# Patient Record
Sex: FEMALE | Race: BLACK OR AFRICAN AMERICAN | NOT HISPANIC OR LATINO | Employment: OTHER | URBAN - METROPOLITAN AREA
[De-identification: names, ages, dates, MRNs, and addresses within clinical notes are randomized per-mention and may not be internally consistent; named-entity substitution may affect disease eponyms.]

---

## 2017-01-03 ENCOUNTER — HISTORICAL (OUTPATIENT)
Dept: ADMINISTRATIVE | Facility: HOSPITAL | Age: 77
End: 2017-01-03

## 2017-01-09 ENCOUNTER — HISTORICAL (OUTPATIENT)
Dept: ADMINISTRATIVE | Facility: HOSPITAL | Age: 77
End: 2017-01-09

## 2017-01-16 ENCOUNTER — HISTORICAL (OUTPATIENT)
Dept: ADMINISTRATIVE | Facility: HOSPITAL | Age: 77
End: 2017-01-16

## 2017-01-23 ENCOUNTER — HISTORICAL (OUTPATIENT)
Dept: ADMINISTRATIVE | Facility: HOSPITAL | Age: 77
End: 2017-01-23

## 2017-01-30 ENCOUNTER — HISTORICAL (OUTPATIENT)
Dept: ADMINISTRATIVE | Facility: HOSPITAL | Age: 77
End: 2017-01-30

## 2017-02-06 ENCOUNTER — HISTORICAL (OUTPATIENT)
Dept: ADMINISTRATIVE | Facility: HOSPITAL | Age: 77
End: 2017-02-06

## 2017-02-09 ENCOUNTER — HISTORICAL (OUTPATIENT)
Dept: RADIOLOGY | Facility: HOSPITAL | Age: 77
End: 2017-02-09

## 2017-02-13 ENCOUNTER — HISTORICAL (OUTPATIENT)
Dept: ADMINISTRATIVE | Facility: HOSPITAL | Age: 77
End: 2017-02-13

## 2017-02-20 ENCOUNTER — HISTORICAL (OUTPATIENT)
Dept: ADMINISTRATIVE | Facility: HOSPITAL | Age: 77
End: 2017-02-20

## 2017-03-02 ENCOUNTER — HISTORICAL (OUTPATIENT)
Dept: ADMINISTRATIVE | Facility: HOSPITAL | Age: 77
End: 2017-03-02

## 2017-03-09 ENCOUNTER — HISTORICAL (OUTPATIENT)
Dept: ADMINISTRATIVE | Facility: HOSPITAL | Age: 77
End: 2017-03-09

## 2017-03-16 ENCOUNTER — HISTORICAL (OUTPATIENT)
Dept: ADMINISTRATIVE | Facility: HOSPITAL | Age: 77
End: 2017-03-16

## 2017-03-23 ENCOUNTER — HISTORICAL (OUTPATIENT)
Dept: ADMINISTRATIVE | Facility: HOSPITAL | Age: 77
End: 2017-03-23

## 2017-03-30 ENCOUNTER — HISTORICAL (OUTPATIENT)
Dept: ADMINISTRATIVE | Facility: HOSPITAL | Age: 77
End: 2017-03-30

## 2017-04-06 ENCOUNTER — HISTORICAL (OUTPATIENT)
Dept: ADMINISTRATIVE | Facility: HOSPITAL | Age: 77
End: 2017-04-06

## 2017-04-20 ENCOUNTER — HISTORICAL (OUTPATIENT)
Dept: ADMINISTRATIVE | Facility: HOSPITAL | Age: 77
End: 2017-04-20

## 2017-05-11 ENCOUNTER — HISTORICAL (OUTPATIENT)
Dept: ADMINISTRATIVE | Facility: HOSPITAL | Age: 77
End: 2017-05-11

## 2017-06-08 ENCOUNTER — HISTORICAL (OUTPATIENT)
Dept: ADMINISTRATIVE | Facility: HOSPITAL | Age: 77
End: 2017-06-08

## 2017-06-20 ENCOUNTER — HISTORICAL (OUTPATIENT)
Dept: RADIOLOGY | Facility: HOSPITAL | Age: 77
End: 2017-06-20

## 2017-07-20 ENCOUNTER — HISTORICAL (OUTPATIENT)
Dept: ADMINISTRATIVE | Facility: HOSPITAL | Age: 77
End: 2017-07-20

## 2017-09-12 ENCOUNTER — HISTORICAL (OUTPATIENT)
Dept: ADMINISTRATIVE | Facility: HOSPITAL | Age: 77
End: 2017-09-12

## 2018-02-12 ENCOUNTER — HISTORICAL (OUTPATIENT)
Dept: RADIOLOGY | Facility: HOSPITAL | Age: 78
End: 2018-02-12

## 2018-08-20 ENCOUNTER — HISTORICAL (OUTPATIENT)
Dept: WOUND CARE | Facility: HOSPITAL | Age: 78
End: 2018-08-20

## 2018-08-22 LAB — FINAL CULTURE: NORMAL

## 2018-08-28 ENCOUNTER — HISTORICAL (OUTPATIENT)
Dept: WOUND CARE | Facility: HOSPITAL | Age: 78
End: 2018-08-28

## 2018-09-04 ENCOUNTER — HISTORICAL (OUTPATIENT)
Dept: WOUND CARE | Facility: HOSPITAL | Age: 78
End: 2018-09-04

## 2018-09-10 ENCOUNTER — HISTORICAL (OUTPATIENT)
Dept: WOUND CARE | Facility: HOSPITAL | Age: 78
End: 2018-09-10

## 2018-09-17 ENCOUNTER — HISTORICAL (OUTPATIENT)
Dept: WOUND CARE | Facility: HOSPITAL | Age: 78
End: 2018-09-17

## 2018-09-25 ENCOUNTER — HISTORICAL (OUTPATIENT)
Dept: WOUND CARE | Facility: HOSPITAL | Age: 78
End: 2018-09-25

## 2018-10-02 ENCOUNTER — HISTORICAL (OUTPATIENT)
Dept: WOUND CARE | Facility: HOSPITAL | Age: 78
End: 2018-10-02

## 2018-10-09 ENCOUNTER — HISTORICAL (OUTPATIENT)
Dept: WOUND CARE | Facility: HOSPITAL | Age: 78
End: 2018-10-09

## 2018-10-16 ENCOUNTER — HISTORICAL (OUTPATIENT)
Dept: WOUND CARE | Facility: HOSPITAL | Age: 78
End: 2018-10-16

## 2018-10-23 ENCOUNTER — HISTORICAL (OUTPATIENT)
Dept: WOUND CARE | Facility: HOSPITAL | Age: 78
End: 2018-10-23

## 2018-10-30 ENCOUNTER — HISTORICAL (OUTPATIENT)
Dept: WOUND CARE | Facility: HOSPITAL | Age: 78
End: 2018-10-30

## 2018-11-06 ENCOUNTER — HISTORICAL (OUTPATIENT)
Dept: WOUND CARE | Facility: HOSPITAL | Age: 78
End: 2018-11-06

## 2018-11-13 ENCOUNTER — HISTORICAL (OUTPATIENT)
Dept: WOUND CARE | Facility: HOSPITAL | Age: 78
End: 2018-11-13

## 2018-11-16 LAB
FINAL CULTURE: NORMAL
FINAL CULTURE: NORMAL

## 2018-11-20 ENCOUNTER — HISTORICAL (OUTPATIENT)
Dept: WOUND CARE | Facility: HOSPITAL | Age: 78
End: 2018-11-20

## 2018-11-27 ENCOUNTER — HISTORICAL (OUTPATIENT)
Dept: WOUND CARE | Facility: HOSPITAL | Age: 78
End: 2018-11-27

## 2018-12-04 ENCOUNTER — HISTORICAL (OUTPATIENT)
Dept: WOUND CARE | Facility: HOSPITAL | Age: 78
End: 2018-12-04

## 2018-12-10 ENCOUNTER — HISTORICAL (OUTPATIENT)
Dept: WOUND CARE | Facility: HOSPITAL | Age: 78
End: 2018-12-10

## 2018-12-17 ENCOUNTER — HISTORICAL (OUTPATIENT)
Dept: WOUND CARE | Facility: HOSPITAL | Age: 78
End: 2018-12-17

## 2018-12-24 ENCOUNTER — HISTORICAL (OUTPATIENT)
Dept: WOUND CARE | Facility: HOSPITAL | Age: 78
End: 2018-12-24

## 2018-12-31 ENCOUNTER — HISTORICAL (OUTPATIENT)
Dept: WOUND CARE | Facility: HOSPITAL | Age: 78
End: 2018-12-31

## 2019-01-07 ENCOUNTER — HISTORICAL (OUTPATIENT)
Dept: WOUND CARE | Facility: HOSPITAL | Age: 79
End: 2019-01-07

## 2019-01-14 ENCOUNTER — HISTORICAL (OUTPATIENT)
Dept: WOUND CARE | Facility: HOSPITAL | Age: 79
End: 2019-01-14

## 2019-01-21 ENCOUNTER — HISTORICAL (OUTPATIENT)
Dept: WOUND CARE | Facility: HOSPITAL | Age: 79
End: 2019-01-21

## 2019-01-28 ENCOUNTER — HISTORICAL (OUTPATIENT)
Dept: WOUND CARE | Facility: HOSPITAL | Age: 79
End: 2019-01-28

## 2019-02-04 ENCOUNTER — HISTORICAL (OUTPATIENT)
Dept: WOUND CARE | Facility: HOSPITAL | Age: 79
End: 2019-02-04

## 2019-02-11 ENCOUNTER — HISTORICAL (OUTPATIENT)
Dept: WOUND CARE | Facility: HOSPITAL | Age: 79
End: 2019-02-11

## 2019-02-15 ENCOUNTER — HISTORICAL (OUTPATIENT)
Dept: RADIOLOGY | Facility: HOSPITAL | Age: 79
End: 2019-02-15

## 2019-02-18 ENCOUNTER — HISTORICAL (OUTPATIENT)
Dept: WOUND CARE | Facility: HOSPITAL | Age: 79
End: 2019-02-18

## 2019-02-25 ENCOUNTER — HISTORICAL (OUTPATIENT)
Dept: WOUND CARE | Facility: HOSPITAL | Age: 79
End: 2019-02-25

## 2019-03-04 ENCOUNTER — HISTORICAL (OUTPATIENT)
Dept: WOUND CARE | Facility: HOSPITAL | Age: 79
End: 2019-03-04

## 2019-03-11 ENCOUNTER — HISTORICAL (OUTPATIENT)
Dept: WOUND CARE | Facility: HOSPITAL | Age: 79
End: 2019-03-11

## 2019-03-18 ENCOUNTER — HISTORICAL (OUTPATIENT)
Dept: WOUND CARE | Facility: HOSPITAL | Age: 79
End: 2019-03-18

## 2019-03-25 ENCOUNTER — HISTORICAL (OUTPATIENT)
Dept: WOUND CARE | Facility: HOSPITAL | Age: 79
End: 2019-03-25

## 2019-04-01 ENCOUNTER — HISTORICAL (OUTPATIENT)
Dept: WOUND CARE | Facility: HOSPITAL | Age: 79
End: 2019-04-01

## 2019-04-08 ENCOUNTER — HISTORICAL (OUTPATIENT)
Dept: WOUND CARE | Facility: HOSPITAL | Age: 79
End: 2019-04-08

## 2019-04-15 ENCOUNTER — HISTORICAL (OUTPATIENT)
Dept: WOUND CARE | Facility: HOSPITAL | Age: 79
End: 2019-04-15

## 2019-04-22 ENCOUNTER — HISTORICAL (OUTPATIENT)
Dept: WOUND CARE | Facility: HOSPITAL | Age: 79
End: 2019-04-22

## 2019-04-29 ENCOUNTER — HISTORICAL (OUTPATIENT)
Dept: WOUND CARE | Facility: HOSPITAL | Age: 79
End: 2019-04-29

## 2019-05-06 ENCOUNTER — HISTORICAL (OUTPATIENT)
Dept: WOUND CARE | Facility: HOSPITAL | Age: 79
End: 2019-05-06

## 2019-05-13 ENCOUNTER — HISTORICAL (OUTPATIENT)
Dept: WOUND CARE | Facility: HOSPITAL | Age: 79
End: 2019-05-13

## 2019-05-20 ENCOUNTER — HISTORICAL (OUTPATIENT)
Dept: WOUND CARE | Facility: HOSPITAL | Age: 79
End: 2019-05-20

## 2019-05-28 ENCOUNTER — HISTORICAL (OUTPATIENT)
Dept: WOUND CARE | Facility: HOSPITAL | Age: 79
End: 2019-05-28

## 2019-06-03 ENCOUNTER — HISTORICAL (OUTPATIENT)
Dept: WOUND CARE | Facility: HOSPITAL | Age: 79
End: 2019-06-03

## 2019-06-10 ENCOUNTER — HISTORICAL (OUTPATIENT)
Dept: WOUND CARE | Facility: HOSPITAL | Age: 79
End: 2019-06-10

## 2019-06-17 ENCOUNTER — HISTORICAL (OUTPATIENT)
Dept: WOUND CARE | Facility: HOSPITAL | Age: 79
End: 2019-06-17

## 2019-06-26 ENCOUNTER — HISTORICAL (OUTPATIENT)
Dept: WOUND CARE | Facility: HOSPITAL | Age: 79
End: 2019-06-26

## 2019-06-27 LAB — GRAM STN SPEC: NORMAL

## 2019-06-29 LAB — FINAL CULTURE: NORMAL

## 2019-07-01 ENCOUNTER — HISTORICAL (OUTPATIENT)
Dept: WOUND CARE | Facility: HOSPITAL | Age: 79
End: 2019-07-01

## 2019-07-08 ENCOUNTER — HISTORICAL (OUTPATIENT)
Dept: WOUND CARE | Facility: HOSPITAL | Age: 79
End: 2019-07-08

## 2019-07-15 ENCOUNTER — HISTORICAL (OUTPATIENT)
Dept: WOUND CARE | Facility: HOSPITAL | Age: 79
End: 2019-07-15

## 2019-07-22 ENCOUNTER — HISTORICAL (OUTPATIENT)
Dept: WOUND CARE | Facility: HOSPITAL | Age: 79
End: 2019-07-22

## 2019-07-31 ENCOUNTER — HISTORICAL (OUTPATIENT)
Dept: WOUND CARE | Facility: HOSPITAL | Age: 79
End: 2019-07-31

## 2019-12-19 LAB — GRAM STN SPEC: NORMAL

## 2019-12-22 LAB — FINAL CULTURE: NORMAL

## 2020-07-01 LAB — GRAM STN SPEC: NORMAL

## 2020-07-16 LAB — GRAM STN SPEC: NORMAL

## 2021-04-08 LAB — GRAM STN SPEC: NORMAL

## 2021-04-10 LAB — FINAL CULTURE: NORMAL

## 2021-07-16 LAB — GRAM STN SPEC: NORMAL

## 2021-07-18 LAB — FINAL CULTURE: NORMAL

## 2021-09-22 ENCOUNTER — HISTORICAL (OUTPATIENT)
Dept: WOUND CARE | Facility: HOSPITAL | Age: 81
End: 2021-09-22

## 2022-04-11 ENCOUNTER — HISTORICAL (OUTPATIENT)
Dept: ADMINISTRATIVE | Facility: HOSPITAL | Age: 82
End: 2022-04-11

## 2022-04-25 VITALS
OXYGEN SATURATION: 97 % | BODY MASS INDEX: 38.49 KG/M2 | WEIGHT: 274.94 LBS | SYSTOLIC BLOOD PRESSURE: 229 MMHG | DIASTOLIC BLOOD PRESSURE: 80 MMHG | HEIGHT: 71 IN

## 2022-05-11 ENCOUNTER — OFFICE VISIT (OUTPATIENT)
Dept: WOUND CARE | Facility: HOSPITAL | Age: 82
End: 2022-05-11
Attending: NURSE PRACTITIONER
Payer: MEDICARE

## 2022-05-11 VITALS
RESPIRATION RATE: 24 BRPM | BODY MASS INDEX: 39.68 KG/M2 | HEART RATE: 63 BPM | SYSTOLIC BLOOD PRESSURE: 129 MMHG | TEMPERATURE: 98 F | DIASTOLIC BLOOD PRESSURE: 60 MMHG | WEIGHT: 293 LBS | HEIGHT: 72 IN

## 2022-05-11 DIAGNOSIS — I87.312 IDIOPATHIC CHRONIC VENOUS HYPERTENSION OF LEFT LOWER EXTREMITY WITH ULCER: ICD-10-CM

## 2022-05-11 DIAGNOSIS — L97.922 NON-PRESSURE CHRONIC ULCER OF LEFT LOWER LEG WITH FAT LAYER EXPOSED: Primary | ICD-10-CM

## 2022-05-11 DIAGNOSIS — L97.929 IDIOPATHIC CHRONIC VENOUS HYPERTENSION OF LEFT LOWER EXTREMITY WITH ULCER: ICD-10-CM

## 2022-05-11 DIAGNOSIS — L97.912 NON-PRESSURE CHRONIC ULCER OF RIGHT LOWER LEG WITH FAT LAYER EXPOSED: ICD-10-CM

## 2022-05-11 DIAGNOSIS — L97.919 IDIOPATHIC CHRONIC VENOUS HYPERTENSION OF RIGHT LOWER EXTREMITY WITH ULCER: ICD-10-CM

## 2022-05-11 DIAGNOSIS — I87.311 IDIOPATHIC CHRONIC VENOUS HYPERTENSION OF RIGHT LOWER EXTREMITY WITH ULCER: ICD-10-CM

## 2022-05-11 PROBLEM — H26.9 CATARACT OF BOTH EYES: Status: ACTIVE | Noted: 2022-05-11

## 2022-05-11 PROBLEM — F32.A ACUTE DEPRESSION: Status: ACTIVE | Noted: 2022-05-11

## 2022-05-11 PROBLEM — J18.9 PNEUMONIA: Status: ACTIVE | Noted: 2022-05-11

## 2022-05-11 PROBLEM — M19.90 OSTEOARTHRITIS: Status: ACTIVE | Noted: 2022-05-11

## 2022-05-11 PROBLEM — L98.499 SKIN ULCER: Status: ACTIVE | Noted: 2022-05-11

## 2022-05-11 PROBLEM — R06.09 DYSPNEA ON EXERTION: Status: ACTIVE | Noted: 2022-05-11

## 2022-05-11 PROBLEM — L97.909 ULCER OF LOWER EXTREMITY: Status: ACTIVE | Noted: 2022-05-11

## 2022-05-11 PROBLEM — E66.9 OBESITY: Status: ACTIVE | Noted: 2022-05-11

## 2022-05-11 PROBLEM — K29.60 BILE-INDUCED GASTRITIS: Status: ACTIVE | Noted: 2022-05-11

## 2022-05-11 PROBLEM — I27.0: Status: ACTIVE | Noted: 2022-05-11

## 2022-05-11 PROBLEM — I87.309 CHRONIC PERIPHERAL VENOUS HYPERTENSION: Status: ACTIVE | Noted: 2022-05-11

## 2022-05-11 PROBLEM — R60.9 PITTING EDEMA: Status: ACTIVE | Noted: 2022-05-11

## 2022-05-11 PROBLEM — R60.0 EDEMA OF LOWER EXTREMITY: Status: ACTIVE | Noted: 2022-05-11

## 2022-05-11 PROBLEM — I83.009 VENOUS STASIS ULCER OF LOWER EXTREMITY: Status: ACTIVE | Noted: 2022-05-11

## 2022-05-11 PROBLEM — I10 HYPERTENSION: Status: ACTIVE | Noted: 2022-05-11

## 2022-05-11 PROBLEM — B95.8 STAPHYLOCOCCUS INFECTION: Status: ACTIVE | Noted: 2022-05-11

## 2022-05-11 PROBLEM — I87.9 DISORDER OF VEIN: Status: ACTIVE | Noted: 2022-05-11

## 2022-05-11 PROBLEM — Z97.3 WEARS EYEGLASSES: Status: ACTIVE | Noted: 2022-05-11

## 2022-05-11 PROBLEM — K44.9 HIATAL HERNIA: Status: ACTIVE | Noted: 2022-05-11

## 2022-05-11 PROBLEM — K21.9 GASTROESOPHAGEAL REFLUX DISEASE: Status: ACTIVE | Noted: 2022-05-11

## 2022-05-11 PROBLEM — L97.909 VENOUS STASIS ULCER OF LOWER EXTREMITY: Status: ACTIVE | Noted: 2022-05-11

## 2022-05-11 PROCEDURE — 97597 DBRDMT OPN WND 1ST 20 CM/<: CPT

## 2022-05-11 PROCEDURE — 29580 STRAPPING UNNA BOOT: CPT

## 2022-05-11 RX ORDER — CARVEDILOL 12.5 MG/1
12.5 TABLET ORAL 2 TIMES DAILY
COMMUNITY
Start: 2022-04-03

## 2022-05-11 RX ORDER — SPIRONOLACTONE AND HYDROCHLOROTHIAZIDE 25; 25 MG/1; MG/1
TABLET ORAL
COMMUNITY
Start: 2022-05-10 | End: 2022-09-14 | Stop reason: ALTCHOICE

## 2022-05-11 RX ORDER — FLUOXETINE HYDROCHLORIDE 20 MG/1
20 CAPSULE ORAL DAILY
COMMUNITY
Start: 2022-04-02

## 2022-05-11 RX ORDER — AMLODIPINE BESYLATE 2.5 MG/1
2.5 TABLET ORAL DAILY
COMMUNITY
Start: 2022-01-31 | End: 2022-09-14 | Stop reason: ALTCHOICE

## 2022-05-11 RX ORDER — FUROSEMIDE 40 MG/1
TABLET ORAL
COMMUNITY
Start: 2022-04-23 | End: 2022-09-14 | Stop reason: ALTCHOICE

## 2022-05-11 RX ORDER — CLOPIDOGREL BISULFATE 75 MG/1
75 TABLET ORAL DAILY
COMMUNITY
Start: 2022-04-26

## 2022-05-11 RX ORDER — VALSARTAN 160 MG/1
160 TABLET ORAL 2 TIMES DAILY
COMMUNITY
Start: 2022-04-23

## 2022-05-11 RX ORDER — PANTOPRAZOLE SODIUM 40 MG/1
40 TABLET, DELAYED RELEASE ORAL DAILY
COMMUNITY
Start: 2022-04-26

## 2022-05-11 RX ORDER — COLLAGENASE SANTYL 250 [ARB'U]/G
1 OINTMENT TOPICAL DAILY
COMMUNITY
Start: 2021-12-10

## 2022-05-11 NOTE — PROGRESS NOTES
Had xray of knees.  Dr. Sanders planning on doing injections for pain relief on 5/24/22    Left lateral leg-non excisional  [place]  lidocaine 5%        Home health to change on  Friday, Monday, Wednesday, Friday, and Monday  Left & right leg wounds-Cleanse with wound cleanser, apply Zinc paste periwound, apply calcium alginate to wound bed, apply Zinc unna boot, wrap with kerlix and coban.     Return to clinic in 2 weeks.   Spine appears normal, range of motion is not limited, no muscle or joint tenderness + LE swelling R>L

## 2022-05-11 NOTE — PROGRESS NOTES
[facility] WOUND CARE  WOUND CARE PROGRESS NOTE      Patient name: Jil Graves MRN: [id number]   : 1940 PCP: Antwon Melendez MD     SUBJECTIVE    Chief Complaint:   Chief Complaint   Patient presents with    Venous Ulcer     Bilateral venous stasis wounds       HPI:  Ms. Graves is being seen for bilateral lower extremity venous stasis ulcers.    She reports that she has seen her orthopedic surgeon who did x-rays of her bilateral knees.  Doctor Tommy will be doing injections for pain relief on May 24, 2022. He is also considering braces for her knees.    Today her wounds are stable.  The right lower leg wound is measuring slightly smaller.  The left lower leg wound has an increase in granular tissue as well as an increase in blood perfusion.      Review of Systems:  Constitutional: Negative.    Cardiovascular: Negative.    Skin:        As HPI.   All other systems reviewed and are negative.        Physical Exam  Vitals and nursing note reviewed.   Constitutional:       Appearance: morbidly obese  Cardiovascular:      2+ edema to the lower extremities  Pulmonary:      SOB with exertion   Skin:     General: Skin is warm and dry.      Comments: venous ulcer to the left and right lower extremities    Neurological:      Mental Status: female is alert.       OBJECTIVE    Medical History:  Patient Active Problem List   Diagnosis    Acute depression    Bile-induced gastritis    Cataract of both eyes    Idiopathic chronic venous hypertension of left lower extremity with ulcer    Disorder of vein    Dyspnea on exertion    Edema of lower extremity    Gastroesophageal reflux disease    Hiatal hernia    Hypertension    Obesity    Osteoarthritis    Pitting edema    Pneumonia    Skin ulcer    Staphylococcus infection    Non-pressure chronic ulcer of right lower leg with fat layer exposed    Venous stasis ulcer of lower extremity    Wears eyeglasses    Pulmonary veno-occlusive disease        Wound  05/11/22 1157 Venous Ulcer lower;lateral Leg #1 (Active)   Wound Length (cm) 8.2 cm 05/11/22 1156   Wound Width (cm) 7.5 cm 05/11/22 1156   Wound Depth (cm) 0.6 cm 05/11/22 1156   Wound Volume (cm^3) 36.9 cm^3 05/11/22 1156            Wound 05/11/22 1157 Venous Ulcer Right lower;medial #2 (Active)   Wound Length (cm) 1.4 cm 05/11/22 1156   Wound Width (cm) 1.6 cm 05/11/22 1156   Wound Depth (cm) 0.2 cm 05/11/22 1156   Wound Volume (cm^3) 0.448 cm^3 05/11/22 1156         PROCEDURES    5/11/2022  1:57 PM    Indication for procedure: Removal of specific targeted areas of devitalized or necrotic tissue or debris from wound bed.  Wound location:   #1 - left lateral lower leg  #2 - right medial lower leg  Equipment utilized: [place]  Wound Condition:   #1 - improved  #2 - improved  Procedure: Selective debridement, both wounds  Anesthesia: 5% topical lidocaine  Estimated blood loss: N/A  Complications: none  Patient tolerated procedure: well  Wound dressing applied:   Bilateral lower extremity wounds:  Zinc unna boots, kerlix, coban  Description of the procedure:  Informed consent was obtained pre-procedurally, with the risks, benefits and alteratives to the planned procedure explained at length to the patient, who expressed understanding and wished to proceed. A time out was observed and on the day of procedure the patient was correctly identified.    The wound was prepared initially by irrigating with wound cleanser.     A selective debridement of wound #1 was then carried out removing the following tissue types:  Slough, biofilm, exudate   A selective debridement of wound # was then carried out removing the following tissue types: biofilm, exudate      Total surface area debrided was <20 cm^2, both wounds    Percent debrided 100 %.       IMPRESSION     1. 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 Idiopathic chronic venous hypertension of right lower extremity with ulcer    3. Non-pressure  chronic ulcer of right lower leg with fat layer exposed    4. Idiopathic chronic venous hypertension of left lower extremity with ulcer           PLAN  Follow up in about 2 weeks (around 5/25/2022).     #2 - Wound dressing:  Left & right leg wounds-Cleanse with wound cleanser, apply Zinc paste periwound, apply calcium alginate to wound bed, apply Zinc unna boot, wrap with kerlix and coban.  Home health to change on  Friday, Monday, Wednesday, Friday, and Monday  [facility] Fax [phone]    MEDICATIONS    Current Outpatient Medications:     amLODIPine (NORVASC) 2.5 MG tablet, Take 2.5 mg by mouth once daily., Disp: , Rfl:     carvediloL (COREG) 12.5 MG tablet, Take 12.5 mg by mouth 2 (two) times daily., Disp: , Rfl:     clopidogreL (PLAVIX) 75 mg tablet, Take 75 mg by mouth once daily., Disp: , Rfl:     FLUoxetine 20 MG capsule, Take 20 mg by mouth once daily., Disp: , Rfl:     furosemide (LASIX) 40 MG tablet, TAKE 1 TABLET BY MOUTH TWICE DAILY IN THE MORNING AND AT LUNCH, Disp: , Rfl:     pantoprazole (PROTONIX) 40 MG tablet, Take 40 mg by mouth once daily at 6am., Disp: , Rfl:     SANTYL ointment, Apply 1 application topically once daily at 6am., Disp: , Rfl:     spironolactone-hydrochlorothiazide 25-25mg (ALDACTAZIDE) 25-25 mg Tab, , Disp: , Rfl:     valsartan (DIOVAN) 160 MG tablet, Take 160 mg by mouth 2 (two) times daily., Disp: , Rfl:  Review of patient's allergies indicates:  No Known Allergies    VITAL SIGNS  Vitals:    05/11/22 1126   BP: 129/60   Pulse: 63   Resp: (!) 24   Temp: 98.1 °F (36.7 °C)         Electronically signed:  Bonnie Portillo NP, MSN

## 2022-05-25 ENCOUNTER — HOSPITAL ENCOUNTER (OUTPATIENT)
Dept: WOUND CARE | Facility: HOSPITAL | Age: 82
Discharge: HOME | End: 2022-05-25
Attending: NURSE PRACTITIONER
Payer: MEDICARE

## 2022-05-25 VITALS
SYSTOLIC BLOOD PRESSURE: 122 MMHG | HEIGHT: 72 IN | HEART RATE: 60 BPM | WEIGHT: 293 LBS | RESPIRATION RATE: 20 BRPM | DIASTOLIC BLOOD PRESSURE: 58 MMHG | BODY MASS INDEX: 39.68 KG/M2

## 2022-05-25 DIAGNOSIS — L97.912 NON-PRESSURE CHRONIC ULCER OF RIGHT LOWER LEG WITH FAT LAYER EXPOSED: ICD-10-CM

## 2022-05-25 DIAGNOSIS — L97.922 NON-PRESSURE CHRONIC ULCER OF LEFT LOWER LEG WITH FAT LAYER EXPOSED: Primary | ICD-10-CM

## 2022-05-25 PROCEDURE — 99214 OFFICE O/P EST MOD 30 MIN: CPT | Mod: 25

## 2022-05-25 PROCEDURE — 29580 STRAPPING UNNA BOOT: CPT

## 2022-05-25 PROCEDURE — 97598 DBRDMT OPN WND ADDL 20CM/<: CPT

## 2022-05-25 PROCEDURE — 97597 DBRDMT OPN WND 1ST 20 CM/<: CPT

## 2022-05-25 NOTE — PROGRESS NOTES
Subjective:       Patient ID: Jil Graves is a 82 y.o. female.    Chief Complaint: Bilateral venous ulcers    HPI     Star is being seen for bilateral lower extremity venous ulcers.  She reports that she saw her orthopedic surgeon recently and had bilateral knee injections with removal of fluid.  She reports that she is much more comfortable although she does still have edema in both lower extremities.  The venous ulcer on the right lower extremity looks particularly well and has continued to decrease in size.  The venous ulcer on the left lower extremity is doing very well with the wound bed that is quickly developing granulation tissue.    Both wound beds are clean with decreasing drainage.  Ms. Graves is out of lidocaine.  We will see if she is able to tolerate debridement without that product going forward.     Review of Systems   Constitutional: Negative.    HENT: Negative.    Respiratory: Negative.    Musculoskeletal: Positive for gait problem.   Skin: Positive for wound.   Psychiatric/Behavioral: Negative.          Objective:        Physical Exam  Vitals reviewed.   Constitutional:       Appearance: She is obese.   Cardiovascular:      Pulses: Normal pulses.   Pulmonary:      Effort: Pulmonary effort is normal.      Breath sounds: Normal breath sounds.   Musculoskeletal:      Right lower leg: Edema present.      Left lower leg: Edema present.   Skin:     General: Skin is warm and dry.   Neurological:      General: No focal deficit present.      Mental Status: She is alert and oriented to person, place, and time.   Psychiatric:         Mood and Affect: Mood normal.         Behavior: Behavior normal.         Thought Content: Thought content normal.         Judgment: Judgment normal.            Wound 05/11/22 1157 Venous Ulcer Left lower;lateral Leg #1 (Active)   05/11/22 1157    Pre-existing:    Primary Wound Type: Venous ulcer   Side: Left   Orientation: lower;lateral   Location: Leg   Wound Number: #1    Ankle-Brachial Index:    Pulses:    Removal Indication and Assessment:    Wound Outcome:    (Retired) Wound Type:    (Retired) Wound Length (cm):    (Retired) Wound Width (cm):    (Retired) Depth (cm):    Wound Description (Comments):    Removal Indications:    Dressing Appearance Moist drainage 05/25/22 1102   Drainage Amount Large 05/25/22 1102   Drainage Characteristics/Odor Serosanguineous;Green 05/25/22 1102   Appearance Pink;Slough;Moist 05/25/22 1102   Tissue loss description Full thickness 05/25/22 1102   Periwound Area Intact;Moist 05/25/22 1102   Wound Edges Open 05/25/22 1102   Wound Length (cm) 7.6 cm 05/25/22 1102   Wound Width (cm) 7.7 cm 05/25/22 1102   Wound Depth (cm) 0.7 cm 05/25/22 1102   Wound Volume (cm^3) 40.964 cm^3 05/25/22 1102   Wound Surface Area (cm^2) 58.52 cm^2 05/25/22 1102   Care Cleansed with:;Wound cleanser 05/25/22 1102   Dressing Applied 05/25/22 1102   Compression Unna's Boot;Two layer compression 05/25/22 1102   Dressing Change Due 05/27/22 05/25/22 1102            Wound 05/11/22 1157 Venous Ulcer Right lower;medial #2 (Active)   05/11/22 1157    Pre-existing:    Primary Wound Type: Venous ulcer   Side: Right   Orientation: lower;medial   Location:    Wound Number: #2   Ankle-Brachial Index:    Pulses:    Removal Indication and Assessment:    Wound Outcome:    (Retired) Wound Type:    (Retired) Wound Length (cm):    (Retired) Wound Width (cm):    (Retired) Depth (cm):    Wound Description (Comments):    Removal Indications:    Dressing Appearance Moist drainage 05/25/22 1102   Drainage Amount Moderate 05/25/22 1102   Drainage Characteristics/Odor Serosanguineous 05/25/22 1102   Appearance Pink;Moist 05/25/22 1102   Tissue loss description Full thickness 05/25/22 1102   Periwound Area Moist;Intact 05/25/22 1102   Wound Edges Open 05/25/22 1102   Wound Length (cm) 1 cm 05/25/22 1102   Wound Width (cm) 1.1 cm 05/25/22 1102   Wound Depth (cm) 0.2 cm 05/25/22 1102   Wound Volume  "(cm^3) 0.22 cm^3 05/25/22 1102   Wound Surface Area (cm^2) 1.1 cm^2 05/25/22 1102   Care Cleansed with:;Wound cleanser 05/25/22 1102   Dressing Applied 05/25/22 1102   Compression Unna's Boot;Two layer compression 05/25/22 1102   Dressing Change Due 05/27/22 05/25/22 1102         Assessment:         ICD-10-CM ICD-9-CM   1. Non-pressure chronic ulcer of left lower leg with fat layer exposed  L97.922 707.10   2. Non-pressure chronic ulcer of right lower leg with fat layer exposed  L97.912 707.10         Procedures:   Excisional debridement performed:  [] Yes [x] No   Selective debridement performed:  [x] Yes [] No   Mechanical debridement performed:  [] Yes [x] No   Silver nitrate applied:    [] Yes [x] No   Labs ordered this visit:   [] Yes [x] No   Imaging ordered this visit:   [] Yes [x] No   Tissue pathology and/or culture taken:  [] Yes [x] No     Procedures:    Debridement     Date/Time: 5/25/2022 10:00 AM  Performed by: Bonnie Portillo NP  Authorized by: Bonnie Portillo NP     Time out: Immediately prior to procedure a "time out" was called to verify the correct patient, procedure, equipment, support staff and site/side marked as required.   Consent Done?:  Yes (Verbal)  Local anesthesia used?: Yes    Local anesthetic:  Lidocaine 2% without epinephrine     Wound Details:    Location:  Left leg    Type of Debridement:  Non-excisional       Length (cm):  7.6       Area (sq cm):  58.52       Width (cm):  7.7       Percent Debrided (%):  80       Depth (cm):  0.7       Total Area Debrided (sq cm):  46.82    Depth of debridement:  Epidermis/Dermis    Devitalized tissue debrided:  Biofilm, Exudate, Fibrin and Slough    Instruments:  Other ([place])     2nd Wound Details:     Location:  Right leg    Type of Debridement:  Non-excisional       Length (cm):  1       Area (sq cm):  1.1       Width (cm):  1.1       Percent Debrided (%):  100       Depth (cm):  0.2       Total Area Debrided (sq cm):  1.1    Depth of " debridement:  Epidermis/Dermis    Devitalized tissue debrided:  Biofilm, Exudate and Fibrin    Instruments:  Other ([place])      ML     [facility]: [facility]  TIMES PER WEEK/DAYS:  2 x week;  Monday, Friday  ORDERS:  Left & right leg wounds-Cleanse with wound cleanser, apply Zinc paste periwound, apply calcium alginate to wound bed, apply Zinc unna boot, wrap with kerlix and coban.  Home health to change on Monday, Wednesday and Friday until patient returns to wound care in two weeks.        Follow up in about 2 weeks (around 6/8/2022) for Bilateral lower extremity wounds - venous ulcer.

## 2022-06-08 ENCOUNTER — HOSPITAL ENCOUNTER (OUTPATIENT)
Dept: WOUND CARE | Facility: HOSPITAL | Age: 82
Discharge: HOME | End: 2022-06-08
Attending: NURSE PRACTITIONER
Payer: MEDICARE

## 2022-06-08 VITALS
RESPIRATION RATE: 20 BRPM | HEART RATE: 65 BPM | HEIGHT: 72 IN | SYSTOLIC BLOOD PRESSURE: 126 MMHG | BODY MASS INDEX: 39.68 KG/M2 | WEIGHT: 293 LBS | TEMPERATURE: 98 F | DIASTOLIC BLOOD PRESSURE: 58 MMHG

## 2022-06-08 DIAGNOSIS — L97.922 NON-PRESSURE CHRONIC ULCER OF LEFT LOWER LEG WITH FAT LAYER EXPOSED: Primary | ICD-10-CM

## 2022-06-08 DIAGNOSIS — L60.2 ONYCHAUXIS: ICD-10-CM

## 2022-06-08 DIAGNOSIS — I87.311 IDIOPATHIC CHRONIC VENOUS HYPERTENSION OF RIGHT LOWER EXTREMITY WITH ULCER: ICD-10-CM

## 2022-06-08 DIAGNOSIS — I87.312 IDIOPATHIC CHRONIC VENOUS HYPERTENSION OF LEFT LOWER EXTREMITY WITH ULCER: ICD-10-CM

## 2022-06-08 DIAGNOSIS — L97.919 IDIOPATHIC CHRONIC VENOUS HYPERTENSION OF RIGHT LOWER EXTREMITY WITH ULCER: ICD-10-CM

## 2022-06-08 DIAGNOSIS — L97.912 NON-PRESSURE CHRONIC ULCER OF RIGHT LOWER LEG WITH FAT LAYER EXPOSED: ICD-10-CM

## 2022-06-08 DIAGNOSIS — L97.929 IDIOPATHIC CHRONIC VENOUS HYPERTENSION OF LEFT LOWER EXTREMITY WITH ULCER: ICD-10-CM

## 2022-06-08 PROCEDURE — 97597 DBRDMT OPN WND 1ST 20 CM/<: CPT

## 2022-06-08 PROCEDURE — 99213 OFFICE O/P EST LOW 20 MIN: CPT

## 2022-06-08 PROCEDURE — 29580 STRAPPING UNNA BOOT: CPT

## 2022-06-08 PROCEDURE — 11055 PARING/CUTG B9 HYPRKER LES 1: CPT

## 2022-06-08 PROCEDURE — 11721 DEBRIDE NAIL 6 OR MORE: CPT

## 2022-06-08 PROCEDURE — 97598 DBRDMT OPN WND ADDL 20CM/<: CPT

## 2022-06-08 NOTE — PROGRESS NOTES
Subjective:       Patient ID: Jil Graves is a 82 y.o. female.    Chief Complaint: Venous Stasis (Bilateral lower legs)    HPI    Ms. Graves is returning for venous ulcers to her bilateral lower extremities.  These wounds have continued to make excellent progress over the last few weeks since we have been able to apply unna boots.  Although she could benefit from the use of Santyl, the progress with unna boots is, at this point, more important.  Both wounds have continued to decrease in size, and the wound on the right lower extremity is nearly closed.  Selective debridements were performed on both.    Of note, there was an area of concern on the distal end of the left 2nd toe.  A callus pairing was performed using a dermal curette, exposing epithelial tissue and a 0.1 x 0.1 x 0.1 cm wound.  We will not consider this area to be an open wound at this time.  The patient and I discussed the need to offload pressure from the toe to allow the area an opportunity to heal.      Because the patient is diabetic, her toe nails were trimmed x 10    Review of Systems   Musculoskeletal: Positive for gait problem.   Skin: Positive for wound.   All other systems reviewed and are negative.        Objective:      Vitals:    06/08/22 1125   BP: (!) 126/58   Pulse: 65   Resp: 20   Temp: 98.1 °F (36.7 °C)       Physical Exam  Vitals reviewed. Exam conducted with a chaperone present (Daughter).   Constitutional:       Appearance: She is obese.   HENT:      Head: Normocephalic.   Cardiovascular:      Rate and Rhythm: Normal rate.      Pulses: Normal pulses.   Pulmonary:      Effort: Pulmonary effort is normal.      Comments: SOB with exertion  Musculoskeletal:      Right lower leg: Edema present.      Left lower leg: Edema present.      Comments: Edema is improving.  Ambulation difficult due to knee pain.    Skin:     General: Skin is warm and dry.      Findings: Wound present.      Comments: Bilateral lower extremity  wounds.  Callus to left 2nd toe.   Neurological:      General: No focal deficit present.      Mental Status: She is alert and oriented to person, place, and time.   Psychiatric:         Mood and Affect: Mood normal.            Wound 05/11/22 1157 Venous Ulcer Left lower;lateral Leg #1 (Active)   05/11/22 1157    Pre-existing:    Primary Wound Type: Venous ulcer   Side: Left   Orientation: lower;lateral   Location: Leg   Wound Number: #1   Ankle-Brachial Index:    Pulses:    Removal Indication and Assessment:    Wound Outcome:    (Retired) Wound Type:    (Retired) Wound Length (cm):    (Retired) Wound Width (cm):    (Retired) Depth (cm):    Wound Description (Comments):    Removal Indications:    Dressing Appearance Intact 06/08/22 1128   Drainage Amount Moderate 06/08/22 1128   Drainage Characteristics/Odor Serous;[place] 06/08/22 1128   Appearance Slough;Granulating 06/08/22 1128   Tissue loss description Full thickness 06/08/22 1128   Periwound Area Edematous;Hemosiderin Staining 06/08/22 1128   Wound Edges Irregular 06/08/22 1128   Wound Length (cm) 8 cm 06/08/22 1128   Wound Width (cm) 7.5 cm 06/08/22 1128   Wound Depth (cm) 0.4 cm 06/08/22 1128   Wound Volume (cm^3) 24 cm^3 06/08/22 1128   Wound Surface Area (cm^2) 60 cm^2 06/08/22 1128   Care Wound cleanser 06/08/22 1128   Dressing Applied 06/08/22 1128   Compression Unna's Boot 06/08/22 1128            Wound 05/11/22 1157 Venous Ulcer Right lower;medial #2 (Active)   05/11/22 1157    Pre-existing:    Primary Wound Type: Venous ulcer   Side: Right   Orientation: lower;medial   Location:    Wound Number: #2   Ankle-Brachial Index:    Pulses:    Removal Indication and Assessment:    Wound Outcome:    (Retired) Wound Type:    (Retired) Wound Length (cm):    (Retired) Wound Width (cm):    (Retired) Depth (cm):    Wound Description (Comments):    Removal Indications:    Dressing Appearance Intact 06/08/22 1128   Drainage Amount Moderate 06/08/22 1128   Drainage  "Characteristics/Odor Clear;Serous 06/08/22 1128   Appearance Granulating;Slough 06/08/22 1128   Tissue loss description Full thickness 06/08/22 1128   Periwound Area Hemosiderin Staining;Edematous 06/08/22 1128   Wound Edges Irregular 06/08/22 1128   Wound Length (cm) 0.7 cm 06/08/22 1128   Wound Width (cm) 0.8 cm 06/08/22 1128   Wound Depth (cm) 0.1 cm 06/08/22 1128   Wound Volume (cm^3) 0.056 cm^3 06/08/22 1128   Wound Surface Area (cm^2) 0.56 cm^2 06/08/22 1128   Care Wound cleanser 06/08/22 1128   Dressing Applied 06/08/22 1128   Compression Unna's Boot 06/08/22 1128       Left lateral leg       Right medial leg       Left 2nd toe          Assessment:         ICD-10-CM ICD-9-CM   1. Non-pressure chronic ulcer of left lower leg with fat layer exposed  L97.922 707.10   2. Non-pressure chronic ulcer of right lower leg with fat layer exposed  L97.912 707.10   3. Idiopathic chronic venous hypertension of right lower extremity with ulcer  I87.311 459.31    L97.919    4. Idiopathic chronic venous hypertension of left lower extremity with ulcer  I87.312 459.31    L97.929          Procedures:   Excisional debridement performed:  [] Yes [x] No   Selective debridement performed:  [x] Yes [] No   Mechanical debridement performed:  [] Yes [x] No   Silver nitrate applied:    [] Yes [x] No   Labs ordered this visit:   [] Yes [x] No   Imaging ordered this visit:   [] Yes [x] No   Tissue pathology and/or culture taken:  [] Yes [x] No   Nail debridement x 10     Procedures:  Debridement     Date/Time: 6/8/2022 10:00 AM  Performed by: Bonnie Portillo NP  Authorized by: Bonnie Portillo NP     Time out: Immediately prior to procedure a "time out" was called to verify the correct patient, procedure, equipment, support staff and site/side marked as required.   Consent Done?:  Yes (Verbal)  Local anesthesia used?: Yes    Local anesthetic:  Lidocaine 2% topical gel     Wound Details:    Location:  Left leg    Type of Debridement:  " Non-excisional       Length (cm):  8       Area (sq cm):  60       Width (cm):  7.5       Percent Debrided (%):  80       Depth (cm):  0.4       Total Area Debrided (sq cm):  48    Depth of debridement:  Epidermis/Dermis    Devitalized tissue debrided:  Biofilm, Clots, Exudate, Fibrin, Other and Slough    Instruments:  Forceps and Other ([place])     2nd Wound Details:     Location:  Right leg    Type of Debridement:  Non-excisional       Length (cm):  0.7       Area (sq cm):  0.56       Width (cm):  0.8       Percent Debrided (%):  100       Depth (cm):  0.1       Total Area Debrided (sq cm):  0.56    Depth of debridement:  Epidermis/Dermis    Devitalized tissue debrided:  Biofilm, Exudate and Fibrin    Instruments:  Other ([place])      LR    [facility]  [facility]  TIMES PER WEEK/DAYS:  2 x week, Saturday, Wednesday, Saturday  ORDERS:    Left & right leg wounds-Cleanse with wound cleanser, apply Zinc paste periwound, cover with calcium alginate to wound bed, apply Zinc unna boot, wrap with kerlix and coban.   Home health to change on Saturday, Wednesday, Saturday until patient returns to wound care in two weeks.  Return to clinic in 2 weeks    Patient Orders:  Follow up in about 2 weeks (around 6/22/2022).

## 2022-06-22 ENCOUNTER — HOSPITAL ENCOUNTER (OUTPATIENT)
Dept: WOUND CARE | Facility: HOSPITAL | Age: 82
Discharge: HOME | End: 2022-06-22
Attending: NURSE PRACTITIONER
Payer: MEDICARE

## 2022-06-22 VITALS
SYSTOLIC BLOOD PRESSURE: 125 MMHG | WEIGHT: 293 LBS | RESPIRATION RATE: 19 BRPM | HEART RATE: 68 BPM | HEIGHT: 72 IN | BODY MASS INDEX: 39.68 KG/M2 | DIASTOLIC BLOOD PRESSURE: 59 MMHG

## 2022-06-22 DIAGNOSIS — L97.912 NON-PRESSURE CHRONIC ULCER OF RIGHT LOWER LEG WITH FAT LAYER EXPOSED: ICD-10-CM

## 2022-06-22 DIAGNOSIS — L97.919 IDIOPATHIC CHRONIC VENOUS HYPERTENSION OF RIGHT LOWER EXTREMITY WITH ULCER: ICD-10-CM

## 2022-06-22 DIAGNOSIS — I87.311 IDIOPATHIC CHRONIC VENOUS HYPERTENSION OF RIGHT LOWER EXTREMITY WITH ULCER: ICD-10-CM

## 2022-06-22 DIAGNOSIS — L97.922 NON-PRESSURE CHRONIC ULCER OF LEFT LOWER LEG WITH FAT LAYER EXPOSED: Primary | ICD-10-CM

## 2022-06-22 DIAGNOSIS — L97.929 IDIOPATHIC CHRONIC VENOUS HYPERTENSION OF LEFT LOWER EXTREMITY WITH ULCER: ICD-10-CM

## 2022-06-22 DIAGNOSIS — I87.312 IDIOPATHIC CHRONIC VENOUS HYPERTENSION OF LEFT LOWER EXTREMITY WITH ULCER: ICD-10-CM

## 2022-06-22 PROCEDURE — 97598 DBRDMT OPN WND ADDL 20CM/<: CPT

## 2022-06-22 PROCEDURE — 99214 OFFICE O/P EST MOD 30 MIN: CPT | Mod: 25

## 2022-06-22 PROCEDURE — 97597 DBRDMT OPN WND 1ST 20 CM/<: CPT

## 2022-06-22 NOTE — PROGRESS NOTES
Subjective:       Patient ID: Jil Graves is a 82 y.o. female.    Chief Complaint: Venous Stasis (Bilateral lower legs )    HPI  Ms. Graves returns today for follow up for bilateral lower extremity venous ulcers.    Today the wound on the left lower extremity has a thickened layer of Santyl that has developed since her last visit.  We used the ultrasonic debrider with the hidalgo tip on this wound with successful removal of approximately 40% of the slough.  We will begin using Santyl on this wound, as well as increasing home health visits weekly until the wound improves.     The right lower extremity continues to progress well.  The hypergranulated tissue was chemically cauterized with silver nitrate.    We were using Unna boots previously.  We will hold the Unna boots x 1 week to allow for daily application of Santyl until the wound improves.    Review of Systems   Musculoskeletal: Positive for gait problem.   Skin: Positive for wound.   All other systems reviewed and are negative.        Objective:      Vitals:    06/22/22 1010   BP: (!) 125/59   Pulse: 68   Resp: 19       Physical Exam  Vitals reviewed. Exam conducted with a chaperone present (Daughter).   Constitutional:       Appearance: She is obese.   HENT:      Head: Normocephalic.   Cardiovascular:      Rate and Rhythm: Normal rate.      Pulses: Normal pulses.   Pulmonary:      Effort: Pulmonary effort is normal.      Comments: SOB with exertion  Musculoskeletal:      Right lower leg: Edema present.      Left lower leg: Edema present.      Comments: Edema is improving.  Ambulation difficult due to knee pain.    Skin:     General: Skin is warm and dry.      Findings: Wound present.      Comments: Bilateral lower extremity wounds.  Callus to left 2nd toe.   Neurological:      General: No focal deficit present.      Mental Status: She is alert and oriented to person, place, and time.   Psychiatric:         Mood and Affect: Mood normal.            Wound  05/11/22 1157 Venous Ulcer Left lower;lateral Leg #1 (Active)   05/11/22 1157    Pre-existing:    Primary Wound Type: Venous ulcer   Side: Left   Orientation: lower;lateral   Location: Leg   Wound Number: #1   Ankle-Brachial Index:    Pulses:    Removal Indication and Assessment:    Wound Outcome:    (Retired) Wound Type:    (Retired) Wound Length (cm):    (Retired) Wound Width (cm):    (Retired) Depth (cm):    Wound Description (Comments):    Removal Indications:    Dressing Appearance Moist drainage 06/22/22 1012   Drainage Amount Moderate 06/22/22 1012   Drainage Characteristics/Odor Serosanguineous 06/22/22 1012   Appearance Slough;Fibrin;Epithelialization;Moist 06/22/22 1012   Tissue loss description Full thickness 06/22/22 1012   Periwound Area Intact;Moist 06/22/22 1012   Wound Edges Defined;Jagged 06/22/22 1012   Wound Length (cm) 8.1 cm 06/22/22 1012   Wound Width (cm) 8 cm 06/22/22 1012   Wound Depth (cm) 0.4 cm 06/22/22 1012   Wound Volume (cm^3) 25.92 cm^3 06/22/22 1012   Wound Surface Area (cm^2) 64.8 cm^2 06/22/22 1012   Care Cleansed with:;Wound cleanser 06/22/22 1012   Dressing Applied;Other (comment) 06/22/22 1012   Dressing Change Due 06/24/22 06/22/22 1012            Wound 05/11/22 1157 Venous Ulcer Right lower;medial #2 (Active)   05/11/22 1157    Pre-existing:    Primary Wound Type: Venous ulcer   Side: Right   Orientation: lower;medial   Location:    Wound Number: #2   Ankle-Brachial Index:    Pulses:    Removal Indication and Assessment:    Wound Outcome:    (Retired) Wound Type:    (Retired) Wound Length (cm):    (Retired) Wound Width (cm):    (Retired) Depth (cm):    Wound Description (Comments):    Removal Indications:    Dressing Appearance Moist drainage 06/22/22 1012   Drainage Amount Moderate 06/22/22 1012   Drainage Characteristics/Odor Serosanguineous 06/22/22 1012   Appearance Slough;Epithelialization 06/22/22 1012   Tissue loss description Full thickness 06/22/22 1012   Periwound  "Area Intact 06/22/22 1012   Wound Edges Defined 06/22/22 1012   Wound Length (cm) 0.3 cm 06/22/22 1012   Wound Width (cm) 0.5 cm 06/22/22 1012   Wound Depth (cm) 0.2 cm 06/22/22 1012   Wound Volume (cm^3) 0.03 cm^3 06/22/22 1012   Wound Surface Area (cm^2) 0.15 cm^2 06/22/22 1012   Care Cleansed with:;Wound cleanser 06/22/22 1012   Dressing Applied;Other (comment) 06/22/22 1012   Dressing Change Due 06/24/22 06/22/22 1012            Right lower leg, pre  post   powdered collagen, 4x4 gauze, kerlix, coban           Left lateral leg, pre  pos   santyl, adaptic touch, 4x4 gauze, kerlix, coban  CT      Assessment:         ICD-10-CM ICD-9-CM   1. Non-pressure chronic ulcer of left lower leg with fat layer exposed  L97.922 707.10   2. Non-pressure chronic ulcer of right lower leg with fat layer exposed  L97.912 707.10   3. Idiopathic chronic venous hypertension of right lower extremity with ulcer  I87.311 459.31    L97.919    4. Idiopathic chronic venous hypertension of left lower extremity with ulcer  I87.312 459.31    L97.929          Procedures:   Excisional debridement performed:  [] Yes [x] No   Selective debridement performed:  [x] Yes [] No   Mechanical debridement performed:  [] Yes [x] No   Silver nitrate applied:    [x] Yes [] No   Labs ordered this visit:   [] Yes [x] No   Imaging ordered this visit:   [] Yes [x] No   Tissue pathology and/or culture taken:  [] Yes [x] No   Ultrasonic debridement:    [x] Yes [] No     Procedures:  Debridement     Date/Time: 6/22/2022 9:20 AM  Performed by: Bonnie Portillo NP  Authorized by: Bonnie Portillo NP     Time out: Immediately prior to procedure a "time out" was called to verify the correct patient, procedure, equipment, support staff and site/side marked as required.   Consent Done?:  Yes (Verbal)  Local anesthetic:  Lidocaine 2% topical gel     Wound Details:    Location:  Left leg    Type of Debridement:  Non-excisional       Length (cm):  8.1       Area (sq cm):  " 64.8       Width (cm):  8       Percent Debrided (%):  80       Depth (cm):  0.4       Total Area Debrided (sq cm):  51.84    Depth of debridement:  Epidermis/Dermis    Instruments:  Ultrasound Probe, Forceps and Scissors      #1, ultrasonic debridement  #2, Silver nitrate applied in treatment of hypergranulated tissue  CT  MEDICATIONS    Current Outpatient Medications:     amLODIPine (NORVASC) 2.5 MG tablet, Take 2.5 mg by mouth once daily., Disp: , Rfl:     carvediloL (COREG) 12.5 MG tablet, Take 12.5 mg by mouth 2 (two) times daily., Disp: , Rfl:     clopidogreL (PLAVIX) 75 mg tablet, Take 75 mg by mouth once daily., Disp: , Rfl:     FLUoxetine 20 MG capsule, Take 20 mg by mouth once daily., Disp: , Rfl:     furosemide (LASIX) 40 MG tablet, TAKE 1 TABLET BY MOUTH TWICE DAILY IN THE MORNING AND AT LUNCH, Disp: , Rfl:     pantoprazole (PROTONIX) 40 MG tablet, Take 40 mg by mouth once daily at 6am., Disp: , Rfl:     SANTYL ointment, Apply 1 application topically once daily at 6am., Disp: , Rfl:     spironolactone-hydrochlorothiazide 25-25mg (ALDACTAZIDE) 25-25 mg Tab, , Disp: , Rfl:     valsartan (DIOVAN) 160 MG tablet, Take 160 mg by mouth 2 (two) times daily., Disp: , Rfl:  Review of patient's allergies indicates:  No Known Allergies      [facility]  TIMES PER WEEK/DAYS:  3 x weekly, Friday/Sunday/Tuesday  ORDERS:  Left leg wound: Cleanse with wound cleanser, apply santyl, adaptic touch, 4x4 gauze, kerlix and coban to be changed on Friday, Sunday, and Tuesday by home health   Right leg wound: Cleanse with wound cleanser, apply collagen powder, kerlix and coban to be changed on Friday, Sunday, and Tuesday by home health    Patient Orders:  Follow up in 1 week (on 6/29/2022) for bilateral legs .      Electronically signed:  Bonnie Portillo NP    This note was created using [employer] voice recognition software that occasionally misinterprets phrases or words.

## 2022-06-29 ENCOUNTER — HOSPITAL ENCOUNTER (OUTPATIENT)
Dept: WOUND CARE | Facility: HOSPITAL | Age: 82
Discharge: HOME | End: 2022-06-29
Attending: NURSE PRACTITIONER
Payer: MEDICARE

## 2022-06-29 VITALS
SYSTOLIC BLOOD PRESSURE: 127 MMHG | HEART RATE: 57 BPM | HEIGHT: 72 IN | WEIGHT: 293 LBS | DIASTOLIC BLOOD PRESSURE: 58 MMHG | BODY MASS INDEX: 39.68 KG/M2 | RESPIRATION RATE: 20 BRPM

## 2022-06-29 DIAGNOSIS — L97.919 IDIOPATHIC CHRONIC VENOUS HYPERTENSION OF RIGHT LOWER EXTREMITY WITH ULCER: ICD-10-CM

## 2022-06-29 DIAGNOSIS — L97.922 NON-PRESSURE CHRONIC ULCER OF LEFT LOWER LEG WITH FAT LAYER EXPOSED: Primary | ICD-10-CM

## 2022-06-29 DIAGNOSIS — I87.311 IDIOPATHIC CHRONIC VENOUS HYPERTENSION OF RIGHT LOWER EXTREMITY WITH ULCER: ICD-10-CM

## 2022-06-29 DIAGNOSIS — L97.912 NON-PRESSURE CHRONIC ULCER OF RIGHT LOWER LEG WITH FAT LAYER EXPOSED: ICD-10-CM

## 2022-06-29 DIAGNOSIS — L97.929 IDIOPATHIC CHRONIC VENOUS HYPERTENSION OF LEFT LOWER EXTREMITY WITH ULCER: ICD-10-CM

## 2022-06-29 DIAGNOSIS — I87.312 IDIOPATHIC CHRONIC VENOUS HYPERTENSION OF LEFT LOWER EXTREMITY WITH ULCER: ICD-10-CM

## 2022-06-29 PROCEDURE — 29580 STRAPPING UNNA BOOT: CPT

## 2022-06-29 PROCEDURE — 97598 DBRDMT OPN WND ADDL 20CM/<: CPT

## 2022-06-29 PROCEDURE — 99214 OFFICE O/P EST MOD 30 MIN: CPT

## 2022-06-29 PROCEDURE — 27000999 HC MEDICAL RECORD PHOTO DOCUMENTATION

## 2022-06-29 PROCEDURE — 97597 DBRDMT OPN WND 1ST 20 CM/<: CPT

## 2022-06-29 NOTE — PROGRESS NOTES
Subjective:       Patient ID: Jil Graves is a 82 y.o. female.    Chief Complaint: Venous Stasis (Bilateral lower leg wounds)    HPI    Ms. Graves returns today for follow up for bilateral lower extremity venous ulcers.    The wound on the left lower extremity has a thickened layer of slough.  She does continue to use Santyl on this wound, but only twice weekly due to the Unna boot that she wears.  We will consider the ultrasonic debrider at the next visit if necessary.    The right lower extremity continues to progress well.      We will continue applying Unna boots bilaterally.      Review of Systems   Musculoskeletal: Positive for gait problem.   Skin: Positive for wound.   All other systems reviewed and are negative.        Objective:      Vitals:    06/29/22 1025   BP: (!) 127/58   Pulse: (!) 57   Resp: 20       Physical Exam  Vitals reviewed. Exam conducted with a chaperone present (Daughter).   Constitutional:       Appearance: She is obese.   HENT:      Head: Normocephalic.   Cardiovascular:      Rate and Rhythm: Normal rate.      Pulses: Normal pulses.   Pulmonary:      Effort: Pulmonary effort is normal.      Comments: SOB with exertion  Musculoskeletal:      Right lower leg: Edema present.      Left lower leg: Edema present.      Comments: Edema is improving.  Ambulation difficult due to knee pain.    Skin:     General: Skin is warm and dry.      Findings: Wound present.      Comments: Bilateral lower extremity wounds.  Callus to left 2nd toe.   Neurological:      General: No focal deficit present.      Mental Status: She is alert and oriented to person, place, and time.   Psychiatric:         Mood and Affect: Mood normal.            Wound 05/11/22 1157 Venous Ulcer Left lower;lateral Leg #1 (Active)   05/11/22 1157    Pre-existing:    Primary Wound Type: Venous ulcer   Side: Left   Orientation: lower;lateral   Location: Leg   Wound Number: #1   Ankle-Brachial Index:    Pulses:    Removal Indication  and Assessment:    Wound Outcome:    (Retired) Wound Type:    (Retired) Wound Length (cm):    (Retired) Wound Width (cm):    (Retired) Depth (cm):    Wound Description (Comments):    Removal Indications:    Dressing Appearance Moist drainage 06/29/22 1026   Drainage Amount Moderate 06/29/22 1026   Drainage Characteristics/Odor Serosanguineous 06/29/22 1026   Appearance Red;Slough;Moist 06/29/22 1026   Tissue loss description Full thickness 06/29/22 1026   Periwound Area Intact 06/29/22 1026   Wound Edges Open 06/29/22 1026   Wound Length (cm) 8 cm 06/29/22 1026   Wound Width (cm) 7.5 cm 06/29/22 1026   Wound Depth (cm) 0.4 cm 06/29/22 1026   Wound Volume (cm^3) 24 cm^3 06/29/22 1026   Wound Surface Area (cm^2) 60 cm^2 06/29/22 1026   Care Cleansed with:;Wound cleanser 06/29/22 1026   Dressing Applied 06/29/22 1026   Compression Unna's Boot;Two layer compression 06/29/22 1026   Dressing Change Due 07/03/22 06/29/22 1026            Wound 05/11/22 1157 Venous Ulcer Right lower;medial #2 (Active)   05/11/22 1157    Pre-existing:    Primary Wound Type: Venous ulcer   Side: Right   Orientation: lower;medial   Location:    Wound Number: #2   Ankle-Brachial Index:    Pulses:    Removal Indication and Assessment:    Wound Outcome:    (Retired) Wound Type:    (Retired) Wound Length (cm):    (Retired) Wound Width (cm):    (Retired) Depth (cm):    Wound Description (Comments):    Removal Indications:    Dressing Appearance Moist drainage 06/29/22 1026   Drainage Amount Moderate 06/29/22 1026   Drainage Characteristics/Odor Serosanguineous 06/29/22 1026   Appearance Red;Moist 06/29/22 1026   Tissue loss description Full thickness 06/29/22 1026   Periwound Area Intact 06/29/22 1026   Wound Edges Open 06/29/22 1026   Wound Length (cm) 0.8 cm 06/29/22 1026   Wound Width (cm) 0.8 cm 06/29/22 1026   Wound Depth (cm) 0.2 cm 06/29/22 1026   Wound Volume (cm^3) 0.128 cm^3 06/29/22 1026   Wound Surface Area (cm^2) 0.64 cm^2 06/29/22 1026  "  Care Cleansed with:;Wound cleanser 06/29/22 1026   Dressing Applied 06/29/22 1026   Compression Unna's Boot;Two layer compression 06/29/22 1026   Dressing Change Due 07/03/22 06/29/22 1026          Right lower leg    Left lower leg    Right lower leg:  endoform, calcium alginate, zinc unna boot, kerlix, coban  Left lower leg:  santyl, mesalt, calcium alginate, zinc unna boot, kerlix, coban  ML      Assessment:         ICD-10-CM ICD-9-CM   1. Non-pressure chronic ulcer of left lower leg with fat layer exposed  L97.922 707.10   2. Non-pressure chronic ulcer of right lower leg with fat layer exposed  L97.912 707.10   3. Idiopathic chronic venous hypertension of right lower extremity with ulcer  I87.311 459.31    L97.919    4. Idiopathic chronic venous hypertension of left lower extremity with ulcer  I87.312 459.31    L97.929          Procedures:   Excisional debridement performed:  [] Yes [x] No   Selective debridement performed:  [x] Yes [] No  Bilateral lower extremities  Mechanical debridement performed:  [] Yes [x] No   Silver nitrate applied:    [] Yes [x] No   Labs ordered this visit:   [] Yes [x] No   Imaging ordered this visit:   [] Yes [x] No   Tissue pathology and/or culture taken:  [] Yes [x] No     Procedures:  Debridement     Date/Time: 6/29/2022 9:20 AM  Performed by: Bonnie Portillo NP  Authorized by: Bonnie Portillo NP     Time out: Immediately prior to procedure a "time out" was called to verify the correct patient, procedure, equipment, support staff and site/side marked as required.   Consent Done?:  Yes (Verbal)  Local anesthesia used?: No       Wound Details:    Location:  Left leg    Type of Debridement:  Non-excisional       Length (cm):  8       Area (sq cm):  60       Width (cm):  7.5       Percent Debrided (%):  60       Depth (cm):  0.4       Total Area Debrided (sq cm):  36    Depth of debridement:  Epidermis/Dermis    Devitalized tissue debrided:  Biofilm, Exudate, Fibrin and Slough   "  Instruments:  Forceps and Other ([place])     2nd Wound Details:     Location:  Right leg    Type of Debridement:  Non-excisional       Length (cm):  0.8       Area (sq cm):  0.64       Width (cm):  0.8       Percent Debrided (%):  100       Depth (cm):  0.2       Total Area Debrided (sq cm):  0.64    Depth of debridement:  Epidermis/Dermis    Devitalized tissue debrided:  Biofilm, Exudate, Fibrin and Slough    Instruments:  Forceps and Other ([place])     Bleeding:  None  Patient tolerance:  Patient tolerated the procedure well with no immediate complications      ML    MEDICATIONS    Current Outpatient Medications:     amLODIPine (NORVASC) 2.5 MG tablet, Take 2.5 mg by mouth once daily., Disp: , Rfl:     carvediloL (COREG) 12.5 MG tablet, Take 12.5 mg by mouth 2 (two) times daily., Disp: , Rfl:     clopidogreL (PLAVIX) 75 mg tablet, Take 75 mg by mouth once daily., Disp: , Rfl:     FLUoxetine 20 MG capsule, Take 20 mg by mouth once daily., Disp: , Rfl:     furosemide (LASIX) 40 MG tablet, TAKE 1 TABLET BY MOUTH TWICE DAILY IN THE MORNING AND AT LUNCH, Disp: , Rfl:     pantoprazole (PROTONIX) 40 MG tablet, Take 40 mg by mouth once daily at 6am., Disp: , Rfl:     SANTYL ointment, Apply 1 application topically once daily at 6am., Disp: , Rfl:     spironolactone-hydrochlorothiazide 25-25mg (ALDACTAZIDE) 25-25 mg Tab, , Disp: , Rfl:     valsartan (DIOVAN) 160 MG tablet, Take 160 mg by mouth 2 (two) times daily., Disp: , Rfl:  Review of patient's allergies indicates:  No Known Allergies    [facility]  TIMES PER WEEK/DAYS:  1 x weekly, Sunday  ORDERS:    Right medial lower leg wound: **Do NOT remove dressing until Sunday** On Sunday Cleanse with saline and apply endoform and calcium alginate to the wound bed; reapply zinc unna boot, wrapping leg in kerlix and coban. (HH to change on Sunday and WC to change on Wednesday)   Left lateral lower leg wound: **Do NOT remove dressing until Sunday** On  Sunday Cleanse with saline and apply santyl (nickel thick), mesalt and calcium alginate to the wound bed; reapply zinc unna boot, wrapping leg in kerlix and coban. (HH to change on Sunday and WC to change  on Wednesday)    Patient Orders:  Follow up in about 1 week (around 7/6/2022) for Bilateral lower leg wounds - venous ucler.      Electronically signed:  Bonnie Portillo NP    This note was created using [employer] voice recognition software that occasionally misinterprets phrases or words.

## 2022-07-06 ENCOUNTER — HOSPITAL ENCOUNTER (OUTPATIENT)
Dept: WOUND CARE | Facility: HOSPITAL | Age: 82
Discharge: HOME | End: 2022-07-06
Attending: NURSE PRACTITIONER
Payer: MEDICARE

## 2022-07-06 VITALS
BODY MASS INDEX: 39.68 KG/M2 | DIASTOLIC BLOOD PRESSURE: 63 MMHG | SYSTOLIC BLOOD PRESSURE: 138 MMHG | RESPIRATION RATE: 20 BRPM | HEIGHT: 72 IN | HEART RATE: 63 BPM | WEIGHT: 293 LBS

## 2022-07-06 DIAGNOSIS — I87.311 IDIOPATHIC CHRONIC VENOUS HYPERTENSION OF RIGHT LOWER EXTREMITY WITH ULCER: ICD-10-CM

## 2022-07-06 DIAGNOSIS — L97.929 IDIOPATHIC CHRONIC VENOUS HYPERTENSION OF LEFT LOWER EXTREMITY WITH ULCER: ICD-10-CM

## 2022-07-06 DIAGNOSIS — L97.912 NON-PRESSURE CHRONIC ULCER OF RIGHT LOWER LEG WITH FAT LAYER EXPOSED: ICD-10-CM

## 2022-07-06 DIAGNOSIS — L97.919 IDIOPATHIC CHRONIC VENOUS HYPERTENSION OF RIGHT LOWER EXTREMITY WITH ULCER: ICD-10-CM

## 2022-07-06 DIAGNOSIS — I87.312 IDIOPATHIC CHRONIC VENOUS HYPERTENSION OF LEFT LOWER EXTREMITY WITH ULCER: ICD-10-CM

## 2022-07-06 DIAGNOSIS — L97.922 NON-PRESSURE CHRONIC ULCER OF LEFT LOWER LEG WITH FAT LAYER EXPOSED: Primary | ICD-10-CM

## 2022-07-06 PROCEDURE — 99213 OFFICE O/P EST LOW 20 MIN: CPT

## 2022-07-06 PROCEDURE — 27000999 HC MEDICAL RECORD PHOTO DOCUMENTATION

## 2022-07-06 RX ORDER — AMLODIPINE BESYLATE 10 MG/1
TABLET ORAL
COMMUNITY
Start: 2022-07-01 | End: 2022-09-14 | Stop reason: ALTCHOICE

## 2022-07-06 RX ORDER — SPIRONOLACTONE 25 MG/1
12.5 TABLET ORAL DAILY
COMMUNITY
Start: 2022-07-01 | End: 2022-09-14 | Stop reason: ALTCHOICE

## 2022-07-07 NOTE — PROGRESS NOTES
Subjective:       Patient ID: Jil Graves is a 82 y.o. female.    Chief Complaint: Venous Ulcer (Right medial lower leg /Left lateral lower leg )    HPI  Ms. Graves returns today for follow up for bilateral lower extremity venous ulcers.     The wound on the left lower extremity has a thickened layer of slough.  She does continue to use Santyl on this wound, but only twice weekly due to the Unna boot that she wears.  Today we will hold the Unna boots to allow the Santyl the opportunity to clean the wound bed.  HH will visit twice weekly for the time being using kerlix and coban rather than unna boots.  We will monitor for deterioration with this change.     The right lower extremity continues to progress well.  We will continue to apply endoform to this wound bed.     She saw her PCP, Dr. Melendez, last Wednesday, no medication changes.      Review of Systems   Musculoskeletal: Positive for gait problem.   Skin: Positive for wound.   All other systems reviewed and are negative.        Objective:      Vitals:    07/06/22 1211   BP: 138/63   Pulse: 63   Resp: 20       Physical Exam  Vitals reviewed. Exam conducted with a chaperone present (Daughter).   Constitutional:       Appearance: She is obese.   HENT:      Head: Normocephalic.   Cardiovascular:      Rate and Rhythm: Normal rate.      Pulses: Normal pulses.   Pulmonary:      Effort: Pulmonary effort is normal.      Comments: SOB with exertion  Musculoskeletal:      Right lower leg: Edema present.      Left lower leg: Edema present.      Comments: Edema is improving.  Ambulation difficult due to knee pain.    Skin:     General: Skin is warm and dry.      Findings: Wound present.      Comments: Bilateral lower extremity wounds.  Callus to left 2nd toe.   Neurological:      General: No focal deficit present.      Mental Status: She is alert and oriented to person, place, and time.   Psychiatric:         Mood and Affect: Mood normal.            Wound 05/11/22  1157 Venous Ulcer Left lower;lateral Leg #1 (Active)   05/11/22 1157    Pre-existing:    Primary Wound Type: Venous ulcer   Side: Left   Orientation: lower;lateral   Location: Leg   Wound Number: #1   Ankle-Brachial Index:    Pulses:    Removal Indication and Assessment:    Wound Outcome:    (Retired) Wound Type:    (Retired) Wound Length (cm):    (Retired) Wound Width (cm):    (Retired) Depth (cm):    Wound Description (Comments):    Removal Indications:    Dressing Appearance Intact 07/06/22 1221   Drainage Amount Moderate 07/06/22 1221   Drainage Characteristics/Odor Clear;Serous 07/06/22 1221   Appearance Granulating;Slough 07/06/22 1221   Tissue loss description Full thickness 07/06/22 1221   Red (%), Wound Tissue Color 25 % 07/06/22 1221   Yellow (%), Wound Tissue Color 75 % 07/06/22 1221   Periwound Area Intact 07/06/22 1221   Wound Edges Irregular 07/06/22 1221   Wound Length (cm) 7.8 cm 07/06/22 1221   Wound Width (cm) 7 cm 07/06/22 1221   Wound Depth (cm) 0.4 cm 07/06/22 1221   Wound Volume (cm^3) 21.84 cm^3 07/06/22 1221   Wound Surface Area (cm^2) 54.6 cm^2 07/06/22 1221   Care Wound cleanser 07/06/22 1221   Dressing Applied 07/06/22 1221            Wound 05/11/22 1157 Venous Ulcer Right lower;medial #2 (Active)   05/11/22 1157    Pre-existing:    Primary Wound Type: Venous ulcer   Side: Right   Orientation: lower;medial   Location:    Wound Number: #2   Ankle-Brachial Index:    Pulses:    Removal Indication and Assessment:    Wound Outcome:    (Retired) Wound Type:    (Retired) Wound Length (cm):    (Retired) Wound Width (cm):    (Retired) Depth (cm):    Wound Description (Comments):    Removal Indications:    Dressing Appearance Intact 07/06/22 1221   Drainage Amount Moderate 07/06/22 1221   Drainage Characteristics/Odor Clear;Serous 07/06/22 1221   Appearance Granulating;Slough 07/06/22 1221   Tissue loss description Full thickness 07/06/22 1221   Periwound Area Intact;Edematous 07/06/22 1221   Wound  "Edges Irregular 07/06/22 1221   Wound Length (cm) 0.4 cm 07/06/22 1221   Wound Width (cm) 0.6 cm 07/06/22 1221   Wound Depth (cm) 0.2 cm 07/06/22 1221   Wound Volume (cm^3) 0.048 cm^3 07/06/22 1221   Wound Surface Area (cm^2) 0.24 cm^2 07/06/22 1221   Dressing Applied 07/06/22 1221         Left lower leg, pre    post   Santyl, mesalt, gauze, abd, kerlix, coban      Rt lower leg   endoform, island dressing  LR        Assessment:         ICD-10-CM ICD-9-CM   1. Non-pressure chronic ulcer of left lower leg with fat layer exposed  L97.922 707.10   2. Non-pressure chronic ulcer of right lower leg with fat layer exposed  L97.912 707.10   3. Idiopathic chronic venous hypertension of right lower extremity with ulcer  I87.311 459.31    L97.919    4. Idiopathic chronic venous hypertension of left lower extremity with ulcer  I87.312 459.31    L97.929            Procedures:   Debridement     Date/Time: 7/6/2022 11:40 AM  Performed by: Bonnie Portillo NP  Authorized by: Bonnie Portillo NP     Time out: Immediately prior to procedure a "time out" was called to verify the correct patient, procedure, equipment, support staff and site/side marked as required.   Consent Done?:  Yes (Verbal)  Local anesthesia used?: No       Wound Details:    Location:  Left leg    Type of Debridement:  Non-excisional       Length (cm):  7.8       Area (sq cm):  54.6       Width (cm):  7       Percent Debrided (%):  100       Depth (cm):  0.4       Total Area Debrided (sq cm):  54.6    Depth of debridement:  Epidermis/Dermis    Devitalized tissue debrided:  Biofilm, Exudate, Fibrin and Slough    Instruments:  Forceps, Scissors and Other ([place])      LR    Excisional debridement performed:          [] Yes [x] No   Selective debridement performed:           [x] Yes [] No   Mechanical debridement performed:        [] Yes [x] No   Silver nitrate applied:                                  [] Yes [x] No   Labs ordered this visit:                          "       [] Yes [x] No   Imaging ordered this visit:                          [] Yes [x] No   Tissue pathology and/or culture taken:    [] Yes [x] No        MEDICATIONS    Current Outpatient Medications:     amLODIPine (NORVASC) 10 MG tablet, , Disp: , Rfl:     carvediloL (COREG) 12.5 MG tablet, Take 12.5 mg by mouth 2 (two) times daily., Disp: , Rfl:     clopidogreL (PLAVIX) 75 mg tablet, Take 75 mg by mouth once daily., Disp: , Rfl:     FLUoxetine 20 MG capsule, Take 20 mg by mouth once daily., Disp: , Rfl:     furosemide (LASIX) 40 MG tablet, TAKE 1 TABLET BY MOUTH TWICE DAILY IN THE MORNING AND AT LUNCH, Disp: , Rfl:     pantoprazole (PROTONIX) 40 MG tablet, Take 40 mg by mouth once daily at 6am., Disp: , Rfl:     SANTYL ointment, Apply 1 application topically once daily at 6am., Disp: , Rfl:     spironolactone-hydrochlorothiazide 25-25mg (ALDACTAZIDE) 25-25 mg Tab, , Disp: , Rfl:     valsartan (DIOVAN) 160 MG tablet, Take 160 mg by mouth 2 (two) times daily., Disp: , Rfl:     amLODIPine (NORVASC) 2.5 MG tablet, Take 2.5 mg by mouth once daily., Disp: , Rfl:     spironolactone (ALDACTONE) 25 MG tablet, Take 12.5 mg by mouth once daily., Disp: , Rfl:  Review of patient's allergies indicates:  No Known Allergies      HOME HEALTH [facility]ENCY:  [facility]  TIMES PER WEEK/DAYS:  2 x weekly, Monday/Friday  Wound Care Orders:  Left lower leg-Cleanse with wound cleanser. Apply Santyl nickel deep to wound bed, cover with mesalt, gauze, wrap with kerlix and coban. Home health to change on Friday and Monday.  Right leg-Leave current dressing of Endoform in place until Monday. Monday remove dressing, cleanse wound bed with wound cleanser cover with island dressing. Change as needed.  Return to clinic in one week  Follow up in about 1 week (around 7/13/2022).        Electronically signed:  Bonnie Portillo NP

## 2022-07-20 ENCOUNTER — HOSPITAL ENCOUNTER (OUTPATIENT)
Dept: WOUND CARE | Facility: HOSPITAL | Age: 82
Discharge: HOME | End: 2022-07-20
Attending: NURSE PRACTITIONER
Payer: MEDICARE

## 2022-07-20 VITALS
SYSTOLIC BLOOD PRESSURE: 140 MMHG | HEART RATE: 70 BPM | HEIGHT: 72 IN | RESPIRATION RATE: 18 BRPM | BODY MASS INDEX: 39.68 KG/M2 | DIASTOLIC BLOOD PRESSURE: 74 MMHG | WEIGHT: 293 LBS

## 2022-07-20 DIAGNOSIS — L97.919 IDIOPATHIC CHRONIC VENOUS HYPERTENSION OF RIGHT LOWER EXTREMITY WITH ULCER: ICD-10-CM

## 2022-07-20 DIAGNOSIS — L97.929 IDIOPATHIC CHRONIC VENOUS HYPERTENSION OF LEFT LOWER EXTREMITY WITH ULCER: ICD-10-CM

## 2022-07-20 DIAGNOSIS — L97.912 NON-PRESSURE CHRONIC ULCER OF RIGHT LOWER LEG WITH FAT LAYER EXPOSED: ICD-10-CM

## 2022-07-20 DIAGNOSIS — L97.922 NON-PRESSURE CHRONIC ULCER OF LEFT LOWER LEG WITH FAT LAYER EXPOSED: Primary | ICD-10-CM

## 2022-07-20 DIAGNOSIS — I87.311 IDIOPATHIC CHRONIC VENOUS HYPERTENSION OF RIGHT LOWER EXTREMITY WITH ULCER: ICD-10-CM

## 2022-07-20 DIAGNOSIS — I87.312 IDIOPATHIC CHRONIC VENOUS HYPERTENSION OF LEFT LOWER EXTREMITY WITH ULCER: ICD-10-CM

## 2022-07-20 PROCEDURE — 29580 STRAPPING UNNA BOOT: CPT

## 2022-07-20 PROCEDURE — 27000999 HC MEDICAL RECORD PHOTO DOCUMENTATION

## 2022-07-20 PROCEDURE — 97597 DBRDMT OPN WND 1ST 20 CM/<: CPT

## 2022-07-20 PROCEDURE — 99212 OFFICE O/P EST SF 10 MIN: CPT | Mod: 25

## 2022-07-20 NOTE — PROGRESS NOTES
Subjective:       Patient ID: Jil Graves is a 82 y.o. female.    Chief Complaint: Venous Stasis (Right medial lower leg/Left lateral lower leg )    HPI    Ms. Graves returns today for follow up for bilateral lower extremity venous ulcers.     The wound on the left lower extremity has a thickened layer of slough.  She does continue to use Santyl on this wound, but only twice weekly due to the Unna boot that she wears.  At her last visit we decided to hold the Unna boots to allow the Santyl the opportunity to clean the wound bed.  With this change, however, the patient's legs have quickly developed 3+ edema again.  Therefore we will return again to use of Unna boots bilaterally.    HH will visit once weekly for Unna boot changes.    The right lower extremity continues to progress well but is also quite edematous.  We will begin applying Santyl and Mesalt again on this wound, under the Unna boot.     Review of Systems      Objective:      Vitals:    07/20/22 1012   BP: (!) 140/74   Pulse: 70   Resp: 18       Physical Exam       Wound 05/11/22 1157 Venous Ulcer Left lower;lateral Leg #1 (Active)   05/11/22 1157    Pre-existing:    Primary Wound Type: Venous ulcer   Side: Left   Orientation: lower;lateral   Location: Leg   Wound Number: #1   Ankle-Brachial Index:    Pulses:    Removal Indication and Assessment:    Wound Outcome:    (Retired) Wound Type:    (Retired) Wound Length (cm):    (Retired) Wound Width (cm):    (Retired) Depth (cm):    Wound Description (Comments):    Removal Indications:    Dressing Appearance Moist drainage 07/20/22 0944   Drainage Amount Moderate 07/20/22 0944   Drainage Characteristics/Odor Serosanguineous 07/20/22 0944   Appearance Slough;Green;Moist 07/20/22 0944   Tissue loss description Full thickness 07/20/22 0944   Periwound Area Intact 07/20/22 0944   Wound Edges Open 07/20/22 0944   Wound Length (cm) 8 cm 07/20/22 0944   Wound Width (cm) 8.4 cm 07/20/22 0944   Wound Depth (cm)  0.4 cm 07/20/22 0944   Wound Volume (cm^3) 26.88 cm^3 07/20/22 0944   Wound Surface Area (cm^2) 67.2 cm^2 07/20/22 0944   Care Cleansed with:;Wound cleanser 07/20/22 0944   Dressing Applied 07/20/22 0944   Compression Unna's Boot;Two layer compression 07/20/22 0944   Dressing Change Due 07/22/22 07/20/22 0944            Wound 05/11/22 1157 Venous Ulcer Right lower;medial #2 (Active)   05/11/22 1157    Pre-existing:    Primary Wound Type: Venous ulcer   Side: Right   Orientation: lower;medial   Location:    Wound Number: #2   Ankle-Brachial Index:    Pulses:    Removal Indication and Assessment:    Wound Outcome:    (Retired) Wound Type:    (Retired) Wound Length (cm):    (Retired) Wound Width (cm):    (Retired) Depth (cm):    Wound Description (Comments):    Removal Indications:    Dressing Appearance Dry 07/20/22 0944   Drainage Amount None 07/20/22 0944   Appearance Dry 07/20/22 0944   Periwound Area Intact 07/20/22 0944   Wound Length (cm) 0.1 cm 07/20/22 0944   Wound Width (cm) 0.1 cm 07/20/22 0944   Wound Depth (cm) 0.1 cm 07/20/22 0944   Wound Volume (cm^3) 0.001 cm^3 07/20/22 0944   Wound Surface Area (cm^2) 0.01 cm^2 07/20/22 0944   Care Cleansed with:;Wound cleanser 07/20/22 0944   Dressing Applied 07/20/22 0944   Compression Unna's Boot;Two layer compression 07/20/22 0944   Dressing Change Due 07/22/22 07/20/22 0944         Left lower leg, pre      post      Right lower leg   ML - Bilateral legs:  santyl, calcaium alginte, zinc unna boot, kerlix, coban      Assessment:         ICD-10-CM ICD-9-CM   1. Non-pressure chronic ulcer of left lower leg with fat layer exposed  L97.922 707.10   2. Non-pressure chronic ulcer of right lower leg with fat layer exposed  L97.912 707.10   3. Idiopathic chronic venous hypertension of right lower extremity with ulcer  I87.311 459.31    L97.919    4. Idiopathic chronic venous hypertension of left lower extremity with ulcer  I87.312 459.31    L97.929            Procedures:  "    Debridement     Date/Time: 7/20/2022 9:20 AM  Performed by: Bonnie Portillo NP  Authorized by: Bonnie Portillo NP     Time out: Immediately prior to procedure a "time out" was called to verify the correct patient, procedure, equipment, support staff and site/side marked as required.   Consent Done?:  Yes (Verbal)  Local anesthesia used?: No       Wound Details:    Location:  Left leg    Type of Debridement:  Non-excisional       Length (cm):  8       Area (sq cm):  67.2       Width (cm):  8.4       Percent Debrided (%):  80       Depth (cm):  0.4       Total Area Debrided (sq cm):  53.76    Depth of debridement:  Epidermis/Dermis    Devitalized tissue debrided:  Biofilm, Exudate, Fibrin and Slough    Instruments:  Forceps and Other ([place])     2nd Wound Details:     Location:  Right leg    Type of Debridement:  Non-excisional       Length (cm):  0.1       Area (sq cm):  0.01       Width (cm):  0.1       Percent Debrided (%):  100       Depth (cm):  0.1       Total Area Debrided (sq cm):  0.01    Depth of debridement:  Epidermis/Dermis    Devitalized tissue debrided:  Biofilm, Exudate, Fibrin and Slough    Instruments:  Other ([place])      ML     Excisional debridement performed:          [] Yes [x] No   Selective debridement performed:           [x] Yes [] No   Mechanical debridement performed:        [] Yes [x] No   Silver nitrate applied:                                  [] Yes [x] No   Labs ordered this visit:                                [] Yes [x] No   Imaging ordered this visit:                          [] Yes [x] No   Tissue pathology and/or culture taken:    [] Yes [x] No      MEDICATIONS    Current Outpatient Medications:     amLODIPine (NORVASC) 10 MG tablet, , Disp: , Rfl:     amLODIPine (NORVASC) 2.5 MG tablet, Take 2.5 mg by mouth once daily., Disp: , Rfl:     carvediloL (COREG) 12.5 MG tablet, Take 12.5 mg by mouth 2 (two) times daily., Disp: , Rfl:     clopidogreL (PLAVIX) 75 mg tablet, " Take 75 mg by mouth once daily., Disp: , Rfl:     FLUoxetine 20 MG capsule, Take 20 mg by mouth once daily., Disp: , Rfl:     furosemide (LASIX) 40 MG tablet, TAKE 1 TABLET BY MOUTH TWICE DAILY IN THE MORNING AND AT LUNCH, Disp: , Rfl:     pantoprazole (PROTONIX) 40 MG tablet, Take 40 mg by mouth once daily at 6am., Disp: , Rfl:     SANTYL ointment, Apply 1 application topically once daily at 6am., Disp: , Rfl:     spironolactone (ALDACTONE) 25 MG tablet, Take 12.5 mg by mouth once daily., Disp: , Rfl:     spironolactone-hydrochlorothiazide 25-25mg (ALDACTAZIDE) 25-25 mg Tab, , Disp: , Rfl:     valsartan (DIOVAN) 160 MG tablet, Take 160 mg by mouth 2 (two) times daily., Disp: , Rfl:  Review of patient's allergies indicates:  No Known Allergies      [facility]ALTH [facility]ENCY:  [facility]  TIMES PER WEEK/DAYS:  1 x weekly, Friday  WOUND CARE ORDERS:  Left lateral lower leg: Cleanse with saline and apply santyl (nickel thick) to the wound bed and cover with calcium alginate. Reapply zinc unna boot, kerlix and coban. - to be changed by HH on Friday and patient to return to  on Tuesday.   Right medial lower leg: Cleanse with saline and apply santyl (nickel thick) to the wound bed and cover with calcium alginate. Reapply zinc unna boot, kerlix and coban - to be changed by HH on Friday and patient to return to  on Tuesday.  **HH to order farrow wrap for right leg**    Follow up in about 6 days (around 7/26/2022) for venous stasis - right medial lower leg/left lateral lower leg.        Electronically signed:  Bonnie Portillo NP

## 2022-07-26 ENCOUNTER — HOSPITAL ENCOUNTER (OUTPATIENT)
Dept: WOUND CARE | Facility: HOSPITAL | Age: 82
Discharge: HOME | End: 2022-07-26
Attending: NURSE PRACTITIONER
Payer: MEDICARE

## 2022-07-26 VITALS
HEIGHT: 72 IN | DIASTOLIC BLOOD PRESSURE: 63 MMHG | SYSTOLIC BLOOD PRESSURE: 133 MMHG | WEIGHT: 293 LBS | BODY MASS INDEX: 39.68 KG/M2 | RESPIRATION RATE: 18 BRPM | HEART RATE: 56 BPM

## 2022-07-26 DIAGNOSIS — L60.2 ONYCHAUXIS: ICD-10-CM

## 2022-07-26 DIAGNOSIS — I87.311 IDIOPATHIC CHRONIC VENOUS HYPERTENSION OF RIGHT LOWER EXTREMITY WITH ULCER: ICD-10-CM

## 2022-07-26 DIAGNOSIS — L97.919 IDIOPATHIC CHRONIC VENOUS HYPERTENSION OF RIGHT LOWER EXTREMITY WITH ULCER: ICD-10-CM

## 2022-07-26 DIAGNOSIS — I87.312 IDIOPATHIC CHRONIC VENOUS HYPERTENSION OF LEFT LOWER EXTREMITY WITH ULCER: ICD-10-CM

## 2022-07-26 DIAGNOSIS — L97.922 NON-PRESSURE CHRONIC ULCER OF LEFT LOWER LEG WITH FAT LAYER EXPOSED: Primary | ICD-10-CM

## 2022-07-26 DIAGNOSIS — L97.912 NON-PRESSURE CHRONIC ULCER OF RIGHT LOWER LEG WITH FAT LAYER EXPOSED: ICD-10-CM

## 2022-07-26 DIAGNOSIS — L97.929 IDIOPATHIC CHRONIC VENOUS HYPERTENSION OF LEFT LOWER EXTREMITY WITH ULCER: ICD-10-CM

## 2022-07-26 PROCEDURE — 11719 TRIM NAIL(S) ANY NUMBER: CPT

## 2022-07-26 PROCEDURE — 97597 DBRDMT OPN WND 1ST 20 CM/<: CPT

## 2022-07-26 PROCEDURE — 27000999 HC MEDICAL RECORD PHOTO DOCUMENTATION

## 2022-07-26 PROCEDURE — 99212 OFFICE O/P EST SF 10 MIN: CPT | Mod: 25

## 2022-07-26 NOTE — PROGRESS NOTES
Subjective:       Patient ID: Jil Graves is a 82 y.o. female.    Chief Complaint: Venous Stasis (Right medial lower leg/Left lateral lower leg )    HPI     Ms. Graves returns today for follow up for bilateral lower extremity venous ulcers.     The wound on the left lower extremity has a thickened layer of slough.  She does continue to use Santyl on this wound, but only twice weekly due to the Unna boot that she wears.  At her last visit we decided to hold the Unna boots to allow the Santyl the opportunity to clean the wound bed.  With this change, however, the patient's legs have quickly developed 3+ edema again.  Therefore we will return again to use of Unna boots bilaterally.    HH will visit once weekly for Unna boot changes.    The right lower extremity continues to progress well but is also quite edematous.  We will begin applying Santyl and Mesalt again on this wound, under the Unna boot.     [facility] E[facility]ada, confirmed with [facility] that they have measured the right lower extremity for [place] Wrap and it is expected in approximately 6-8 weeks.      Review of Systems   Musculoskeletal: Positive for gait problem.   Skin: Positive for wound.   All other systems reviewed and are negative.        Objective:      Vitals:    07/26/22 1203   BP: 133/63   Pulse: (!) 56   Resp: 18       Physical Exam  Vitals reviewed. Exam conducted with a chaperone present (Daughter).   Constitutional:       Appearance: She is obese.   HENT:      Head: Normocephalic.   Eyes:      Extraocular Movements: Extraocular movements intact.      Pupils: Pupils are equal, round, and reactive to light.   Cardiovascular:      Rate and Rhythm: Normal rate.      Pulses: Normal pulses.   Pulmonary:      Effort: Pulmonary effort is normal.      Comments: SOB with exertion  Musculoskeletal:      Right lower leg: Edema present.      Left lower leg: Edema present.      Comments: Edema is improving.  Ambulation difficult due to knee pain.    Skin:      General: Skin is warm and dry.      Findings: Wound present.      Comments: Bilateral lower extremity wounds.  Callus to left 2nd toe.   Neurological:      General: No focal deficit present.      Mental Status: She is alert and oriented to person, place, and time.   Psychiatric:         Mood and Affect: Mood normal.            Wound 05/11/22 1157 Venous Ulcer Left lower;lateral Leg #1 (Active)   05/11/22 1157    Pre-existing:    Primary Wound Type: Venous ulcer   Side: Left   Orientation: lower;lateral   Location: Leg   Wound Number: #1   Ankle-Brachial Index:    Pulses:    Removal Indication and Assessment:    Wound Outcome:    (Retired) Wound Type:    (Retired) Wound Length (cm):    (Retired) Wound Width (cm):    (Retired) Depth (cm):    Wound Description (Comments):    Removal Indications:    Dressing Appearance Moist drainage 07/26/22 1200   Drainage Amount Moderate 07/26/22 1200   Drainage Characteristics/Odor Serosanguineous 07/26/22 1200   Appearance Moist;Slough;Red 07/26/22 1200   Tissue loss description Full thickness 07/26/22 1200   Periwound Area Intact 07/26/22 1200   Wound Edges Open 07/26/22 1200   Wound Length (cm) 7.5 cm 07/26/22 1200   Wound Width (cm) 8.2 cm 07/26/22 1200   Wound Depth (cm) 0.3 cm 07/26/22 1200   Wound Volume (cm^3) 18.45 cm^3 07/26/22 1200   Wound Surface Area (cm^2) 61.5 cm^2 07/26/22 1200   Care Cleansed with:;Wound cleanser 07/26/22 1200   Dressing Applied 07/26/22 1200   Compression Unna's Boot;Two layer compression 07/26/22 1200   Dressing Change Due 07/29/22 07/26/22 1200            Wound 05/11/22 1157 Venous Ulcer Right lower;medial #2 (Active)   05/11/22 1157    Pre-existing:    Primary Wound Type: Venous ulcer   Side: Right   Orientation: lower;medial   Location:    Wound Number: #2   Ankle-Brachial Index:    Pulses:    Removal Indication and Assessment:    Wound Outcome:    (Retired) Wound Type:    (Retired) Wound Length (cm):    (Retired) Wound Width (cm):    (Retired)  "Depth (cm):    Wound Description (Comments):    Removal Indications:    Dressing Appearance Moist drainage 07/26/22 1200   Drainage Amount Moderate 07/26/22 1200   Drainage Characteristics/Odor Serosanguineous 07/26/22 1200   Appearance Pink;Moist 07/26/22 1200   Tissue loss description Full thickness 07/26/22 1200   Periwound Area Intact 07/26/22 1200   Wound Edges Open 07/26/22 1200   Wound Length (cm) 1.3 cm 07/26/22 1200   Wound Width (cm) 2 cm 07/26/22 1200   Wound Depth (cm) 0.2 cm 07/26/22 1200   Wound Volume (cm^3) 0.52 cm^3 07/26/22 1200   Wound Surface Area (cm^2) 2.6 cm^2 07/26/22 1200   Care Cleansed with:;Wound cleanser 07/26/22 1200   Dressing Applied 07/26/22 1200   Dressing Change Due 07/29/22 07/26/22 1200               Right lower leg       Left lower leg  Bilateral legs - santyl, silver alginate, ABD pad, zinc unna boot, kerlix, coban  ML  No post debridement photos        Assessment:         ICD-10-CM ICD-9-CM   1. Non-pressure chronic ulcer of left lower leg with fat layer exposed  L97.922 707.10   2. Non-pressure chronic ulcer of right lower leg with fat layer exposed  L97.912 707.10   3. Idiopathic chronic venous hypertension of right lower extremity with ulcer  I87.311 459.31    L97.919    4. 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   Consent Done?:  Yes (Verbal)  Local anesthesia used?: Yes    Local anesthetic:  Lidocaine 2% topical gel     Wound Details:    Location:  Left leg    Type of Debridement:  Non-excisional       Length (cm):  7.5       Area (sq cm):  61.5       Width (cm):  8.2       " Percent Debrided (%):  75       Depth (cm):  0.3       Total Area Debrided (sq cm):  46.13    Depth of debridement:  Epidermis/Dermis    Devitalized tissue debrided:  Biofilm, Exudate, Fibrin and Slough    Instruments:  Other ([place])     2nd Wound Details:     Location:  Right leg    Type of Debridement:  Non-excisional       Length (cm):  1.3       Area (sq cm):  2.6       Width (cm):  2       Percent Debrided (%):  100       Depth (cm):  0.2       Total Area Debrided (sq cm):  2.6    Depth of debridement:  Epidermis/Dermis    Devitalized tissue debrided:  Biofilm, Exudate, Fibrin and Slough    Instruments:  Other ([place])      ML       Excisional debridement performed:          [] Yes [x] No   Selective debridement performed:           [x] Yes [] No   Mechanical debridement performed:        [] Yes [x] No   Silver nitrate applied:                                  [] Yes [x] No   Labs ordered this visit:                                [] Yes [x] No   Imaging ordered this visit:                          [] Yes [x] No   Tissue pathology and/or culture taken:    [] Yes [x] No      MEDICATIONS    Current Outpatient Medications:     amLODIPine (NORVASC) 10 MG tablet, , Disp: , Rfl:     amLODIPine (NORVASC) 2.5 MG tablet, Take 2.5 mg by mouth once daily., Disp: , Rfl:     carvediloL (COREG) 12.5 MG tablet, Take 12.5 mg by mouth 2 (two) times daily., Disp: , Rfl:     clopidogreL (PLAVIX) 75 mg tablet, Take 75 mg by mouth once daily., Disp: , Rfl:     FLUoxetine 20 MG capsule, Take 20 mg by mouth once daily., Disp: , Rfl:     furosemide (LASIX) 40 MG tablet, TAKE 1 TABLET BY MOUTH TWICE DAILY IN THE MORNING AND AT LUNCH, Disp: , Rfl:     pantoprazole (PROTONIX) 40 MG tablet, Take 40 mg by mouth once daily at 6am., Disp: , Rfl:     SANTYL ointment, Apply 1 application topically once daily at 6am., Disp: , Rfl:     spironolactone (ALDACTONE) 25 MG tablet, Take 12.5 mg by mouth once daily., Disp: , Rfl:      spironolactone-hydrochlorothiazide 25-25mg (ALDACTAZIDE) 25-25 mg Tab, , Disp: , Rfl:     valsartan (DIOVAN) 160 MG tablet, Take 160 mg by mouth 2 (two) times daily., Disp: , Rfl:  Review of patient's allergies indicates:  No Known Allergies      [facility]ALTH AGENCY:  [facility]  TIMES PER WEEK/DAYS:  1 x weekly, Friday  WOUND CARE ORDERS:  Left lateral lower leg: Cleanse with saline and apply santyl (nickel thick) to the wound bed and cover with calcium alginate. Reapply zinc unna boot, kerlix and coban. - to be changed by HH on Friday and patient to return to  on Tuesday.   Right medial lower leg: Cleanse with saline and apply santyl (nickel thick) to the wound bed and cover with calcium alginate. Reapply zinc unna boot, kerlix and coban - to be changed by HH on Friday and patient to return to  on Tuesday.    Follow up in about 1 week (around 8/2/2022) for Bilateral leg wounds - Venous Stasis .        Electronically signed:  Bonnie oPrtillo NP

## 2022-08-02 ENCOUNTER — HOSPITAL ENCOUNTER (OUTPATIENT)
Dept: WOUND CARE | Facility: HOSPITAL | Age: 82
Discharge: HOME | End: 2022-08-02
Attending: NURSE PRACTITIONER
Payer: MEDICARE

## 2022-08-02 VITALS
SYSTOLIC BLOOD PRESSURE: 133 MMHG | WEIGHT: 293 LBS | HEART RATE: 59 BPM | BODY MASS INDEX: 39.68 KG/M2 | RESPIRATION RATE: 20 BRPM | DIASTOLIC BLOOD PRESSURE: 63 MMHG | HEIGHT: 72 IN

## 2022-08-02 DIAGNOSIS — L97.912 NON-PRESSURE CHRONIC ULCER OF RIGHT LOWER LEG WITH FAT LAYER EXPOSED: ICD-10-CM

## 2022-08-02 DIAGNOSIS — I87.312 IDIOPATHIC CHRONIC VENOUS HYPERTENSION OF LEFT LOWER EXTREMITY WITH ULCER: ICD-10-CM

## 2022-08-02 DIAGNOSIS — L97.922 NON-PRESSURE CHRONIC ULCER OF LEFT LOWER LEG WITH FAT LAYER EXPOSED: Primary | ICD-10-CM

## 2022-08-02 DIAGNOSIS — I87.311 IDIOPATHIC CHRONIC VENOUS HYPERTENSION OF RIGHT LOWER EXTREMITY WITH ULCER: ICD-10-CM

## 2022-08-02 DIAGNOSIS — L97.919 IDIOPATHIC CHRONIC VENOUS HYPERTENSION OF RIGHT LOWER EXTREMITY WITH ULCER: ICD-10-CM

## 2022-08-02 DIAGNOSIS — L97.929 IDIOPATHIC CHRONIC VENOUS HYPERTENSION OF LEFT LOWER EXTREMITY WITH ULCER: ICD-10-CM

## 2022-08-02 PROCEDURE — 97598 DBRDMT OPN WND ADDL 20CM/<: CPT

## 2022-08-02 PROCEDURE — 97597 DBRDMT OPN WND 1ST 20 CM/<: CPT

## 2022-08-02 PROCEDURE — 27000999 HC MEDICAL RECORD PHOTO DOCUMENTATION

## 2022-08-02 PROCEDURE — 99212 OFFICE O/P EST SF 10 MIN: CPT

## 2022-08-02 NOTE — PROGRESS NOTES
Subjective:       Patient ID: Jil Graves is a 82 y.o. female.    Chief Complaint: Venous Stasis (Right medial lower leg /Left lateral lower leg )    HPI     Ms. Graves returns today for follow up for bilateral lower extremity venous ulcers.     The wound on the left lower extremity has a thickened layer of slough.  She does continue to use Santyl on this wound, but only twice weekly due to the Unna boot that she wears.  At her last visit we decided to hold the Unna boots to allow the Santyl the opportunity to clean the wound bed.  With this change, however, the patient's legs have quickly developed 3+ edema again.  Therefore we returned again to use of Unna boots bilaterally.  Today the left extremity wound exudate does have a greenish tint.  A DNA culture was obtained from the left leg wound.  There is a very thick layer of Santyl with the development of robust granular and hypergranular tissue.  The wound was selectively debrided and then the hypergranulated tissue was chemically cauterized with silver nitrate.     HH will visit once weekly for Unna boot changes.    The right lower extremity continues to progress well but is still quite edematous.  We will continue applying Santyl and Mesalt again on this wound, under the Unna boot.     BEBE Estrada, has confirmed with [facility] that they have measured the right lower extremity for Farrow Wrap and it is expected in approximately 6-8 weeks.      Review of Systems   Musculoskeletal: Positive for gait problem.   Skin: Positive for wound.   All other systems reviewed and are negative.        Objective:      Vitals:    08/02/22 1200   BP: 133/63   Pulse: (!) 59   Resp: 20       Physical Exam  Vitals reviewed. Exam conducted with a chaperone present (Daughter).   Constitutional:       Appearance: She is obese.   HENT:      Head: Normocephalic.   Eyes:      Extraocular Movements: Extraocular movements intact.      Pupils: Pupils are equal, round, and reactive to  light.   Cardiovascular:      Rate and Rhythm: Normal rate.      Pulses: Normal pulses.   Pulmonary:      Effort: Pulmonary effort is normal.      Comments: SOB with exertion  Musculoskeletal:      Right lower leg: Edema present.      Left lower leg: Edema present.      Comments: Edema is improving.  Ambulation difficult due to knee pain.    Skin:     General: Skin is warm and dry.      Findings: Wound present.      Comments: Bilateral lower extremity wounds.  Callus to left 2nd toe.   Neurological:      General: No focal deficit present.      Mental Status: She is alert and oriented to person, place, and time.   Psychiatric:         Mood and Affect: Mood normal.            Wound 05/11/22 1157 Venous Ulcer Left lower;lateral Leg #1 (Active)   05/11/22 1157    Pre-existing:    Primary Wound Type: Venous ulcer   Side: Left   Orientation: lower;lateral   Location: Leg   Wound Number: #1   Ankle-Brachial Index:    Pulses:    Removal Indication and Assessment:    Wound Outcome:    (Retired) Wound Type:    (Retired) Wound Length (cm):    (Retired) Wound Width (cm):    (Retired) Depth (cm):    Wound Description (Comments):    Removal Indications:    Dressing Appearance Moist drainage 08/02/22 1153   Drainage Amount Moderate 08/02/22 1153   Drainage Characteristics/Odor Serosanguineous 08/02/22 1153   Appearance Slough;Moist 08/02/22 1153   Tissue loss description Full thickness 08/02/22 1153   Periwound Area Intact 08/02/22 1153   Wound Edges Open 08/02/22 1153   Wound Length (cm) 7.5 cm 08/02/22 1153   Wound Width (cm) 8 cm 08/02/22 1153   Wound Depth (cm) 0.3 cm 08/02/22 1153   Wound Volume (cm^3) 18 cm^3 08/02/22 1153   Wound Surface Area (cm^2) 60 cm^2 08/02/22 1153   Care Cleansed with:;Wound cleanser 08/02/22 1153   Dressing Applied 08/02/22 1153   Compression Unna's Boot;Two layer compression 08/02/22 1153   Dressing Change Due 08/05/22 08/02/22 1153            Wound 05/11/22 1157 Venous Ulcer Right lower;medial #2  "(Active)   05/11/22 1157    Pre-existing:    Primary Wound Type: Venous ulcer   Side: Right   Orientation: lower;medial   Location:    Wound Number: #2   Ankle-Brachial Index:    Pulses:    Removal Indication and Assessment:    Wound Outcome:    (Retired) Wound Type:    (Retired) Wound Length (cm):    (Retired) Wound Width (cm):    (Retired) Depth (cm):    Wound Description (Comments):    Removal Indications:    Dressing Appearance Moist drainage 08/02/22 1153   Drainage Amount Moderate 08/02/22 1153   Drainage Characteristics/Odor Serosanguineous 08/02/22 1153   Appearance Pink;Moist 08/02/22 1153   Tissue loss description Full thickness 08/02/22 1153   Periwound Area Intact;Dry 08/02/22 1153   Wound Edges Open 08/02/22 1153   Wound Length (cm) 0.8 cm 08/02/22 1153   Wound Width (cm) 2.2 cm 08/02/22 1153   Wound Depth (cm) 0.2 cm 08/02/22 1153   Wound Volume (cm^3) 0.352 cm^3 08/02/22 1153   Wound Surface Area (cm^2) 1.76 cm^2 08/02/22 1153   Care Cleansed with:;Wound cleanser 08/02/22 1153   Dressing Applied 08/02/22 1153   Compression Unna's Boot;Two layer compression 08/02/22 1153   Dressing Change Due 08/05/22 08/02/22 1153           Right lower leg        Left lower leg, pre     post    ML - Bilateral legs - santyl, silver alginate, ABD pad, zinc unna boot, kerlix, coban      Assessment:         ICD-10-CM ICD-9-CM   1. Non-pressure chronic ulcer of left lower leg with fat layer exposed  L97.922 707.10   2. Non-pressure chronic ulcer of right lower leg with fat layer exposed  L97.912 707.10   3. Idiopathic chronic venous hypertension of right lower extremity with ulcer  I87.311 459.31    L97.919    4. Idiopathic chronic venous hypertension of left lower extremity with ulcer  I87.312 459.31    L97.929            Procedures:     Debridement     Date/Time: 8/2/2022 11:40 AM  Performed by: Bonnie Portillo NP  Authorized by: Bonnie Portillo NP     Time out: Immediately prior to procedure a "time out" was called " to verify the correct patient, procedure, equipment, support staff and site/side marked as required.   Consent Done?:  Yes (Verbal)  Local anesthetic:  Lidocaine 2% without epinephrine     Wound Details:    Location:  Left leg    Type of Debridement:  Non-excisional       Length (cm):  7.5       Area (sq cm):  60       Width (cm):  8       Percent Debrided (%):  60       Depth (cm):  0.3       Total Area Debrided (sq cm):  36    Depth of debridement:  Epidermis/Dermis    Devitalized tissue debrided:  Biofilm, Exudate, Fibrin and Slough    Instruments:  Other ([facility])      Silver nitrate applied in treatment of hypergranulated tissue.  ML     Excisional debridement performed:          [] Yes [x] No   Selective debridement performed:           [x] Yes [] No   Mechanical debridement performed:        [] Yes [x] No   Silver nitrate applied:                                  [x] Yes [] No   Labs ordered this visit:                                [] Yes [x] No   Imaging ordered this visit:                          [] Yes [x] No   Tissue pathology and/or culture taken:    [x] Yes [] No  DNA Cx obtained   MEDICATIONS    Current Outpatient Medications:     amLODIPine (NORVASC) 10 MG tablet, , Disp: , Rfl:     amLODIPine (NORVASC) 2.5 MG tablet, Take 2.5 mg by mouth once daily., Disp: , Rfl:     carvediloL (COREG) 12.5 MG tablet, Take 12.5 mg by mouth 2 (two) times daily., Disp: , Rfl:     clopidogreL (PLAVIX) 75 mg tablet, Take 75 mg by mouth once daily., Disp: , Rfl:     FLUoxetine 20 MG capsule, Take 20 mg by mouth once daily., Disp: , Rfl:     furosemide (LASIX) 40 MG tablet, TAKE 1 TABLET BY MOUTH TWICE DAILY IN THE MORNING AND AT LUNCH, Disp: , Rfl:     pantoprazole (PROTONIX) 40 MG tablet, Take 40 mg by mouth once daily at 6am., Disp: , Rfl:     SANTYL ointment, Apply 1 application topically once daily at 6am., Disp: , Rfl:     spironolactone (ALDACTONE) 25 MG tablet, Take 12.5 mg by mouth once daily., Disp: ,  Rfl:     spironolactone-hydrochlorothiazide 25-25mg (ALDACTAZIDE) 25-25 mg Tab, , Disp: , Rfl:     valsartan (DIOVAN) 160 MG tablet, Take 160 mg by mouth 2 (two) times daily., Disp: , Rfl:  Review of patient's allergies indicates:  No Known Allergies      HOME HEALTH AGENCY:  [facility]  TIMES PER WEEK/DAYS:  1 x weekly, Friday  WOUND CARE ORDERS:  Left lateral lower leg: Cleanse with saline and apply santyl (nickel thick) to the wound bed and cover with calcium alginate. Reapply zinc unna boot, kerlix and coban. - to be changed by HH on Friday and patient to return to  on Tuesday.   Right medial lower leg: Cleanse with saline and apply santyl (nickel thick) to the wound bed and cover with calcium alginate. Reapply zinc unna boot, kerlix and coban - to be changed by HH on Friday and patient to return to  on Tuesday.    Follow up in about 1 week (around 8/9/2022) for Venous stasis - bilateral lower legs.        Electronically signed:  Bonnie Portillo NP

## 2022-08-23 ENCOUNTER — HOSPITAL ENCOUNTER (OUTPATIENT)
Dept: WOUND CARE | Facility: HOSPITAL | Age: 82
Discharge: HOME | End: 2022-08-23
Attending: NURSE PRACTITIONER
Payer: MEDICARE

## 2022-08-23 VITALS
HEART RATE: 58 BPM | SYSTOLIC BLOOD PRESSURE: 142 MMHG | HEIGHT: 72 IN | WEIGHT: 260 LBS | BODY MASS INDEX: 35.21 KG/M2 | RESPIRATION RATE: 18 BRPM | DIASTOLIC BLOOD PRESSURE: 65 MMHG | TEMPERATURE: 98 F

## 2022-08-23 DIAGNOSIS — L97.912 NON-PRESSURE CHRONIC ULCER OF RIGHT LOWER LEG WITH FAT LAYER EXPOSED: ICD-10-CM

## 2022-08-23 DIAGNOSIS — I87.312 IDIOPATHIC CHRONIC VENOUS HYPERTENSION OF LEFT LOWER EXTREMITY WITH ULCER: ICD-10-CM

## 2022-08-23 DIAGNOSIS — L97.919 IDIOPATHIC CHRONIC VENOUS HYPERTENSION OF RIGHT LOWER EXTREMITY WITH ULCER: ICD-10-CM

## 2022-08-23 DIAGNOSIS — L97.922 NON-PRESSURE CHRONIC ULCER OF LEFT LOWER LEG WITH FAT LAYER EXPOSED: Primary | ICD-10-CM

## 2022-08-23 DIAGNOSIS — L97.929 IDIOPATHIC CHRONIC VENOUS HYPERTENSION OF LEFT LOWER EXTREMITY WITH ULCER: ICD-10-CM

## 2022-08-23 DIAGNOSIS — I87.311 IDIOPATHIC CHRONIC VENOUS HYPERTENSION OF RIGHT LOWER EXTREMITY WITH ULCER: ICD-10-CM

## 2022-08-23 PROCEDURE — 99213 OFFICE O/P EST LOW 20 MIN: CPT | Mod: 25

## 2022-08-23 PROCEDURE — 97597 DBRDMT OPN WND 1ST 20 CM/<: CPT

## 2022-08-23 PROCEDURE — 27000999 HC MEDICAL RECORD PHOTO DOCUMENTATION

## 2022-08-23 RX ORDER — AMOXICILLIN 500 MG/1
500 CAPSULE ORAL 3 TIMES DAILY
COMMUNITY
End: 2022-09-14 | Stop reason: ALTCHOICE

## 2022-08-23 NOTE — PROGRESS NOTES
Subjective:       Patient ID: Jil Graves is a 82 y.o. female.    Chief Complaint: Venous Stasis (Venous Stasis (Right medial lower leg /Left lateral lower leg ))    HPI     Ms. Graves returns today for follow up for bilateral lower extremity venous ulcers.    She was admitted to  [facility] last Friday (8/19/22) and remains inpatient.  Orders were sent for daily dressing changes.  However, she reports that they have changed the dressing twice since Friday.  Orders will be resent to ensure compliance with her abx regimen.  She also reports that she is currently taking Amoxicillin for a bladder infection.    Of note, there are now several small scattered wounds on the left lower leg which appear to be skin tears or tears from bandages.  She was noted to have a dressing in place including kerlix and coban to the mid calf only, rather than to below the knee as required.  This order will be resent as well stipulating dressing requirements     The wound on the left lower extremity is filling in.  There was a layer of abx residue which was removed.  She does continue to use Santyl on this wound and daily dressing changes would be beneficial to the wound while she is in rehab.    The right lower extremity continues to progress well but is still quite edematous.  We will continue applying Santyl and Adaptic on this wound, under the wraps.    According to BEBE Estrada,  they have measured the right lower extremity for Farrow Wrap and it should be arriving soon.      Review of Systems   Musculoskeletal: Positive for gait problem.   Skin: Positive for wound.   All other systems reviewed and are negative.        Objective:      Vitals:    08/23/22 1205   BP: (!) 142/65   Pulse: (!) 58   Resp: 18   Temp: 98.4 °F (36.9 °C)       Physical Exam  Vitals reviewed. Exam conducted with a chaperone present (Daughter).   Constitutional:       Appearance: She is obese.   HENT:      Head: Normocephalic.   Eyes:       Extraocular Movements: Extraocular movements intact.      Pupils: Pupils are equal, round, and reactive to light.   Cardiovascular:      Rate and Rhythm: Normal rate.      Pulses: Normal pulses.   Pulmonary:      Effort: Pulmonary effort is normal.      Comments: SOB with exertion  Musculoskeletal:      Right lower leg: Edema present.      Left lower leg: Edema present.      Comments: Edema is improving.  Ambulation difficult due to knee pain.    Skin:     General: Skin is warm and dry.      Findings: Wound present.      Comments: Bilateral lower extremity wounds.  Callus to left 2nd toe.   Neurological:      General: No focal deficit present.      Mental Status: She is alert and oriented to person, place, and time.   Psychiatric:         Mood and Affect: Mood normal.            Wound 05/11/22 1157 Venous Ulcer Left lower;lateral Leg #1 (Active)   05/11/22 1157    Pre-existing:    Primary Wound Type: Venous ulcer   Side: Left   Orientation: lower;lateral   Location: Leg   Wound Number: #1   Ankle-Brachial Index:    Pulses:    Removal Indication and Assessment:    Wound Outcome:    (Retired) Wound Type:    (Retired) Wound Length (cm):    (Retired) Wound Width (cm):    (Retired) Depth (cm):    Wound Description (Comments):    Removal Indications:    Dressing Appearance Intact 08/23/22 1227   Drainage Amount Moderate 08/23/22 1227   Drainage Characteristics/Odor Clear;Serous 08/23/22 1227   Appearance Hypergranulation;Slough 08/23/22 1227   Tissue loss description Full thickness 08/23/22 1227   Periwound Area Intact;Edematous 08/23/22 1227   Wound Edges Defined 08/23/22 1227   Wound Length (cm) 7.6 cm 08/23/22 1227   Wound Width (cm) 6.5 cm 08/23/22 1227   Wound Depth (cm) 0.5 cm 08/23/22 1227   Wound Volume (cm^3) 24.7 cm^3 08/23/22 1227   Wound Surface Area (cm^2) 49.4 cm^2 08/23/22 1227            Wound 05/11/22 1157 Venous Ulcer Right lower;medial #2 (Active)   05/11/22 1157    Pre-existing:    Primary Wound Type:  Venous ulcer   Side: Right   Orientation: lower;medial   Location:    Wound Number: #2   Ankle-Brachial Index:    Pulses:    Removal Indication and Assessment:    Wound Outcome:    (Retired) Wound Type:    (Retired) Wound Length (cm):    (Retired) Wound Width (cm):    (Retired) Depth (cm):    Wound Description (Comments):    Removal Indications:    Dressing Appearance Intact 08/23/22 1227   Drainage Amount Moderate 08/23/22 1227   Drainage Characteristics/Odor Clear;Serous 08/23/22 1227   Appearance Granulating 08/23/22 1227   Periwound Area Intact 08/23/22 1227   Wound Edges Defined 08/23/22 1227   Wound Length (cm) 1.4 cm 08/23/22 1227   Wound Width (cm) 1.2 cm 08/23/22 1227   Wound Depth (cm) 0.2 cm 08/23/22 1227   Wound Volume (cm^3) 0.336 cm^3 08/23/22 1227   Wound Surface Area (cm^2) 1.68 cm^2 08/23/22 1227            Wound 08/23/22 1229 Venous Ulcer lower;medial #3 (Active)   08/23/22 1229    Pre-existing: Yes   Primary Wound Type: Venous ulcer   Side:    Orientation: lower;medial   Location:    Wound Number: #3   Ankle-Brachial Index:    Pulses:    Removal Indication and Assessment:    Wound Outcome:    (Retired) Wound Type:    (Retired) Wound Length (cm):    (Retired) Wound Width (cm):    (Retired) Depth (cm):    Wound Description (Comments):    Removal Indications:    Dressing Appearance Intact 08/23/22 1227   Drainage Amount Moderate 08/23/22 1227   Drainage Characteristics/Odor Clear;Serous 08/23/22 1227   Appearance Granulating;Slough 08/23/22 1227   Periwound Area Edematous 08/23/22 1227   Wound Edges Irregular 08/23/22 1227   Wound Length (cm) 6 cm 08/23/22 1227   Wound Width (cm) 1.5 cm 08/23/22 1227   Wound Depth (cm) 0.2 cm 08/23/22 1227   Wound Volume (cm^3) 1.8 cm^3 08/23/22 1227   Wound Surface Area (cm^2) 9 cm^2 08/23/22 1227            Wound 08/23/22 1231 Venous Ulcer posterior Calf #4 (Active)   08/23/22 1231    Pre-existing: Yes   Primary Wound Type: Venous ulcer   Side:    Orientation:  "posterior   Location: Calf   Wound Number: #4   Ankle-Brachial Index:    Pulses:    Removal Indication and Assessment:    Wound Outcome:    (Retired) Wound Type:    (Retired) Wound Length (cm):    (Retired) Wound Width (cm):    (Retired) Depth (cm):    Wound Description (Comments):    Removal Indications:    Dressing Appearance Intact 08/23/22 1227   Drainage Amount Small 08/23/22 1227   Drainage Characteristics/Odor Clear;Serous 08/23/22 1227   Appearance Slough;Fibrin 08/23/22 1227   Periwound Area Edematous 08/23/22 1227   Wound Length (cm) 0.6 cm 08/23/22 1227   Wound Width (cm) 3 cm 08/23/22 1227   Wound Depth (cm) 0.2 cm 08/23/22 1227   Wound Volume (cm^3) 0.36 cm^3 08/23/22 1227   Wound Surface Area (cm^2) 1.8 cm^2 08/23/22 1227           Right lower leg   Santyl, adaptic, gazue, kerlix, coban         Left lower leg, pre      Post  Topical abx, santyl, adaptic, gauze, kerlix, coban           Left medial leg (new)      Left posterior leg (new)  Adaptic, gauze,kerlix, coban     Adaptic, gauze,kerlix, coban    LR         Assessment:         ICD-10-CM ICD-9-CM   1. Non-pressure chronic ulcer of left lower leg with fat layer exposed  L97.922 707.10   2. Non-pressure chronic ulcer of right lower leg with fat layer exposed  L97.912 707.10   3. Idiopathic chronic venous hypertension of right lower extremity with ulcer  I87.311 459.31    L97.919    4. Idiopathic chronic venous hypertension of left lower extremity with ulcer  I87.312 459.31    L97.929            Procedures:     Debridement     Date/Time: 8/23/2022 11:40 AM  Performed by: Bonnie Portillo NP  Authorized by: Bonnie Portillo NP     Time out: Immediately prior to procedure a "time out" was called to verify the correct patient, procedure, equipment, support staff and site/side marked as required.   Consent Done?:  Yes (Verbal)  Local anesthesia used?: No       Wound Details:    Location:  Left leg (Lateral)    Type of Debridement:  Non-excisional       " Length (cm):  7.6       Area (sq cm):  49.4       Width (cm):  6.5       Percent Debrided (%):  40       Depth (cm):  0.5       Total Area Debrided (sq cm):  19.76    Depth of debridement:  Epidermis/Dermis    Devitalized tissue debrided:  Biofilm, Exudate, Fibrin and Slough (Antibiotic residue)    Instruments:  Other ([place])      LR     Excisional debridement performed:          [] Yes [x] No   Selective debridement performed:           [x] Yes [] No   Mechanical debridement performed:        [] Yes [x] No   Silver nitrate applied:                                  [] Yes [x] No   Labs ordered this visit:                                [] Yes [x] No   Imaging ordered this visit:                          [] Yes [x] No   Tissue pathology and/or culture taken:    [] Yes [x] No  Topical abx being used    MEDICATIONS    Current Outpatient Medications:     amLODIPine (NORVASC) 10 MG tablet, , Disp: , Rfl:     amLODIPine (NORVASC) 2.5 MG tablet, Take 2.5 mg by mouth once daily., Disp: , Rfl:     amoxicillin (AMOXIL) 500 MG capsule, Take 500 mg by mouth 3 (three) times daily., Disp: , Rfl:     carvediloL (COREG) 12.5 MG tablet, Take 12.5 mg by mouth 2 (two) times daily., Disp: , Rfl:     clopidogreL (PLAVIX) 75 mg tablet, Take 75 mg by mouth once daily., Disp: , Rfl:     FLUoxetine 20 MG capsule, Take 20 mg by mouth once daily., Disp: , Rfl:     furosemide (LASIX) 40 MG tablet, TAKE 1 TABLET BY MOUTH TWICE DAILY IN THE MORNING AND AT LUNCH, Disp: , Rfl:     pantoprazole (PROTONIX) 40 MG tablet, Take 40 mg by mouth once daily at 6am., Disp: , Rfl:     SANTYL ointment, Apply 1 application topically once daily at 6am., Disp: , Rfl:     spironolactone (ALDACTONE) 25 MG tablet, Take 12.5 mg by mouth once daily., Disp: , Rfl:     spironolactone-hydrochlorothiazide 25-25mg (ALDACTAZIDE) 25-25 mg Tab, , Disp: , Rfl:     TOPICAL C[employer]ILDER, OUTPATIENT,, APPLY 2 GRAMS TO INFECTION SITE. PERFORM 1-2 TIMES  DAILY, Disp: , Rfl:     valsartan (DIOVAN) 160 MG tablet, Take 160 mg by mouth 2 (two) times daily., Disp: , Rfl:  Review of patient's allergies indicates:  No Known Allergies      HOME HEALTH AGENCY:  [facility]  TIMES PER WEEK/DAYS:  1 x weekly, Friday  WOUND CARE ORDERS:  Left lateral lower leg-Cleanse wound bed with saline. Apply topical antibiotics & Santyl nickel deep to wound bed cover with Adaptic, gauze. Wrap with Kerlix & coban, starting at base of toes up to 2 inch below knee, Change daily  Left posterior & Left medial lower leg-Cleanse wound bed with saline. Apply adaptic to wound bed, cover with gauze, Wrap with Kerlix & Coban as instructed above.Change daily.  Right medial lower leg-Cleanse with wound cleanser. Apply Santyl nickel deep to wound bed, cover with adaptic, apply gauze, wrap with Kerlix & Coban, starting at base of toes up to 2 inch below knee. Change daily.      Follow up in about 2 weeks (around 9/6/2022).        Electronically signed:  Bonnie Portillo NP

## 2022-09-14 ENCOUNTER — HOSPITAL ENCOUNTER (OUTPATIENT)
Dept: WOUND CARE | Facility: HOSPITAL | Age: 82
Discharge: HOME | End: 2022-09-14
Attending: NURSE PRACTITIONER
Payer: MEDICARE

## 2022-09-14 VITALS
SYSTOLIC BLOOD PRESSURE: 111 MMHG | RESPIRATION RATE: 18 BRPM | HEIGHT: 72 IN | WEIGHT: 260 LBS | BODY MASS INDEX: 35.21 KG/M2 | DIASTOLIC BLOOD PRESSURE: 52 MMHG | HEART RATE: 55 BPM

## 2022-09-14 DIAGNOSIS — L97.121 NON-PRESSURE CHRONIC ULCER OF LEFT THIGH LIMITED TO BREAKDOWN OF SKIN: ICD-10-CM

## 2022-09-14 DIAGNOSIS — I87.311 IDIOPATHIC CHRONIC VENOUS HYPERTENSION OF RIGHT LOWER EXTREMITY WITH ULCER: ICD-10-CM

## 2022-09-14 DIAGNOSIS — L97.912 NON-PRESSURE CHRONIC ULCER OF RIGHT LOWER LEG WITH FAT LAYER EXPOSED: ICD-10-CM

## 2022-09-14 DIAGNOSIS — L97.922 NON-PRESSURE CHRONIC ULCER OF LEFT LOWER LEG WITH FAT LAYER EXPOSED: Primary | ICD-10-CM

## 2022-09-14 DIAGNOSIS — L97.919 IDIOPATHIC CHRONIC VENOUS HYPERTENSION OF RIGHT LOWER EXTREMITY WITH ULCER: ICD-10-CM

## 2022-09-14 DIAGNOSIS — I87.312 IDIOPATHIC CHRONIC VENOUS HYPERTENSION OF LEFT LOWER EXTREMITY WITH ULCER: ICD-10-CM

## 2022-09-14 DIAGNOSIS — L97.929 IDIOPATHIC CHRONIC VENOUS HYPERTENSION OF LEFT LOWER EXTREMITY WITH ULCER: ICD-10-CM

## 2022-09-14 PROCEDURE — 99213 OFFICE O/P EST LOW 20 MIN: CPT | Mod: 25

## 2022-09-14 PROCEDURE — 27000999 HC MEDICAL RECORD PHOTO DOCUMENTATION

## 2022-09-14 PROCEDURE — 11042 DBRDMT SUBQ TIS 1ST 20SQCM/<: CPT

## 2022-09-14 RX ORDER — CALCITRIOL 0.5 UG/1
1 CAPSULE ORAL 2 TIMES DAILY
COMMUNITY
Start: 2022-09-08

## 2022-09-14 RX ORDER — ONDANSETRON 4 MG/1
4 TABLET, FILM COATED ORAL EVERY 8 HOURS PRN
COMMUNITY

## 2022-09-14 RX ORDER — SODIUM BICARBONATE 650 MG/1
650 TABLET ORAL 2 TIMES DAILY
COMMUNITY
Start: 2022-09-07

## 2022-09-14 RX ORDER — FUROSEMIDE 20 MG/1
60 TABLET ORAL DAILY
COMMUNITY
Start: 2022-09-08

## 2022-09-14 RX ORDER — PATIROMER 8.4 G/1
1 POWDER, FOR SUSPENSION ORAL
COMMUNITY
Start: 2022-09-08

## 2022-09-14 RX ORDER — MIDODRINE HYDROCHLORIDE 5 MG/1
5 TABLET ORAL
COMMUNITY

## 2022-09-14 RX ORDER — TRAMADOL HYDROCHLORIDE 50 MG/1
50 TABLET ORAL EVERY 6 HOURS PRN
COMMUNITY
Start: 2022-09-07

## 2022-09-14 NOTE — PROGRESS NOTES
Subjective:       Patient ID: Jil Graves is a 82 y.o. female.    Chief Complaint: Venous Ulcer (Right medial lower leg/Left lateral lower leg/Left lower medial leg/Left posterior calf) and Wound Care (Left posterior thigh)    HPI     DEBRIDEMENT:  Left lower lateral leg- freer (slough      NOTES:  D/C topicals  Left 2nd toe- Debrided  with dermal    Ms. Graves returns today for follow up for bilateral lower extremity venous ulcers.    She was admitted to  [facility] on 8/19/22 and was discharged to home yesterday.    There were several small scattered wounds on the left lower leg at her last visit which appeared to be skin tears or tears from bandages.  The majority of those wounds are closed now with the exception of a new area of excoriation at the left mid thigh.       The wound on the left lower extremity is filling in.  There was a layer of abx residue again this week which was removed.  She does continue to use Santyl on this wound and daily dressing changes were beneficial to the wound while she was in rehab.  Now that she is home and no longer receiving daily wound care we will attempt authorization for another round of theraskin grafts.  Her last grafts were in mid October, 2021.    The right lower extremity is nearly healed at this point.    Of note, home health had called to relay their concern regarding the left second toe that they reported to be black.  This toe was assessed and devitalized tissue removed with a dermal curette.  It appears as though a blood blister had developed at some point.  This blister was dry, with no open wound.  The dried blister was removed, along with callus.  The patient will wear a bandaid over the area to protect.  No further treatment is necessary at this time.     Review of Systems   Musculoskeletal:  Positive for gait problem.   Skin:  Positive for wound.   All other systems reviewed and are negative.      Objective:      Vitals:    09/14/22 1155    BP: (!) 111/52   Pulse: (!) 55   Resp: 18       Physical Exam  Vitals reviewed. Exam conducted with a chaperone present (Daughter).   Constitutional:       Appearance: She is obese.   HENT:      Head: Normocephalic.   Eyes:      Extraocular Movements: Extraocular movements intact.      Pupils: Pupils are equal, round, and reactive to light.   Cardiovascular:      Rate and Rhythm: Normal rate.      Pulses: Normal pulses.   Pulmonary:      Effort: Pulmonary effort is normal.      Comments: SOB with exertion  Musculoskeletal:      Right lower leg: Edema present.      Left lower leg: Edema present.      Comments: Edema is improving.  Ambulation difficult due to knee pain.    Skin:     General: Skin is warm and dry.      Findings: Wound present.      Comments: Bilateral lower extremity wounds.  Callus to left 2nd toe.   Neurological:      General: No focal deficit present.      Mental Status: She is alert and oriented to person, place, and time.   Psychiatric:         Mood and Affect: Mood normal.          Altered Skin Integrity 09/14/22 1205 Left posterior Thigh #5 Skin Tear Partial thickness tissue loss. Shallow open ulcer with a red or pink wound bed, without slough. Intact or Open/Ruptured Serum-filled blister. (Active)   09/14/22 1205   Altered Skin Integrity Present on Admission: yes   Side: Left   Orientation: posterior   Location: Thigh   Wound Number: #5   Is this injury device related?: Yes   Primary Wound Type: Skin tear   Description of Altered Skin Integrity: Partial thickness tissue loss. Shallow open ulcer with a red or pink wound bed, without slough. Intact or Open/Ruptured Serum-filled blister.   Ankle-Brachial Index:    Pulses:    Removal Indication and Assessment:    Wound Outcome:    (Retired) Wound Length (cm):    (Retired) Wound Width (cm):    (Retired) Depth (cm):    Wound Description (Comments):    Removal Indications:    Description of Altered Skin Integrity Partial thickness tissue loss.  Shallow open ulcer with a red or pink wound bed, without slough. Intact or Open/Ruptured Serum-filled blister. 09/14/22 1157   Drainage Amount Moderate 09/14/22 1157   Tissue loss description Partial thickness 09/14/22 1157   Wound Edges Defined 09/14/22 1157   Wound Length (cm) 6 cm 09/14/22 1157   Wound Width (cm) 2.4 cm 09/14/22 1157   Wound Depth (cm) 0.2 cm 09/14/22 1157   Wound Volume (cm^3) 2.88 cm^3 09/14/22 1157   Wound Surface Area (cm^2) 14.4 cm^2 09/14/22 1157   Dressing Change Due 09/16/22 09/14/22 1157            Wound 05/11/22 1157 Venous Ulcer Left lower;lateral Leg #1 (Active)   05/11/22 1157    Pre-existing:    Primary Wound Type: Venous ulcer   Side: Left   Orientation: lower;lateral   Location: Leg   Wound Number: #1   Ankle-Brachial Index:    Pulses:    Removal Indication and Assessment:    Wound Outcome:    (Retired) Wound Type:    (Retired) Wound Length (cm):    (Retired) Wound Width (cm):    (Retired) Depth (cm):    Wound Description (Comments):    Removal Indications:    Drainage Amount Moderate 09/14/22 1207   Drainage Characteristics/Odor Bleeding controlled;Serosanguineous 09/14/22 1207   Appearance Intact;Red;Slough;Moist;Bleeding;Hypergranulation 09/14/22 1207   Tissue loss description Full thickness 09/14/22 1207   Periwound Area Intact 09/14/22 1207   Wound Edges Defined;Open 09/14/22 1207   Wound Length (cm) 7.5 cm 09/14/22 1207   Wound Width (cm) 6.5 cm 09/14/22 1207   Wound Depth (cm) 0.4 cm 09/14/22 1207   Wound Volume (cm^3) 19.5 cm^3 09/14/22 1207   Wound Surface Area (cm^2) 48.75 cm^2 09/14/22 1207   Care Cleansed with:;Other (see comments) 09/14/22 1207   Dressing Applied 09/14/22 1207   Dressing Change Due 09/16/22 09/14/22 1207            (Retired) Wound Venous Ulcer Right lower;medial Leg #2 (Active)        Pre-existing: Yes   Primary Wound Type: Venous ulcer   Side: Right   Orientation: lower;medial   Location: Leg   Wound Number: #2   Ankle-Brachial Index:    Pulses:   "  Removal Indication and Assessment:    Wound Outcome:    (Retired) Wound Type:    (Retired) Wound Length (cm):    (Retired) Wound Width (cm):    (Retired) Depth (cm):    Wound Description (Comments):    Removal Indications:    Dressing Appearance Intact;Moist drainage 09/14/22 1207   Drainage Amount Moderate 09/14/22 1207   Drainage Characteristics/Odor Serosanguineous 09/14/22 1207   Appearance Intact;Pink 09/14/22 1207   Tissue loss description Full thickness 09/14/22 1207   Wound Edges Defined 09/14/22 1207   Wound Length (cm) 1 cm 09/14/22 1207   Wound Width (cm) 1.3 cm 09/14/22 1207   Wound Depth (cm) 0.2 cm 09/14/22 1207   Wound Volume (cm^3) 0.26 cm^3 09/14/22 1207   Wound Surface Area (cm^2) 1.3 cm^2 09/14/22 1207   Dressing Applied 09/14/22 1207   Dressing Change Due 09/16/22 09/14/22 1207           Right lower leg   silver alginate, gentle border, kerlix, coban           Left lower leg, pre      No Post  photo  Santyl, mesalt, calcium alginate, ABD pad, kerlix, coban          Left medial leg       Left posterior leg   Healed        Healed      Left posterior thigh - excoriation  mesalt, gentle border        Left 2nd toe, pre     Post  (no wound - bandaid only_  LR         Assessment:         ICD-10-CM ICD-9-CM   1. Non-pressure chronic ulcer of left lower leg with fat layer exposed  L97.922 707.10   2. Non-pressure chronic ulcer of right lower leg with fat layer exposed  L97.912 707.10   3. 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 Idiopathic chronic venous hypertension of right lower extremity with ulcer  I87.311 459.31    L97.919    5. Idiopathic chronic venous hypertension of left lower extremity with ulcer  I87.312 459.31    L97.929            Procedures:     Debridement     Date/Time: 9/14/2022 10:40 AM  Performed by: Bonnie Portillo NP  Authorized by: Bonnie Portillo NP      Time out: Immediately prior to procedure a "time out" was called to " verify the correct patient, procedure, equipment, support staff and site/side marked as required.     Consent Done?:  Yes (Verbal)  Local anesthesia used?: Yes    Local anesthetic:  Lidocaine 2% topical gel     Wound Details:    Location:  Left leg    Type of Debridement:  Non-excisional       Length (cm):  7.5       Area (sq cm):  48.8       Width (cm):  6.5       Percent Debrided (%):  100       Depth (cm):  0.4       Total Area Debrided (sq cm):  48.8    Depth of debridement:  Epidermis/Dermis    Devitalized tissue debrided:  Biofilm, Exudate, Fibrin and Slough    Instruments:  Other ([place])      TR     Excisional debridement performed:          [] Yes [x] No   Selective debridement performed:           [x] Yes [] No   Mechanical debridement performed:        [] Yes [x] No   Silver nitrate applied:                                  [] Yes [x] No   Labs ordered this visit:                                [] Yes [x] No   Imaging ordered this visit:                          [] Yes [x] No   Tissue pathology and/or culture taken:    [] Yes [x] No  Topical abx discontinued this visit.    MEDICATIONS    Current Outpatient Medications:     acetaminophen (TYLENOL) suppository, Place 325 mg rectally every 4 (four) hours as needed for Temperature greater than., Disp: , Rfl:     calcitRIOL (ROCALTROL) 0.5 MCG Cap, Take 1 capsule by mouth 2 (two) times a day., Disp: , Rfl:     carvediloL (COREG) 12.5 MG tablet, Take 12.5 mg by mouth 2 (two) times daily., Disp: , Rfl:     clopidogreL (PLAVIX) 75 mg tablet, Take 75 mg by mouth once daily., Disp: , Rfl:     FLUoxetine 20 MG capsule, Take 20 mg by mouth once daily., Disp: , Rfl:     furosemide (LASIX) 20 MG tablet, Take 60 mg by mouth once daily., Disp: , Rfl:     midodrine (PROAMATINE) 5 MG Tab, Take 5 mg by mouth. Give 2 tabs (10mg) po TID PRN for BP <110, Disp: , Rfl:     ondansetron (ZOFRAN) 4 MG tablet, Take 4 mg by mouth every 8 (eight) hours as needed for Nausea., Disp: ,  Rfl:     pantoprazole (PROTONIX) 40 MG tablet, Take 40 mg by mouth once daily at 6am., Disp: , Rfl:     SANTYL ointment, Apply 1 application topically once daily at 6am., Disp: , Rfl:     sodium bicarbonate 650 MG tablet, Take 650 mg by mouth 2 (two) times daily., Disp: , Rfl:     TOPICAL CUSTOM COMPOUND BUILDER, OUTPATIENT,, APPLY 2 GRAMS TO INFECTION SITE. PERFORM 1-2 TIMES DAILY, Disp: , Rfl:     traMADoL (ULTRAM) 50 mg tablet, Take 50 mg by mouth every 6 (six) hours as needed., Disp: , Rfl:     valsartan (DIOVAN) 160 MG tablet, Take 160 mg by mouth 2 (two) times daily., Disp: , Rfl:     VELTASSA 8.4 gram PwPk, Take 1 packet by mouth., Disp: , Rfl:  Review of patient's allergies indicates:  No Known Allergies      HOME HEALTH AGENCY:  [facility]  TIMES PER WEEK/DAYS:  1 x weekly, Friday  WOUND CARE ORDERS:    TOPICAL ABX DISCONTINUED  Left lateral lower leg-Cleanse wound bed with saline. Apply Santyl nickel thick to wound bed cover with mesalt and calcium alginate. Wrap with Kerlix & coban, starting at base of toes up to 2 inch below knee, to be changed on Monday, Wednesday and Friday.   Left posterior & Left medial lower leg- Healed  Right medial lower leg- Cleanse with wound cleanser. Apply silver alginate to wound and cover with gauze, wrap with Kerlix & Coban, starting at base of toes up to 2 inch below knee, to be changed on Monday, Wednesday and Friday.   Left posterior thigh- Mesalt and cover with a gentle border, to be changed on Monday, Wednesday and Friday.       Follow up in about 1 week (around 9/21/2022) for stretcher.        Electronically signed:  Bonnie Portillo NP

## 2022-09-21 ENCOUNTER — HOSPITAL ENCOUNTER (OUTPATIENT)
Dept: WOUND CARE | Facility: HOSPITAL | Age: 82
Discharge: HOME | End: 2022-09-21
Attending: NURSE PRACTITIONER
Payer: MEDICARE

## 2022-09-21 VITALS
WEIGHT: 260 LBS | HEART RATE: 56 BPM | HEIGHT: 72 IN | SYSTOLIC BLOOD PRESSURE: 116 MMHG | RESPIRATION RATE: 18 BRPM | BODY MASS INDEX: 35.21 KG/M2 | DIASTOLIC BLOOD PRESSURE: 52 MMHG

## 2022-09-21 DIAGNOSIS — I87.311 IDIOPATHIC CHRONIC VENOUS HYPERTENSION OF RIGHT LOWER EXTREMITY WITH ULCER: ICD-10-CM

## 2022-09-21 DIAGNOSIS — L97.929 IDIOPATHIC CHRONIC VENOUS HYPERTENSION OF LEFT LOWER EXTREMITY WITH ULCER: ICD-10-CM

## 2022-09-21 DIAGNOSIS — I87.312 IDIOPATHIC CHRONIC VENOUS HYPERTENSION OF LEFT LOWER EXTREMITY WITH ULCER: ICD-10-CM

## 2022-09-21 DIAGNOSIS — L97.121 NON-PRESSURE CHRONIC ULCER OF LEFT THIGH LIMITED TO BREAKDOWN OF SKIN: ICD-10-CM

## 2022-09-21 DIAGNOSIS — L97.919 IDIOPATHIC CHRONIC VENOUS HYPERTENSION OF RIGHT LOWER EXTREMITY WITH ULCER: ICD-10-CM

## 2022-09-21 DIAGNOSIS — L97.922 NON-PRESSURE CHRONIC ULCER OF LEFT LOWER LEG WITH FAT LAYER EXPOSED: Primary | ICD-10-CM

## 2022-09-21 DIAGNOSIS — L97.912 NON-PRESSURE CHRONIC ULCER OF RIGHT LOWER LEG WITH FAT LAYER EXPOSED: ICD-10-CM

## 2022-09-21 PROCEDURE — 99213 OFFICE O/P EST LOW 20 MIN: CPT | Mod: 25

## 2022-09-21 PROCEDURE — 97597 DBRDMT OPN WND 1ST 20 CM/<: CPT

## 2022-09-21 PROCEDURE — 27000999 HC MEDICAL RECORD PHOTO DOCUMENTATION

## 2022-09-22 NOTE — PROCEDURES
"Debridement    Date/Time: 9/21/2022 1:34 PM  Performed by: Bonnie Portillo NP  Authorized by: Bonnie Portillo NP     Time out: Immediately prior to procedure a "time out" was called to verify the correct patient, procedure, equipment, support staff and site/side marked as required.    Consent Done?:  Yes (Verbal)  Local anesthesia used?: No      Wound Details:    Location:  Left leg    Type of Debridement:  Non-excisional       Length (cm):  7.5       Area (sq cm):  56.25       Width (cm):  7.5       Percent Debrided (%):  100       Depth (cm):  0.4       Total Area Debrided (sq cm):  56.25    Depth of debridement:  Epidermis/Dermis    Devitalized tissue debrided:  Biofilm, Exudate, Fibrin and Slough    Instruments:  Ultrasound Probe ([facility])     ML  "

## 2022-09-22 NOTE — PROGRESS NOTES
Subjective:       Patient ID: Jil Graves is a 82 y.o. female.    Chief Complaint: Venous Ulcer (Left lateral lower leg/Right medial lower leg ) and Wound Care (Left posterior thigh )    HPI     Ms. Graves returns today for follow up for bilateral lower extremity venous ulcers.     The wound on the left lower extremity continues filling in.   She does continue to use Santyl on this wound and daily dressing changes were beneficial to the wound while she was in rehab.  Now that she is home and no longer receiving daily wound care we will attempt authorization for another round of theraskin grafts.  Her last grafts were in mid October, 2021.  We will plan to begin grafting again once approved.  Today this wound was ultrasonically debrided.  Lidocaine was not used and she was able to tolerate well.    The right lower extremity is nearly healed at this point.  We will continue to place alginate and a dressing.    She does have the wound on the back of the left thigh that we are continuing to monitor as it is pressure and being affected by her wheelchair and transfers.    Review of Systems   Musculoskeletal:  Positive for gait problem.   Skin:  Positive for wound.   All other systems reviewed and are negative.      Objective:      Vitals:    09/21/22 1412   BP: (!) 116/52   Pulse: (!) 56   Resp: 18       Physical Exam  Vitals reviewed. Exam conducted with a chaperone present (Daughter).   Constitutional:       Appearance: She is obese.   HENT:      Head: Normocephalic.   Eyes:      Extraocular Movements: Extraocular movements intact.      Pupils: Pupils are equal, round, and reactive to light.   Cardiovascular:      Rate and Rhythm: Normal rate.      Pulses: Normal pulses.   Pulmonary:      Effort: Pulmonary effort is normal.      Comments: SOB with exertion  Musculoskeletal:      Right lower leg: Edema present.      Left lower leg: Edema present.      Comments: Edema is improving.  Ambulation difficult due to knee  pain.    Skin:     General: Skin is warm and dry.      Findings: Wound present.      Comments: Bilateral lower extremity wounds.  Callus to left 2nd toe.   Neurological:      General: No focal deficit present.      Mental Status: She is alert and oriented to person, place, and time.   Psychiatric:         Mood and Affect: Mood normal.          Altered Skin Integrity 09/14/22 1205 Left posterior Thigh #5 Skin Tear Partial thickness tissue loss. Shallow open ulcer with a red or pink wound bed, without slough. Intact or Open/Ruptured Serum-filled blister. (Active)   09/14/22 1205   Altered Skin Integrity Present on Admission: yes   Side: Left   Orientation: posterior   Location: Thigh   Wound Number: #5   Is this injury device related?: Yes   Primary Wound Type: Skin tear   Description of Altered Skin Integrity: Partial thickness tissue loss. Shallow open ulcer with a red or pink wound bed, without slough. Intact or Open/Ruptured Serum-filled blister.   Ankle-Brachial Index:    Pulses:    Removal Indication and Assessment:    Wound Outcome:    (Retired) Wound Length (cm):    (Retired) Wound Width (cm):    (Retired) Depth (cm):    Wound Description (Comments):    Removal Indications:    Dressing Appearance Open to air 09/21/22 1408   Drainage Amount Small 09/21/22 1408   Drainage Characteristics/Odor Clear 09/21/22 1408   Appearance Pink 09/21/22 1408   Tissue loss description Full thickness 09/21/22 1408   Periwound Area Intact 09/21/22 1408   Wound Edges Open 09/21/22 1408   Wound Length (cm) 2 cm 09/21/22 1408   Wound Width (cm) 3.2 cm 09/21/22 1408   Wound Depth (cm) 0.2 cm 09/21/22 1408   Wound Volume (cm^3) 1.28 cm^3 09/21/22 1408   Wound Surface Area (cm^2) 6.4 cm^2 09/21/22 1408   Care Cleansed with:;Wound cleanser 09/21/22 1408   Dressing Applied 09/21/22 1408   Dressing Change Due 09/23/22 09/21/22 1408            Wound 05/11/22 1157 Venous Ulcer Left lower;lateral Leg #1 (Active)   05/11/22 1157     Pre-existing:    Primary Wound Type: Venous ulcer   Side: Left   Orientation: lower;lateral   Location: Leg   Wound Number: #1   Ankle-Brachial Index:    Pulses:    Removal Indication and Assessment:    Wound Outcome:    (Retired) Wound Type:    (Retired) Wound Length (cm):    (Retired) Wound Width (cm):    (Retired) Depth (cm):    Wound Description (Comments):    Removal Indications:    Dressing Appearance Moist drainage 09/21/22 1408   Drainage Amount Moderate 09/21/22 1408   Drainage Characteristics/Odor Serosanguineous 09/21/22 1408   Appearance Pink;Slough;Moist 09/21/22 1408   Tissue loss description Full thickness 09/21/22 1408   Periwound Area Intact;Dry 09/21/22 1408   Wound Edges Open 09/21/22 1408   Wound Length (cm) 7.5 cm 09/21/22 1408   Wound Width (cm) 7.5 cm 09/21/22 1408   Wound Depth (cm) 0.4 cm 09/21/22 1408   Wound Volume (cm^3) 22.5 cm^3 09/21/22 1408   Wound Surface Area (cm^2) 56.25 cm^2 09/21/22 1408   Care Cleansed with:;Wound cleanser 09/21/22 1408   Dressing Applied 09/21/22 1500   Compression Two layer compression 09/21/22 1500   Dressing Change Due 09/23/22 09/21/22 1500            (Retired) Wound Venous Ulcer Right lower;medial Leg #2 (Active)        Pre-existing: Yes   Primary Wound Type: Venous ulcer   Side: Right   Orientation: lower;medial   Location: Leg   Wound Number: #2   Ankle-Brachial Index:    Pulses:    Removal Indication and Assessment:    Wound Outcome:    (Retired) Wound Type:    (Retired) Wound Length (cm):    (Retired) Wound Width (cm):    (Retired) Depth (cm):    Wound Description (Comments):    Removal Indications:    Dressing Appearance Dried drainage 09/21/22 1408   Drainage Amount Moderate 09/21/22 1408   Drainage Characteristics/Odor Serosanguineous 09/21/22 1408   Appearance Pink 09/21/22 1408   Tissue loss description Full thickness 09/21/22 1408   Periwound Area Intact;Dry 09/21/22 1408   Wound Edges Open 09/21/22 1408   Wound Length (cm) 0.3 cm 09/21/22 1408  "  Wound Width (cm) 0.5 cm 09/21/22 1408   Wound Depth (cm) 0.2 cm 09/21/22 1408   Wound Volume (cm^3) 0.03 cm^3 09/21/22 1408   Wound Surface Area (cm^2) 0.15 cm^2 09/21/22 1408   Care Cleansed with:;Wound cleanser 09/21/22 1408   Dressing Applied 09/21/22 1500   Compression Two layer compression 09/21/22 1500   Dressing Change Due 09/23/22 09/21/22 1500             Right lower leg   calcium alginate, 4x4 gauze, kerlix, coban        Left lower leg, pre     Post  santyl, mesalt, calcium alginate, ABD pad, kerlix, coban          Left posterior thigh - excoriation  mesalt, mepilex foam, island dressing  ML      Assessment:         ICD-10-CM ICD-9-CM   1. Non-pressure chronic ulcer of left lower leg with fat layer exposed  L97.922 707.10   2. Non-pressure chronic ulcer of right lower leg with fat layer exposed  L97.912 707.10   3. 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 Idiopathic chronic venous hypertension of right lower extremity with ulcer  I87.311 459.31    L97.919    5. Idiopathic chronic venous hypertension of left lower extremity with ulcer  I87.312 459.31    L97.929            Procedures:     Debridement     Date/Time: 9/21/2022 1:34 PM  Performed by: Bonnie Portillo NP  Authorized by: Bonnie Portillo NP      Time out: Immediately prior to procedure a "time out" was called to verify the correct patient, procedure, equipment, support staff and site/side marked as required.     Consent Done?:  Yes (Verbal)  Local anesthesia used?: No       Wound Details:    Location:  Left leg    Type of Debridement:  Non-excisional       Length (cm):  7.5       Area (sq cm):  56.25       Width (cm):  7.5       Percent Debrided (%):  100       Depth (cm):  0.4       Total Area Debrided (sq cm):  56.25    Depth of debridement:  Epidermis/Dermis    Devitalized tissue debrided:  Biofilm, Exudate, Fibrin and Slough    Instruments:  Ultrasound Probe ([facility])      ML     Excisional " debridement performed:          [] Yes [x] No   Selective debridement performed:           [x] Yes [] No   Mechanical debridement performed:        [] Yes [x] No   Silver nitrate applied:                                  [] Yes [x] No   Labs ordered this visit:                                [] Yes [x] No   Imaging ordered this visit:                          [] Yes [x] No   Tissue pathology and/or culture taken:    [] Yes [x] No      MEDICATIONS    Current Outpatient Medications:     acetaminophen (TYLENOL) suppository, Place 325 mg rectally every 4 (four) hours as needed for Temperature greater than., Disp: , Rfl:     calcitRIOL (ROCALTROL) 0.5 MCG Cap, Take 1 capsule by mouth 2 (two) times a day., Disp: , Rfl:     carvediloL (COREG) 12.5 MG tablet, Take 12.5 mg by mouth 2 (two) times daily., Disp: , Rfl:     clopidogreL (PLAVIX) 75 mg tablet, Take 75 mg by mouth once daily., Disp: , Rfl:     FLUoxetine 20 MG capsule, Take 20 mg by mouth once daily., Disp: , Rfl:     furosemide (LASIX) 20 MG tablet, Take 60 mg by mouth once daily., Disp: , Rfl:     midodrine (PROAMATINE) 5 MG Tab, Take 5 mg by mouth. Give 2 tabs (10mg) po TID PRN for BP <110, Disp: , Rfl:     ondansetron (ZOFRAN) 4 MG tablet, Take 4 mg by mouth every 8 (eight) hours as needed for Nausea., Disp: , Rfl:     pantoprazole (PROTONIX) 40 MG tablet, Take 40 mg by mouth once daily at 6am., Disp: , Rfl:     SANTYL ointment, Apply 1 application topically once daily at 6am., Disp: , Rfl:     sodium bicarbonate 650 MG tablet, Take 650 mg by mouth 2 (two) times daily., Disp: , Rfl:     TOPICAL CUSTOM COMPOUND BUILDER, OUTPATIENT,, APPLY 2 GRAMS TO INFECTION SITE. PERFORM 1-2 TIMES DAILY, Disp: , Rfl:     traMADoL (ULTRAM) 50 mg tablet, Take 50 mg by mouth every 6 (six) hours as needed., Disp: , Rfl:     valsartan (DIOVAN) 160 MG tablet, Take 160 mg by mouth 2 (two) times daily., Disp: , Rfl:     VELTASSA 8.4 gram PwPk, Take 1 packet by mouth., Disp: , Rfl:   Review of patient's allergies indicates:  No Known Allergies      HOME HEALTH AGENCY:  [facility]  TIMES PER WEEK/DAYS:  1 x weekly, Friday  WOUND CARE ORDERS:    TOPICAL ABX DISCONTINUED  ORDERS:  Left lateral lower leg-Cleanse wound bed with saline. Apply Santyl nickel thick to wound bed cover with mesalt and calcium alginate. Wrap with Kerlix & coban, starting at base of toes up to 2 inch below knee, to be changed on Monday, Wednesday and Friday.   Right medial lower leg- Cleanse with wound cleanser. Apply silver alginate to wound and cover with gauze, wrap with Kerlix & Coban, starting at base of toes up to 2 inch below knee, to be changed on Monday, Wednesday and Friday.   Left posterior thigh- Cleanse and apply mesalt to the wound bed, cover with mepilex foam and an island dressing, to be changed on Monday, Wednesday and Friday.      Follow up in about 1 week (around 9/28/2022) for Venous ulcers, skin tear .        Electronically signed:  Bonnie Portillo NP

## 2022-10-05 ENCOUNTER — HOSPITAL ENCOUNTER (OUTPATIENT)
Dept: WOUND CARE | Facility: HOSPITAL | Age: 82
Discharge: HOME | End: 2022-10-05
Attending: NURSE PRACTITIONER
Payer: MEDICARE

## 2022-10-05 VITALS
HEIGHT: 72 IN | BODY MASS INDEX: 35.21 KG/M2 | SYSTOLIC BLOOD PRESSURE: 143 MMHG | DIASTOLIC BLOOD PRESSURE: 49 MMHG | RESPIRATION RATE: 20 BRPM | WEIGHT: 260 LBS | HEART RATE: 56 BPM

## 2022-10-05 DIAGNOSIS — L97.922 NON-PRESSURE CHRONIC ULCER OF LEFT LOWER LEG WITH FAT LAYER EXPOSED: Primary | ICD-10-CM

## 2022-10-05 DIAGNOSIS — L97.912 NON-PRESSURE CHRONIC ULCER OF RIGHT LOWER LEG WITH FAT LAYER EXPOSED: ICD-10-CM

## 2022-10-05 DIAGNOSIS — I87.312 IDIOPATHIC CHRONIC VENOUS HYPERTENSION OF LEFT LOWER EXTREMITY WITH ULCER: ICD-10-CM

## 2022-10-05 DIAGNOSIS — L97.919 IDIOPATHIC CHRONIC VENOUS HYPERTENSION OF RIGHT LOWER EXTREMITY WITH ULCER: ICD-10-CM

## 2022-10-05 DIAGNOSIS — I87.311 IDIOPATHIC CHRONIC VENOUS HYPERTENSION OF RIGHT LOWER EXTREMITY WITH ULCER: ICD-10-CM

## 2022-10-05 DIAGNOSIS — L97.121 NON-PRESSURE CHRONIC ULCER OF LEFT THIGH LIMITED TO BREAKDOWN OF SKIN: ICD-10-CM

## 2022-10-05 DIAGNOSIS — L97.929 IDIOPATHIC CHRONIC VENOUS HYPERTENSION OF LEFT LOWER EXTREMITY WITH ULCER: ICD-10-CM

## 2022-10-05 PROCEDURE — 99213 OFFICE O/P EST LOW 20 MIN: CPT

## 2022-10-05 PROCEDURE — 27000999 HC MEDICAL RECORD PHOTO DOCUMENTATION

## 2022-10-06 NOTE — PROGRESS NOTES
Subjective:       Patient ID: Jil Graves is a 82 y.o. female.    Chief Complaint: Venous Ulcer (Left lateral lower leg/Right medial lower leg ) and Wound Care (Left posterior thigh )    HPI     **ULTRASONIC DEBRIDER NEXT WEEK**   *Authorize Theraskin*      Ms. Graves returns today for follow up for bilateral lower extremity venous ulcers.     The wound on the left lower extremity continues filling in.   We will attempt authorization for another round of theraskin grafts.  Her last grafts were in mid October, 2021.  We will plan to begin grafting again once approved.  Today the wound was mechanically debrided with removal of slough and biofilm.  The plan will be to use ultrasonic debride her on the wound at the next visit in wound prep and to apply that there skin graft the following week if it is approved.    The right lower extremity is nearly healed at this point.  At this time we are using Betadine just to keep the area dry with a simple dry dressing over.    She does have the wound on the back of the left thigh that we are now a dressing.  We are applying endoform with the hopes that it will remain in place during her transfers.    Review of Systems   Musculoskeletal:  Positive for gait problem.   Skin:  Positive for wound.   All other systems reviewed and are negative.      Objective:      Vitals:    10/05/22 1117   BP: (!) 143/49   Pulse: (!) 56   Resp: 20       Physical Exam  Vitals reviewed. Exam conducted with a chaperone present (Daughter).   Constitutional:       Appearance: She is obese.   HENT:      Head: Normocephalic.   Eyes:      Extraocular Movements: Extraocular movements intact.      Pupils: Pupils are equal, round, and reactive to light.   Cardiovascular:      Rate and Rhythm: Normal rate.      Pulses: Normal pulses.   Pulmonary:      Effort: Pulmonary effort is normal.      Comments: SOB with exertion  Musculoskeletal:      Right lower leg: Edema present.      Left lower leg: Edema  present.      Comments: Edema is improving.  Ambulation difficult due to knee pain.    Skin:     General: Skin is warm and dry.      Findings: Wound present.      Comments: Bilateral lower extremity wounds.  Callus to left 2nd toe.   Neurological:      General: No focal deficit present.      Mental Status: She is alert and oriented to person, place, and time.   Psychiatric:         Mood and Affect: Mood normal.          Altered Skin Integrity 09/14/22 1205 Left posterior Thigh #5 Skin Tear Partial thickness tissue loss. Shallow open ulcer with a red or pink wound bed, without slough. Intact or Open/Ruptured Serum-filled blister. (Active)   09/14/22 1205   Altered Skin Integrity Present on Admission: yes   Side: Left   Orientation: posterior   Location: Thigh   Wound Number: #5   Is this injury device related?: Yes   Primary Wound Type: Skin tear   Description of Altered Skin Integrity: Partial thickness tissue loss. Shallow open ulcer with a red or pink wound bed, without slough. Intact or Open/Ruptured Serum-filled blister.   Ankle-Brachial Index:    Pulses:    Removal Indication and Assessment:    Wound Outcome:    (Retired) Wound Length (cm):    (Retired) Wound Width (cm):    (Retired) Depth (cm):    Wound Description (Comments):    Removal Indications:    Dressing Appearance Open to air 10/05/22 1120   Drainage Amount None 10/05/22 1120   Appearance Pink 10/05/22 1120   Tissue loss description Full thickness 10/05/22 1120   Periwound Area Intact 10/05/22 1120   Wound Edges Open 10/05/22 1120   Wound Length (cm) 1.5 cm 10/05/22 1120   Wound Width (cm) 1.5 cm 10/05/22 1120   Wound Depth (cm) 0.2 cm 10/05/22 1120   Wound Volume (cm^3) 0.45 cm^3 10/05/22 1120   Wound Surface Area (cm^2) 2.25 cm^2 10/05/22 1120   Care Cleansed with:;Wound cleanser 10/05/22 1120   Dressing Applied 10/05/22 1219   Dressing Change Due 10/12/22 10/05/22 1219            Wound 05/11/22 1157 Venous Ulcer Left lower;lateral Leg #1 (Active)    05/11/22 1157    Pre-existing:    Primary Wound Type: Venous ulcer   Side: Left   Orientation: lower;lateral   Location: Leg   Wound Number: #1   Ankle-Brachial Index:    Pulses:    Removal Indication and Assessment:    Wound Outcome:    (Retired) Wound Type:    (Retired) Wound Length (cm):    (Retired) Wound Width (cm):    (Retired) Depth (cm):    Wound Description (Comments):    Removal Indications:    Dressing Appearance Moist drainage 10/05/22 1120   Drainage Amount Moderate 10/05/22 1120   Drainage Characteristics/Odor Green;Clear 10/05/22 1120   Appearance Red;Hypergranulation;Moist 10/05/22 1120   Tissue loss description Full thickness 10/05/22 1120   Periwound Area Swelling 10/05/22 1120   Wound Edges Open 10/05/22 1120   Wound Length (cm) 7.5 cm 10/05/22 1120   Wound Width (cm) 7.8 cm 10/05/22 1120   Wound Depth (cm) 0.4 cm 10/05/22 1120   Wound Volume (cm^3) 23.4 cm^3 10/05/22 1120   Wound Surface Area (cm^2) 58.5 cm^2 10/05/22 1120   Care Cleansed with:;Wound cleanser 10/05/22 1120   Dressing Applied 10/05/22 1219   Compression Two layer compression 10/05/22 1219   Dressing Change Due 10/07/22 10/05/22 1219            (Retired) Wound Venous Ulcer Right lower;medial Leg #2 (Active)        Pre-existing: Yes   Primary Wound Type: Venous ulcer   Side: Right   Orientation: lower;medial   Location: Leg   Wound Number: #2   Ankle-Brachial Index:    Pulses:    Removal Indication and Assessment:    Wound Outcome:    (Retired) Wound Type:    (Retired) Wound Length (cm):    (Retired) Wound Width (cm):    (Retired) Depth (cm):    Wound Description (Comments):    Removal Indications:    Dressing Appearance Moist drainage 10/05/22 1120   Drainage Amount Moderate 10/05/22 1120   Drainage Characteristics/Odor Serosanguineous 10/05/22 1120   Appearance Pink;Moist 10/05/22 1120   Tissue loss description Full thickness 10/05/22 1120   Periwound Area Swelling 10/05/22 1120   Wound Edges Open 10/05/22 1120   Wound Length  (cm) 0.4 cm 10/05/22 1120   Wound Width (cm) 1 cm 10/05/22 1120   Wound Depth (cm) 0.2 cm 10/05/22 1120   Wound Volume (cm^3) 0.08 cm^3 10/05/22 1120   Wound Surface Area (cm^2) 0.4 cm^2 10/05/22 1120   Care Cleansed with:;Wound cleanser 10/05/22 1120   Dressing Applied 10/05/22 1219   Compression Two layer compression 10/05/22 1219   Dressing Change Due 10/07/22 10/05/22 1219           Right lower leg   Betadine, Island dressing        Left lower leg, pre     Post (mechanical only)  santyl, mesalt, calcium algianate, ABD pad, kerlix, coban      NO PHOTO    Left posterior thigh - excoriation  endoform, island dressing  ML      Assessment:         ICD-10-CM ICD-9-CM   1. Non-pressure chronic ulcer of left lower leg with fat layer exposed  L97.922 707.10   2. Non-pressure chronic ulcer of right lower leg with fat layer exposed  L97.912 707.10   3. 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 Idiopathic chronic venous hypertension of right lower extremity with ulcer  I87.311 459.31    L97.919    5. Idiopathic chronic venous hypertension of left lower extremity with ulcer  I87.312 459.31    L97.929            Procedures:     No procedures performed.     Excisional debridement performed:          [] Yes [x] No   Selective debridement performed:           [] Yes [x] No   Mechanical debridement performed:        [x] Yes [] No   Silver nitrate applied:                                  [] Yes [x] No   Labs ordered this visit:                                [] Yes [x] No   Imaging ordered this visit:                          [] Yes [x] No   Tissue pathology and/or culture taken:    [] Yes [x] No      MEDICATIONS    Current Outpatient Medications:     acetaminophen (TYLENOL) suppository, Place 325 mg rectally every 4 (four) hours as needed for Temperature greater than., Disp: , Rfl:     calcitRIOL (ROCALTROL) 0.5 MCG Cap, Take 1 capsule by mouth 2 (two) times a day., Disp: , Rfl:      carvediloL (COREG) 12.5 MG tablet, Take 12.5 mg by mouth 2 (two) times daily., Disp: , Rfl:     clopidogreL (PLAVIX) 75 mg tablet, Take 75 mg by mouth once daily., Disp: , Rfl:     FLUoxetine 20 MG capsule, Take 20 mg by mouth once daily., Disp: , Rfl:     furosemide (LASIX) 20 MG tablet, Take 60 mg by mouth once daily., Disp: , Rfl:     midodrine (PROAMATINE) 5 MG Tab, Take 5 mg by mouth. Give 2 tabs (10mg) po TID PRN for BP <110, Disp: , Rfl:     ondansetron (ZOFRAN) 4 MG tablet, Take 4 mg by mouth every 8 (eight) hours as needed for Nausea., Disp: , Rfl:     pantoprazole (PROTONIX) 40 MG tablet, Take 40 mg by mouth once daily at 6am., Disp: , Rfl:     SANTYL ointment, Apply 1 application topically once daily at 6am., Disp: , Rfl:     sodium bicarbonate 650 MG tablet, Take 650 mg by mouth 2 (two) times daily., Disp: , Rfl:     traMADoL (ULTRAM) 50 mg tablet, Take 50 mg by mouth every 6 (six) hours as needed., Disp: , Rfl:     valsartan (DIOVAN) 160 MG tablet, Take 160 mg by mouth 2 (two) times daily., Disp: , Rfl:     VELTASSA 8.4 gram PwPk, Take 1 packet by mouth., Disp: , Rfl:     TOPICAL CUSTOM COMPOUND BUILDER, OUTPATIENT,, APPLY 2 GRAMS TO INFECTION SITE. PERFORM 1-2 TIMES DAILY, Disp: , Rfl:  Review of patient's allergies indicates:  No Known Allergies      [facility]:  [facility]  TIMES PER WEEK/DAYS:  1 x weekly, Friday  WOUND CARE ORDERS:    Left lateral lower leg-Cleanse wound bed with saline. Apply Santyl nickel thick to wound bed cover with mesalt and calcium alginate. Wrap with Kerlix & coban, starting at base of toes up to 2 inch below knee, to be changed on Monday, Wednesday and Friday.   Right medial lower leg wound: Cleanse with wound cleanser and apply betadine liberally to the wound bed. Wrap leg with Kerlix & coban, starting at base of toes up to 2 inch below knee, to be changed on Monday, Wednesday and Friday.   Left posterior thigh wound: **Do NOT remove** This will be changed  only when patient comes to wound care, if the dressing were to fall off, can apply silver alginate.       Follow up in about 1 week (around 10/12/2022) for Bilateral lower leg wounds/left thigh wound.        Electronically signed:  Bonnie Portillo NP

## 2022-10-12 ENCOUNTER — HOSPITAL ENCOUNTER (OUTPATIENT)
Dept: WOUND CARE | Facility: HOSPITAL | Age: 82
Discharge: HOME | End: 2022-10-12
Attending: NURSE PRACTITIONER
Payer: MEDICARE

## 2022-10-12 VITALS
BODY MASS INDEX: 35.21 KG/M2 | HEIGHT: 72 IN | RESPIRATION RATE: 18 BRPM | DIASTOLIC BLOOD PRESSURE: 59 MMHG | HEART RATE: 63 BPM | WEIGHT: 260 LBS | SYSTOLIC BLOOD PRESSURE: 133 MMHG

## 2022-10-12 DIAGNOSIS — I87.311 IDIOPATHIC CHRONIC VENOUS HYPERTENSION OF RIGHT LOWER EXTREMITY WITH ULCER: ICD-10-CM

## 2022-10-12 DIAGNOSIS — L97.919 IDIOPATHIC CHRONIC VENOUS HYPERTENSION OF RIGHT LOWER EXTREMITY WITH ULCER: ICD-10-CM

## 2022-10-12 DIAGNOSIS — L97.929 IDIOPATHIC CHRONIC VENOUS HYPERTENSION OF LEFT LOWER EXTREMITY WITH ULCER: ICD-10-CM

## 2022-10-12 DIAGNOSIS — I87.312 IDIOPATHIC CHRONIC VENOUS HYPERTENSION OF LEFT LOWER EXTREMITY WITH ULCER: ICD-10-CM

## 2022-10-12 DIAGNOSIS — L97.912 NON-PRESSURE CHRONIC ULCER OF RIGHT LOWER LEG WITH FAT LAYER EXPOSED: ICD-10-CM

## 2022-10-12 DIAGNOSIS — L97.121 NON-PRESSURE CHRONIC ULCER OF LEFT THIGH LIMITED TO BREAKDOWN OF SKIN: ICD-10-CM

## 2022-10-12 DIAGNOSIS — L97.922 NON-PRESSURE CHRONIC ULCER OF LEFT LOWER LEG WITH FAT LAYER EXPOSED: Primary | ICD-10-CM

## 2022-10-12 PROCEDURE — 27000999 HC MEDICAL RECORD PHOTO DOCUMENTATION

## 2022-10-12 PROCEDURE — 15002 WOUND PREP TRK/ARM/LEG: CPT

## 2022-10-12 PROCEDURE — 97597 DBRDMT OPN WND 1ST 20 CM/<: CPT | Mod: 59

## 2022-10-12 PROCEDURE — 99214 OFFICE O/P EST MOD 30 MIN: CPT | Mod: 25

## 2022-10-12 RX ORDER — AMLODIPINE BESYLATE 10 MG/1
TABLET ORAL
COMMUNITY
Start: 2022-10-08

## 2022-10-12 NOTE — PROCEDURES
"Debridement    Date/Time: 10/12/2022 11:00 AM  Performed by: Bonnie Portillo NP  Authorized by: Bonnie Portillo NP     Time out: Immediately prior to procedure a "time out" was called to verify the correct patient, procedure, equipment, support staff and site/side marked as required.    Consent Done?:  Yes (Verbal)  Local anesthetic:  Lidocaine 2% without epinephrine    Wound Details:    Location:  Left leg    Type of Debridement:  Non-excisional       Length (cm):  7.2       Area (sq cm):  50.4       Width (cm):  7       Percent Debrided (%):  100       Depth (cm):  0.3       Total Area Debrided (sq cm):  50.4    Depth of debridement:  Epidermis/Dermis    Devitalized tissue debrided:  Biofilm, Exudate, Fibrin and Slough    Instruments:  Ultrasound Probe (Rosado Probe)     ML  "

## 2022-10-12 NOTE — PROGRESS NOTES
Subjective:       Patient ID: Jil Graves is a 82 y.o. female.    Chief Complaint: Venous Ulcer (Left lateral lower leg /Left medial lower leg ) and Wound Care (Left posterior thigh - skin tear )    HPI     **ULTRASONIC DEBRIDER NEXT WEEK**   *Apply TheraSkin #1 next week*    Ms. Graves returns today for follow up for bilateral lower extremity venous ulcers.     The wound on the left lower extremity continues filling in.   We have received authorization to apply theraskin grafts to the wound on the left lower extremity.  Today the wound was selectively debrided with the ultrasonic debrider in preparation of application of the TheraSkin graft next week.  There was a considerable amount of slough in the wound bed.  To ensure that the wound bed is prepared we will use the ultrasonic debrider again next week before we apply the graft.    The right lower extremity is nearly healed at this point.  At this time we are using Betadine just to keep the area dry with a simple dry dressing over.    She does have the wound on the back of the left thigh that we are now a dressing.  We are applying endoform with the hopes that it will remain in place during her transfers.  Today she did arrive with no dressing in place.  We have applied an island dressing and encouraged her to ensure that this wound does remain covered as it is enlarging.    Review of Systems   Musculoskeletal:  Positive for gait problem.   Skin:  Positive for wound.   All other systems reviewed and are negative.      Objective:      Vitals:    10/12/22 1124   BP: (!) 133/59   Pulse: 63   Resp: 18       Physical Exam  Vitals reviewed. Exam conducted with a chaperone present (Daughter).   Constitutional:       Appearance: She is obese.   HENT:      Head: Normocephalic.   Eyes:      Extraocular Movements: Extraocular movements intact.      Pupils: Pupils are equal, round, and reactive to light.   Cardiovascular:      Rate and Rhythm: Normal rate.      Pulses:  Normal pulses.   Pulmonary:      Effort: Pulmonary effort is normal.      Comments: SOB with exertion  Musculoskeletal:      Right lower leg: Edema present.      Left lower leg: Edema present.      Comments: Edema is improving.  Ambulation difficult due to knee pain.    Skin:     General: Skin is warm and dry.      Findings: Wound present.      Comments: Bilateral lower extremity wounds.  Callus to left 2nd toe.   Neurological:      General: No focal deficit present.      Mental Status: She is alert and oriented to person, place, and time.   Psychiatric:         Mood and Affect: Mood normal.          Altered Skin Integrity 09/14/22 1205 Left posterior Thigh #5 Skin Tear Partial thickness tissue loss. Shallow open ulcer with a red or pink wound bed, without slough. Intact or Open/Ruptured Serum-filled blister. (Active)   09/14/22 1205   Altered Skin Integrity Present on Admission: yes   Side: Left   Orientation: posterior   Location: Thigh   Wound Number: #5   Is this injury device related?: Yes   Primary Wound Type: Skin tear   Description of Altered Skin Integrity: Partial thickness tissue loss. Shallow open ulcer with a red or pink wound bed, without slough. Intact or Open/Ruptured Serum-filled blister.   Ankle-Brachial Index:    Pulses:    Removal Indication and Assessment:    Wound Outcome:    (Retired) Wound Length (cm):    (Retired) Wound Width (cm):    (Retired) Depth (cm):    Wound Description (Comments):    Removal Indications:    Dressing Appearance Open to air 10/12/22 1247   Drainage Amount Small 10/12/22 1247   Drainage Characteristics/Odor Serosanguineous 10/12/22 1247   Appearance Pink 10/12/22 1247   Tissue loss description Full thickness 10/12/22 1247   Periwound Area Intact 10/12/22 1247   Wound Edges Open 10/12/22 1247   Wound Length (cm) 1 cm 10/12/22 1247   Wound Width (cm) 1 cm 10/12/22 1247   Wound Depth (cm) 0.2 cm 10/12/22 1247   Wound Volume (cm^3) 0.2 cm^3 10/12/22 1247   Wound Surface  Area (cm^2) 1 cm^2 10/12/22 1247   Care Cleansed with:;Wound cleanser 10/12/22 1247   Dressing Applied 10/12/22 1247   Dressing Change Due 10/14/22 10/12/22 1247            Wound 05/11/22 1157 Venous Ulcer Left lower;lateral Leg #1 (Active)   05/11/22 1157    Pre-existing:    Primary Wound Type: Venous ulcer   Side: Left   Orientation: lower;lateral   Location: Leg   Wound Number: #1   Ankle-Brachial Index:    Pulses:    Removal Indication and Assessment:    Wound Outcome:    (Retired) Wound Type:    (Retired) Wound Length (cm):    (Retired) Wound Width (cm):    (Retired) Depth (cm):    Wound Description (Comments):    Removal Indications:    Dressing Appearance Moist drainage 10/12/22 1135   Drainage Amount Large 10/12/22 1135   Drainage Characteristics/Odor Serosanguineous 10/12/22 1135   Appearance Pink;Slough;Black;Wet 10/12/22 1135   Tissue loss description Full thickness 10/12/22 1135   Periwound Area Intact 10/12/22 1135   Wound Edges Open 10/12/22 1135   Wound Length (cm) 7.2 cm 10/12/22 1135   Wound Width (cm) 7 cm 10/12/22 1135   Wound Depth (cm) 0.3 cm 10/12/22 1135   Wound Volume (cm^3) 15.12 cm^3 10/12/22 1135   Wound Surface Area (cm^2) 50.4 cm^2 10/12/22 1135   Care Cleansed with:;Wound cleanser 10/12/22 1135   Dressing Applied 10/12/22 1135   Compression Two layer compression 10/12/22 1135   Dressing Change Due 10/14/22 10/12/22 1135            (Retired) Wound Venous Ulcer Right lower;medial Leg #2 (Active)        Pre-existing: Yes   Primary Wound Type: Venous ulcer   Side: Right   Orientation: lower;medial   Location: Leg   Wound Number: #2   Ankle-Brachial Index:    Pulses:    Removal Indication and Assessment:    Wound Outcome:    (Retired) Wound Type:    (Retired) Wound Length (cm):    (Retired) Wound Width (cm):    (Retired) Depth (cm):    Wound Description (Comments):    Removal Indications:    Dressing Appearance Moist drainage 10/12/22 1135   Drainage Amount Moderate 10/12/22 1135   Drainage  "Characteristics/Odor Serosanguineous 10/12/22 1135   Appearance Pink;Moist 10/12/22 1135   Tissue loss description Full thickness 10/12/22 1135   Periwound Area Intact 10/12/22 1135   Wound Edges Open 10/12/22 1135   Wound Length (cm) 0.5 cm 10/12/22 1135   Wound Width (cm) 1 cm 10/12/22 1135   Wound Depth (cm) 0.2 cm 10/12/22 1135   Wound Volume (cm^3) 0.1 cm^3 10/12/22 1135   Wound Surface Area (cm^2) 0.5 cm^2 10/12/22 1135   Care Cleansed with:;Wound cleanser 10/12/22 1135   Dressing Applied 10/12/22 1135   Compression Two layer compression 10/12/22 1135   Dressing Change Due 10/14/22 10/12/22 1135             Right lower leg      Left posterior thigh  betadine, 4x4 gauze, kerlix, coban   endoform, island dressing        Left lower leg, pre     Post   santyl, mesalt, calcium alginate, ABD pad, kerlix, coban  ML      Assessment:         ICD-10-CM ICD-9-CM   1. Non-pressure chronic ulcer of left lower leg with fat layer exposed  L97.922 707.10   2. Non-pressure chronic ulcer of right lower leg with fat layer exposed  L97.912 707.10   3. 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 Idiopathic chronic venous hypertension of right lower extremity with ulcer  I87.311 459.31    L97.919    5. Idiopathic chronic venous hypertension of left lower extremity with ulcer  I87.312 459.31    L97.929            Procedures:     Debridement     Date/Time: 10/12/2022 11:00 AM  Performed by: Bonnie Portillo NP  Authorized by: Bonnie Portillo NP      Time out: Immediately prior to procedure a "time out" was called to verify the correct patient, procedure, equipment, support staff and site/side marked as required.     Consent Done?:  Yes (Verbal)  Local anesthetic:  Lidocaine 2% without epinephrine     Wound Details:    Location:  Left leg    Type of Debridement:  Non-excisional       Length (cm):  7.2       Area (sq cm):  50.4       Width (cm):  7       Percent Debrided (%):  100       " Depth (cm):  0.3       Total Area Debrided (sq cm):  50.4    Depth of debridement:  Epidermis/Dermis    Devitalized tissue debrided:  Biofilm, Exudate, Fibrin and Slough    Instruments:  Ultrasound Probe (Rosado Probe)      ML     Excisional debridement performed:          [] Yes [x] No   Selective debridement performed:           [x] Yes [] No Ultrasonic debridement  Mechanical debridement performed:        [] Yes [x] No   Silver nitrate applied:                                  [] Yes [x] No   Labs ordered this visit:                                [] Yes [x] No   Imaging ordered this visit:                          [] Yes [x] No   Tissue pathology and/or culture taken:    [] Yes [x] No      MEDICATIONS    Current Outpatient Medications:     acetaminophen (TYLENOL) suppository, Place 325 mg rectally every 4 (four) hours as needed for Temperature greater than., Disp: , Rfl:     amLODIPine (NORVASC) 10 MG tablet, , Disp: , Rfl:     calcitRIOL (ROCALTROL) 0.5 MCG Cap, Take 1 capsule by mouth 2 (two) times a day., Disp: , Rfl:     carvediloL (COREG) 12.5 MG tablet, Take 12.5 mg by mouth 2 (two) times daily., Disp: , Rfl:     clopidogreL (PLAVIX) 75 mg tablet, Take 75 mg by mouth once daily., Disp: , Rfl:     FLUoxetine 20 MG capsule, Take 20 mg by mouth once daily., Disp: , Rfl:     furosemide (LASIX) 20 MG tablet, Take 60 mg by mouth once daily., Disp: , Rfl:     midodrine (PROAMATINE) 5 MG Tab, Take 5 mg by mouth. Give 2 tabs (10mg) po TID PRN for BP <110, Disp: , Rfl:     ondansetron (ZOFRAN) 4 MG tablet, Take 4 mg by mouth every 8 (eight) hours as needed for Nausea., Disp: , Rfl:     pantoprazole (PROTONIX) 40 MG tablet, Take 40 mg by mouth once daily at 6am., Disp: , Rfl:     SANTYL ointment, Apply 1 application topically once daily at 6am., Disp: , Rfl:     traMADoL (ULTRAM) 50 mg tablet, Take 50 mg by mouth every 6 (six) hours as needed., Disp: , Rfl:     valsartan (DIOVAN) 160 MG tablet, Take 160 mg by mouth 2  (two) times daily., Disp: , Rfl:     VELTASSA 8.4 gram PwPk, Take 1 packet by mouth., Disp: , Rfl:     sodium bicarbonate 650 MG tablet, Take 650 mg by mouth 2 (two) times daily., Disp: , Rfl:     TOPICAL CUSTOM COMPOUND BUILDER, OUTPATIENT,, APPLY 2 GRAMS TO INFECTION SITE. PERFORM 1-2 TIMES DAILY, Disp: , Rfl:  Review of patient's allergies indicates:  No Known Allergies      [facility]ALTH AGENCY:  [facility]  TIMES PER WEEK/DAYS:  1 x weekly, Friday  WOUND CARE ORDERS:  Left lateral lower leg-Cleanse wound bed with saline. Apply Santyl nickel thick to wound bed cover with mesalt and calcium alginate.Cover with an ABD pad.  Wrap with Kerlix & coban, starting at base of toes up to 2 inch below knee, to be changed on Monday, Wednesday and Friday.   Right medial lower leg wound: Cleanse with wound cleanser and apply betadine liberally to the wound bed. Wrap leg with Kerlix & coban, starting at base of toes up to 2 inch below knee, to be changed on Monday, Wednesday and Friday.   Left posterior thigh wound: **Do NOT remove** This will be changed only when patient comes to wound care, if the dressing were to fall off, can apply silver alginate.       Follow up in about 1 week (around 10/19/2022) for Venous ulcers .        Electronically signed:  Bonnie Portillo NP

## 2022-10-19 ENCOUNTER — HOSPITAL ENCOUNTER (OUTPATIENT)
Dept: WOUND CARE | Facility: HOSPITAL | Age: 82
Discharge: HOME | End: 2022-10-19
Attending: NURSE PRACTITIONER
Payer: MEDICARE

## 2022-10-19 VITALS
BODY MASS INDEX: 35.21 KG/M2 | SYSTOLIC BLOOD PRESSURE: 134 MMHG | HEART RATE: 67 BPM | DIASTOLIC BLOOD PRESSURE: 68 MMHG | HEIGHT: 72 IN | RESPIRATION RATE: 18 BRPM | WEIGHT: 260 LBS

## 2022-10-19 DIAGNOSIS — L97.922 NON-PRESSURE CHRONIC ULCER OF LEFT LOWER LEG WITH FAT LAYER EXPOSED: Primary | ICD-10-CM

## 2022-10-19 DIAGNOSIS — L97.121 NON-PRESSURE CHRONIC ULCER OF LEFT THIGH LIMITED TO BREAKDOWN OF SKIN: ICD-10-CM

## 2022-10-19 DIAGNOSIS — L97.912 NON-PRESSURE CHRONIC ULCER OF RIGHT LOWER LEG WITH FAT LAYER EXPOSED: ICD-10-CM

## 2022-10-19 PROCEDURE — 99213 OFFICE O/P EST LOW 20 MIN: CPT

## 2022-10-19 PROCEDURE — 27000999 HC MEDICAL RECORD PHOTO DOCUMENTATION

## 2022-10-19 PROCEDURE — 97598 DBRDMT OPN WND ADDL 20CM/<: CPT

## 2022-10-19 PROCEDURE — 97597 DBRDMT OPN WND 1ST 20 CM/<: CPT

## 2022-10-19 NOTE — PROGRESS NOTES
Subjective:       Patient ID: Jil Graves is a 82 y.o. female.    Chief Complaint: Venous Ulcer (Left lateral lower leg /Left medial lower leg) and Wound Care (Left posterior thigh - skin tear)    HPI     **ULTRASONIC DEBRIDER NEXT WEEK**   *Possibly apply TheraSkin #1 next week*    Ms. Graves returns today for follow up for bilateral lower extremity venous ulcers.     The wound on the left lower extremity continues filling in.   We have received authorization to apply theraskin grafts to the wound on the left lower extremity.  The wound was prepped for grafting last week with the expectation of beginning the grafting process this week.  However, at assessment today the exudate had a pronounced green tinge.  The patient does have a history of pseudomonas in that wound.  We will begin daily washes with acetic acid/normal saline mix, and assess at the next visit to determine if grafting is appropriate at that time.  The wound was thoroughly debrided using the ultrasonic debrider at this visit.     The right lower extremity is pin point size at this point.  We will continue using Betadine just to keep the area dry with a simple dry dressing over.    She does have the wound on the back of the left thigh that we are now a dressing.  We are applying endoform with the hopes that it will remain in place during her transfers.  This wound is making excellent progress.      Review of Systems   Musculoskeletal:  Positive for gait problem.   Skin:  Positive for wound.   All other systems reviewed and are negative.      Objective:      Vitals:    10/19/22 1230   BP: 134/68   Pulse: 67   Resp: 18       Physical Exam  Vitals reviewed. Exam conducted with a chaperone present (Daughter).   Constitutional:       Appearance: She is obese.   HENT:      Head: Normocephalic.   Eyes:      Extraocular Movements: Extraocular movements intact.      Pupils: Pupils are equal, round, and reactive to light.   Cardiovascular:      Rate and  Rhythm: Normal rate.      Pulses: Normal pulses.   Pulmonary:      Effort: Pulmonary effort is normal.      Comments: SOB with exertion  Musculoskeletal:      Right lower leg: Edema present.      Left lower leg: Edema present.      Comments: Edema is improving.  Ambulation difficult due to knee pain.    Skin:     General: Skin is warm and dry.      Findings: Wound present.      Comments: Bilateral lower extremity wounds.  Callus to left 2nd toe.   Neurological:      General: No focal deficit present.      Mental Status: She is alert and oriented to person, place, and time.   Psychiatric:         Mood and Affect: Mood normal.          Altered Skin Integrity 09/14/22 1205 Left posterior Thigh #5 Skin Tear Partial thickness tissue loss. Shallow open ulcer with a red or pink wound bed, without slough. Intact or Open/Ruptured Serum-filled blister. (Active)   09/14/22 1205   Altered Skin Integrity Present on Admission: yes   Side: Left   Orientation: posterior   Location: Thigh   Wound Number: #5   Is this injury device related?: Yes   Primary Wound Type: Skin tear   Description of Altered Skin Integrity: Partial thickness tissue loss. Shallow open ulcer with a red or pink wound bed, without slough. Intact or Open/Ruptured Serum-filled blister.   Ankle-Brachial Index:    Pulses:    Removal Indication and Assessment:    Wound Outcome:    (Retired) Wound Length (cm):    (Retired) Wound Width (cm):    (Retired) Depth (cm):    Wound Description (Comments):    Removal Indications:    Dressing Appearance Moist drainage 10/19/22 1231   Drainage Amount Small 10/19/22 1231   Drainage Characteristics/Odor Serosanguineous 10/19/22 1231   Appearance Red;Granulating;Moist 10/19/22 1231   Tissue loss description Full thickness 10/19/22 1231   Periwound Area Intact;Moist 10/19/22 1231   Wound Edges Open 10/19/22 1231   Wound Length (cm) 0.5 cm 10/19/22 1231   Wound Width (cm) 0.5 cm 10/19/22 1231   Wound Depth (cm) 0.2 cm 10/19/22 1231    Wound Volume (cm^3) 0.05 cm^3 10/19/22 1231   Wound Surface Area (cm^2) 0.25 cm^2 10/19/22 1231   Care Cleansed with:;Wound cleanser 10/19/22 1231   Dressing Applied;Other (comment) 10/19/22 1231   Dressing Change Due 10/26/22 10/19/22 1231            Wound 05/11/22 1157 Venous Ulcer Left lower;lateral Leg #1 (Active)   05/11/22 1157    Pre-existing:    Primary Wound Type: Venous ulcer   Side: Left   Orientation: lower;lateral   Location: Leg   Wound Number: #1   Ankle-Brachial Index:    Pulses:    Removal Indication and Assessment:    Wound Outcome:    (Retired) Wound Type:    (Retired) Wound Length (cm):    (Retired) Wound Width (cm):    (Retired) Depth (cm):    Wound Description (Comments):    Removal Indications:    Dressing Appearance Moist drainage 10/19/22 1231   Drainage Amount Moderate 10/19/22 1231   Drainage Characteristics/Odor Green;Serosanguineous;Malodorous 10/19/22 1231   Appearance Slough;Tan;Moist 10/19/22 1231   Tissue loss description Full thickness 10/19/22 1231   Periwound Area Intact;Moist 10/19/22 1231   Wound Edges Open 10/19/22 1231   Wound Length (cm) 7.1 cm 10/19/22 1231   Wound Width (cm) 6.6 cm 10/19/22 1231   Wound Depth (cm) 0.3 cm 10/19/22 1231   Wound Volume (cm^3) 14.058 cm^3 10/19/22 1231   Wound Surface Area (cm^2) 46.86 cm^2 10/19/22 1231   Care Cleansed with:;Wound cleanser 10/19/22 1231   Dressing Applied;Other (comment) 10/19/22 1231   Dressing Change Due 10/20/22 10/19/22 1231            (Retired) Wound Venous Ulcer Right lower;medial Leg #2 (Active)        Pre-existing: Yes   Primary Wound Type: Venous ulcer   Side: Right   Orientation: lower;medial   Location: Leg   Wound Number: #2   Ankle-Brachial Index:    Pulses:    Removal Indication and Assessment:    Wound Outcome:    (Retired) Wound Type:    (Retired) Wound Length (cm):    (Retired) Wound Width (cm):    (Retired) Depth (cm):    Wound Description (Comments):    Removal Indications:    Dressing Appearance Moist  "drainage 10/19/22 1231   Drainage Amount Moderate 10/19/22 1231   Drainage Characteristics/Odor Serosanguineous 10/19/22 1231   Appearance Slough;Moist 10/19/22 1231   Tissue loss description Full thickness 10/19/22 1231   Periwound Area Intact;Moist 10/19/22 1231   Wound Edges Open 10/19/22 1231   Wound Length (cm) 0.1 cm 10/19/22 1231   Wound Width (cm) 0.1 cm 10/19/22 1231   Wound Depth (cm) 0.1 cm 10/19/22 1231   Wound Volume (cm^3) 0.001 cm^3 10/19/22 1231   Wound Surface Area (cm^2) 0.01 cm^2 10/19/22 1231   Care Cleansed with:;Wound cleanser 10/19/22 1231   Dressing Applied;Other (comment) 10/19/22 1231   Dressing Change Due 10/21/22 10/19/22 1231             Right lower leg      Left posterior thigh  betadine, 4x4 gentle foam border dressing  endoform, island dressing        Left lower leg, pre     Post   santyl, mesalt, calcium alginate, 6x6 gentle foam border dressing  CT      Assessment:         ICD-10-CM ICD-9-CM   1. Non-pressure chronic ulcer of left lower leg with fat layer exposed  L97.922 707.10   2. Non-pressure chronic ulcer of right lower leg with fat layer exposed  L97.912 707.10   3. 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     Consent Done?:  Yes (Verbal)  Local anesthesia used?: No       Wound Details:    Location:  Left leg    Type of Debridement:  Non-excisional       Length (cm):  7.1       Area (sq cm):  46.86       Width (cm):  6.6       Percent Debrided (%):  100       Depth (cm):  0.3       Total Area Debrided (sq cm):  46.86    Depth of debridement:  Epidermis/Dermis    Tissue debrided:  Epidermis    Devitalized tissue debrided:  " Biofilm, Callus, Exudate, Fibrin and Slough    Instruments:  Ultrasound Probe (Rosado probe)      CT     Excisional debridement performed:          [] Yes [x] No   Selective debridement performed:           [x] Yes [] No Ultrasonic debridement  Mechanical debridement performed:        [] Yes [x] No   Silver nitrate applied:                                  [] Yes [x] No   Labs ordered this visit:                                [] Yes [x] No   Imaging ordered this visit:                          [] Yes [x] No   Tissue pathology and/or culture taken:    [] Yes [x] No      MEDICATIONS    Current Outpatient Medications:     acetaminophen (TYLENOL) suppository, Place 325 mg rectally every 4 (four) hours as needed for Temperature greater than., Disp: , Rfl:     amLODIPine (NORVASC) 10 MG tablet, , Disp: , Rfl:     calcitRIOL (ROCALTROL) 0.5 MCG Cap, Take 1 capsule by mouth 2 (two) times a day., Disp: , Rfl:     carvediloL (COREG) 12.5 MG tablet, Take 12.5 mg by mouth 2 (two) times daily., Disp: , Rfl:     clopidogreL (PLAVIX) 75 mg tablet, Take 75 mg by mouth once daily., Disp: , Rfl:     FLUoxetine 20 MG capsule, Take 20 mg by mouth once daily., Disp: , Rfl:     furosemide (LASIX) 20 MG tablet, Take 60 mg by mouth once daily., Disp: , Rfl:     midodrine (PROAMATINE) 5 MG Tab, Take 5 mg by mouth. Give 2 tabs (10mg) po TID PRN for BP <110, Disp: , Rfl:     ondansetron (ZOFRAN) 4 MG tablet, Take 4 mg by mouth every 8 (eight) hours as needed for Nausea., Disp: , Rfl:     pantoprazole (PROTONIX) 40 MG tablet, Take 40 mg by mouth once daily at 6am., Disp: , Rfl:     SANTYL ointment, Apply 1 application topically once daily at 6am., Disp: , Rfl:     sodium bicarbonate 650 MG tablet, Take 650 mg by mouth 2 (two) times daily., Disp: , Rfl:     traMADoL (ULTRAM) 50 mg tablet, Take 50 mg by mouth every 6 (six) hours as needed., Disp: , Rfl:     valsartan (DIOVAN) 160 MG tablet, Take 160 mg by mouth 2 (two) times daily., Disp: , Rfl:      VELTASSA 8.4 gram PwPk, Take 1 packet by mouth., Disp: , Rfl:  Review of patient's allergies indicates:  No Known Allergies      HOME [facility]ALTH [facility]Y:  [facility]  TIMES PER WEEK/DAYS:  1 x weekly, Friday  WOUND CARE ORDERS:  Left lateral lower leg wound: Cleanse with normal saline/vinegar mixture, let solution sit on wound x 5 minutes, rinse with normal saline; apply santyl (nickel thick), mesalt, calcium alginate, 6x6 gentle foam border dressing to be changed daily  Right medial lower leg wound: Cleanse with wound cleanser, apply betadine, 4x4 gentle foam border dressing to be changed every other day  Left posterior thigh wound: **Do not remove current dressing, endoform in place** This will be changed only when patient comes to wound care, if the dressing were to fall off, can apply silver alginate.       Follow up in 1 week (on 10/26/2022).        Electronically signed:  Bonnie Portillo NP

## 2022-10-19 NOTE — PROCEDURES
"Debridement    Date/Time: 10/19/2022 11:00 AM  Performed by: Bonnie Portillo NP  Authorized by: Bonnie Portillo NP     Time out: Immediately prior to procedure a "time out" was called to verify the correct patient, procedure, equipment, support staff and site/side marked as required.    Consent Done?:  Yes (Verbal)  Local anesthesia used?: No      Wound Details:    Location:  Left leg    Type of Debridement:  Non-excisional       Length (cm):  7.1       Area (sq cm):  46.86       Width (cm):  6.6       Percent Debrided (%):  100       Depth (cm):  0.3       Total Area Debrided (sq cm):  46.86    Depth of debridement:  Epidermis/Dermis    Tissue debrided:  Epidermis    Devitalized tissue debrided:  Biofilm, Callus, Exudate, Fibrin and Slough    Instruments:  Ultrasound Probe (Rosado probe)     CT  "

## 2022-10-26 ENCOUNTER — HOSPITAL ENCOUNTER (OUTPATIENT)
Dept: WOUND CARE | Facility: HOSPITAL | Age: 82
Discharge: HOME | End: 2022-10-26
Attending: NURSE PRACTITIONER
Payer: MEDICARE

## 2022-10-26 VITALS
SYSTOLIC BLOOD PRESSURE: 164 MMHG | WEIGHT: 260 LBS | BODY MASS INDEX: 35.21 KG/M2 | RESPIRATION RATE: 18 BRPM | HEART RATE: 56 BPM | HEIGHT: 72 IN | DIASTOLIC BLOOD PRESSURE: 72 MMHG

## 2022-10-26 DIAGNOSIS — L97.922 NON-PRESSURE CHRONIC ULCER OF LEFT LOWER LEG WITH FAT LAYER EXPOSED: Primary | ICD-10-CM

## 2022-10-26 DIAGNOSIS — L97.919 IDIOPATHIC CHRONIC VENOUS HYPERTENSION OF RIGHT LOWER EXTREMITY WITH ULCER: ICD-10-CM

## 2022-10-26 DIAGNOSIS — I87.312 IDIOPATHIC CHRONIC VENOUS HYPERTENSION OF LEFT LOWER EXTREMITY WITH ULCER: ICD-10-CM

## 2022-10-26 DIAGNOSIS — I87.311 IDIOPATHIC CHRONIC VENOUS HYPERTENSION OF RIGHT LOWER EXTREMITY WITH ULCER: ICD-10-CM

## 2022-10-26 DIAGNOSIS — L97.929 IDIOPATHIC CHRONIC VENOUS HYPERTENSION OF LEFT LOWER EXTREMITY WITH ULCER: ICD-10-CM

## 2022-10-26 DIAGNOSIS — L97.121 NON-PRESSURE CHRONIC ULCER OF LEFT THIGH LIMITED TO BREAKDOWN OF SKIN: ICD-10-CM

## 2022-10-26 PROCEDURE — 99213 OFFICE O/P EST LOW 20 MIN: CPT | Mod: 25

## 2022-10-26 PROCEDURE — 27000999 HC MEDICAL RECORD PHOTO DOCUMENTATION

## 2022-10-26 PROCEDURE — 97597 DBRDMT OPN WND 1ST 20 CM/<: CPT

## 2022-10-26 NOTE — PROGRESS NOTES
Subjective:       Patient ID: Jil Graves is a 82 y.o. female.    Chief Complaint: Venous Ulcer (Left lateral lower leg /Left medial lower leg) and Wound Care (Left posterior thigh - skin tear)    HPI     HOLD TheraSkin application    Ms. Graves returns today for follow up for bilateral lower extremity venous ulcers.     The wound on the left lower extremity have been doing very well and we had authorized Theraskin grafting to begin.  However, last week, the wound began to deteriorate again.  It was ultrasonically debrided as there was a thick layer of slough over the wound bed.  This week, the wound continues to deteriorate.  It was noted that the patient has bilateral lower extremity 3+ pitting edema.  We discussed her current dosage of furosemide, and she was very confused.  Her daughter and sister law with both present.  We had a difficult time understanding what her dose is versus what she is actually taking.  This dosage has changed multiple times since she has been in and out of rehab and it is likely that she is not taking the correct dosage.  In addition to the wound on the left lower extremity, immediately above and adjacent to the wound there were multiple fluid filled blisters that were not opened.  The wound was selectively debrided and we will have to hold the Theraskin graft approval for the time being.  The exudate was cultured and the patient was strongly advised to continue washing her leg with the acetic acid/normal saline mix as she has been doing since the last visit.  However, she was reminded to allow the leg to dry thoroughly prior to wrapping it as it does appear to be macerated as well as infected.      She was advised to reach out to her primary care provider, Dr. Dai, again to discuss her Lasix dosage and to discuss a possible increase.  She denies shortness of breath, however, she did later state that she felt bad.  Upon her leaving the clinic, the daughter and sister law made  the decision that she would go to ER rather than to Dr. Dai' office due to the extreme fluid overload.  Upon review of the ER note, the family told ER that wound care had sent her there for lab work to check her renal function.  The discussion with the patient at wound clinic had been that based on the culture we may need to adjust the dosage based on her renal function, and that Dr. Dai would run her test for her.  She had mentioned previously that while at rehab she had been told that she had a level of renal failure. Today after review of the ER note, she does have an eGFR of 29 (Stage 4 renal disease), BUN 37.0.  The ER physician gave an Rx for Doxycycline.  Venous U/S were also ordered which show an occluding thrombus formation at the right superficial femoral and popliteal veins (wound is on left leg)    The right lower extremity is pin point size at this point.  We will continue using Betadine just to keep the area dry with a simple dry dressing over.    She does have the wound on the back of the left thigh that has made excellent progress.      Review of Systems   Musculoskeletal:  Positive for gait problem.   Skin:  Positive for wound.   All other systems reviewed and are negative.      Objective:      Vitals:    10/26/22 1204   BP: (!) 164/72   Pulse: (!) 56   Resp: 18       Physical Exam  Vitals reviewed. Exam conducted with a chaperone present (Daughter).   Constitutional:       Appearance: She is obese.   HENT:      Head: Normocephalic.   Eyes:      Extraocular Movements: Extraocular movements intact.      Pupils: Pupils are equal, round, and reactive to light.   Cardiovascular:      Rate and Rhythm: Normal rate.      Pulses: Normal pulses.   Pulmonary:      Effort: Pulmonary effort is normal.      Comments: SOB with exertion  Musculoskeletal:      Right lower leg: Edema present.      Left lower leg: Edema present.      Comments: Edema is improving.  Ambulation difficult due to knee pain.     Skin:     General: Skin is warm and dry.      Findings: Wound present.      Comments: Bilateral lower extremity wounds.  Callus to left 2nd toe.   Neurological:      General: No focal deficit present.      Mental Status: She is alert and oriented to person, place, and time.   Psychiatric:         Mood and Affect: Mood normal.          Altered Skin Integrity 09/14/22 1205 Left posterior Thigh #5 Skin Tear Partial thickness tissue loss. Shallow open ulcer with a red or pink wound bed, without slough. Intact or Open/Ruptured Serum-filled blister. (Active)   09/14/22 1205   Altered Skin Integrity Present on Admission: yes   Side: Left   Orientation: posterior   Location: Thigh   Wound Number: #5   Is this injury device related?: Yes   Primary Wound Type: Skin tear   Description of Altered Skin Integrity: Partial thickness tissue loss. Shallow open ulcer with a red or pink wound bed, without slough. Intact or Open/Ruptured Serum-filled blister.   Ankle-Brachial Index:    Pulses:    Removal Indication and Assessment:    Wound Outcome:    (Retired) Wound Length (cm):    (Retired) Wound Width (cm):    (Retired) Depth (cm):    Wound Description (Comments):    Removal Indications:    Dressing Appearance Open to air 10/26/22 1205   Drainage Amount None 10/26/22 1205   Drainage Characteristics/Odor Serosanguineous 10/26/22 1205   Appearance Intact 10/26/22 1205   Tissue loss description Full thickness 10/26/22 1205   Periwound Area Intact 10/26/22 1205   Wound Edges Open 10/26/22 1205   Wound Length (cm) 0.3 cm 10/26/22 1205   Wound Width (cm) 0.3 cm 10/26/22 1205   Wound Depth (cm) 0.2 cm 10/26/22 1205   Wound Volume (cm^3) 0.018 cm^3 10/26/22 1205   Wound Surface Area (cm^2) 0.09 cm^2 10/26/22 1205   Care Cleansed with:;Wound cleanser 10/26/22 1205   Dressing Applied;Other (comment) 10/26/22 1205   Dressing Change Due 10/28/22 10/26/22 1205            Wound 05/11/22 1157 Venous Ulcer Left lower;lateral Leg #1 (Active)    05/11/22 1157    Pre-existing:    Primary Wound Type: Venous ulcer   Side: Left   Orientation: lower;lateral   Location: Leg   Wound Number: #1   Ankle-Brachial Index:    Pulses:    Removal Indication and Assessment:    Wound Outcome:    (Retired) Wound Type:    (Retired) Wound Length (cm):    (Retired) Wound Width (cm):    (Retired) Depth (cm):    Wound Description (Comments):    Removal Indications:    Dressing Appearance Moist drainage 10/26/22 1205   Drainage Amount Moderate 10/26/22 1205   Drainage Characteristics/Odor Serosanguineous;Purulent 10/26/22 1205   Appearance Slough;Necrotic;Fibrin;Moist 10/26/22 1205   Tissue loss description Full thickness 10/26/22 1205   Periwound Area Intact 10/26/22 1205   Wound Edges Open 10/26/22 1205   Wound Length (cm) 7.3 cm 10/26/22 1205   Wound Width (cm) 7.5 cm 10/26/22 1205   Wound Depth (cm) 0.3 cm 10/26/22 1205   Wound Volume (cm^3) 16.425 cm^3 10/26/22 1205   Wound Surface Area (cm^2) 54.75 cm^2 10/26/22 1205   Care Cleansed with:;Wound cleanser 10/26/22 1205   Dressing Applied;Other (comment) 10/26/22 1205   Dressing Change Due 10/27/22 10/26/22 1205            (Retired) Wound Venous Ulcer Right lower;medial Leg #2 (Active)        Pre-existing: Yes   Primary Wound Type: Venous ulcer   Side: Right   Orientation: lower;medial   Location: Leg   Wound Number: #2   Ankle-Brachial Index:    Pulses:    Removal Indication and Assessment:    Wound Outcome:    (Retired) Wound Type:    (Retired) Wound Length (cm):    (Retired) Wound Width (cm):    (Retired) Depth (cm):    Wound Description (Comments):    Removal Indications:    Dressing Appearance Moist drainage 10/26/22 1205   Drainage Amount Moderate 10/26/22 1205   Drainage Characteristics/Odor Serosanguineous 10/26/22 1205   Appearance Intact;Moist 10/26/22 1205   Tissue loss description Full thickness 10/26/22 1205   Periwound Area Intact 10/26/22 1205   Wound Edges Open 10/26/22 1205   Wound Length (cm) 0.2 cm 10/26/22  "1205   Wound Width (cm) 0.2 cm 10/26/22 1205   Wound Depth (cm) 0.2 cm 10/26/22 1205   Wound Volume (cm^3) 0.008 cm^3 10/26/22 1205   Wound Surface Area (cm^2) 0.04 cm^2 10/26/22 1205   Dressing Applied;Other (comment) 10/26/22 1205   Dressing Change Due 10/27/22 10/26/22 1205               Right lower leg      Left posterior thigh (healed)  silver alginate, 4x4 gauze, kerlix, coban  Bandage              Left lower leg, pre     No post photo   silver alginate, 4x4 gauze, kerlix, coban  CT      Assessment:         ICD-10-CM ICD-9-CM   1. Non-pressure chronic ulcer of left lower leg with fat layer exposed  L97.922 707.10   2. Non-pressure chronic ulcer of left thigh limited to breakdown of skin  L97.121 707.11   3. Idiopathic chronic venous hypertension of right lower extremity with ulcer  I87.311 459.31    L97.919    4. Idiopathic chronic venous hypertension of left lower extremity with ulcer  I87.312 459.31    L97.929            Procedures:     Debridement     Date/Time: 10/26/2022 11:00 AM  Performed by: Bonnie Portillo NP  Authorized by: Bonnie Portillo NP      Time out: Immediately prior to procedure a "time out" was called to verify the correct patient, procedure, equipment, support staff and site/side marked as required.     Consent Done?:  Yes (Verbal)  Local anesthesia used?: No       Wound Details:    Location:  Left leg    Type of Debridement:  Non-excisional       Length (cm):  7.3       Area (sq cm):  54.75       Width (cm):  7.5       Percent Debrided (%):  50       Depth (cm):  0.3       Total Area Debrided (sq cm):  27.38    Depth of debridement:  Epidermis/Dermis    Devitalized tissue debrided:  Biofilm, Exudate, Fibrin and Slough    Instruments:  Other ([place])      CT     Excisional debridement performed:          [] Yes [x] No   Selective debridement performed:           [x] Yes [] No   Mechanical debridement performed:        [] Yes [x] No   Silver nitrate applied:                               "    [] Yes [x] No   Labs ordered this visit:                                [] Yes [x] No   Imaging ordered this visit:                          [] Yes [x] No   Tissue pathology and/or culture taken:    [] Yes [x] No      MEDICATIONS    Current Outpatient Medications:     acetaminophen (TYLENOL) suppository, Place 325 mg rectally every 4 (four) hours as needed for Temperature greater than., Disp: , Rfl:     amLODIPine (NORVASC) 10 MG tablet, , Disp: , Rfl:     calcitRIOL (ROCALTROL) 0.5 MCG Cap, Take 1 capsule by mouth 2 (two) times a day., Disp: , Rfl:     carvediloL (COREG) 12.5 MG tablet, Take 12.5 mg by mouth 2 (two) times daily., Disp: , Rfl:     clopidogreL (PLAVIX) 75 mg tablet, Take 75 mg by mouth once daily., Disp: , Rfl:     FLUoxetine 20 MG capsule, Take 20 mg by mouth once daily., Disp: , Rfl:     furosemide (LASIX) 20 MG tablet, Take 60 mg by mouth once daily., Disp: , Rfl:     midodrine (PROAMATINE) 5 MG Tab, Take 5 mg by mouth. Give 2 tabs (10mg) po TID PRN for BP <110, Disp: , Rfl:     ondansetron (ZOFRAN) 4 MG tablet, Take 4 mg by mouth every 8 (eight) hours as needed for Nausea., Disp: , Rfl:     pantoprazole (PROTONIX) 40 MG tablet, Take 40 mg by mouth once daily at 6am., Disp: , Rfl:     SANTYL ointment, Apply 1 application topically once daily at 6am., Disp: , Rfl:     sodium bicarbonate 650 MG tablet, Take 650 mg by mouth 2 (two) times daily., Disp: , Rfl:     traMADoL (ULTRAM) 50 mg tablet, Take 50 mg by mouth every 6 (six) hours as needed., Disp: , Rfl:     valsartan (DIOVAN) 160 MG tablet, Take 160 mg by mouth 2 (two) times daily., Disp: , Rfl:     VELTASSA 8.4 gram PwPk, Take 1 packet by mouth., Disp: , Rfl:     apixaban (ELIQUIS DVT-PE TREAT 30D START) 5 mg (74 tabs) DsPk, For the first 7 days take two 5 mg tablets twice daily.  After 7 days take one 5 mg tablet twice daily., Disp: 74 tablet, Rfl: 0    doxycycline (VIBRAMYCIN) 100 MG Cap, Take 1 capsule (100 mg total) by mouth every 12  (twelve) hours. for 10 days, Disp: 20 capsule, Rfl: 0 Review of patient's allergies indicates:  No Known Allergies      HOME [facility]ALTH [facility]:  [facility]  TIMES PER WEEK/DAYS:  1 x weekly, Friday  WOUND CARE ORDERS:  Left lower lateral lower wound: Cleanse with normal saline/vinegar mixture, let solution sit on wound x 5 minutes, rinse with normal saline; apply silver alginate, 4x4 gauze, kerlix, and coban to be changed daily  Right medial lower leg wound: Cleanse with wound cleanser, apply silver alginate, 4x4 gauze, kerlix, and coban to be changed daily  Left posterior thigh wound: Cleanse with wound cleanser, apply bandage PRN     Follow up in 1 week (on 11/2/2022).        Electronically signed:  Bonnie Portillo NP

## 2022-10-27 NOTE — PROCEDURES
"Debridement    Date/Time: 10/26/2022 11:00 AM  Performed by: Bonnie Portillo NP  Authorized by: Bonnie Portillo NP     Time out: Immediately prior to procedure a "time out" was called to verify the correct patient, procedure, equipment, support staff and site/side marked as required.    Consent Done?:  Yes (Verbal)  Local anesthesia used?: No      Wound Details:    Location:  Left leg    Type of Debridement:  Non-excisional       Length (cm):  7.3       Area (sq cm):  54.75       Width (cm):  7.5       Percent Debrided (%):  50       Depth (cm):  0.3       Total Area Debrided (sq cm):  27.38    Depth of debridement:  Epidermis/Dermis    Devitalized tissue debrided:  Biofilm, Exudate, Fibrin and Slough    Instruments:  Other ([place])     CT  "

## 2022-11-02 ENCOUNTER — HOSPITAL ENCOUNTER (OUTPATIENT)
Dept: WOUND CARE | Facility: HOSPITAL | Age: 82
Discharge: HOME | End: 2022-11-02
Attending: NURSE PRACTITIONER
Payer: MEDICARE

## 2022-11-02 VITALS
HEIGHT: 72 IN | DIASTOLIC BLOOD PRESSURE: 72 MMHG | RESPIRATION RATE: 18 BRPM | HEART RATE: 61 BPM | BODY MASS INDEX: 35.89 KG/M2 | WEIGHT: 265 LBS | SYSTOLIC BLOOD PRESSURE: 124 MMHG

## 2022-11-02 DIAGNOSIS — L97.121 NON-PRESSURE CHRONIC ULCER OF LEFT THIGH LIMITED TO BREAKDOWN OF SKIN: ICD-10-CM

## 2022-11-02 DIAGNOSIS — I87.311 IDIOPATHIC CHRONIC VENOUS HYPERTENSION OF RIGHT LOWER EXTREMITY WITH ULCER: ICD-10-CM

## 2022-11-02 DIAGNOSIS — L97.929 IDIOPATHIC CHRONIC VENOUS HYPERTENSION OF LEFT LOWER EXTREMITY WITH ULCER: ICD-10-CM

## 2022-11-02 DIAGNOSIS — L97.912 NON-PRESSURE CHRONIC ULCER OF RIGHT LOWER LEG WITH FAT LAYER EXPOSED: ICD-10-CM

## 2022-11-02 DIAGNOSIS — I87.312 IDIOPATHIC CHRONIC VENOUS HYPERTENSION OF LEFT LOWER EXTREMITY WITH ULCER: ICD-10-CM

## 2022-11-02 DIAGNOSIS — L97.919 IDIOPATHIC CHRONIC VENOUS HYPERTENSION OF RIGHT LOWER EXTREMITY WITH ULCER: ICD-10-CM

## 2022-11-02 DIAGNOSIS — L97.922 NON-PRESSURE CHRONIC ULCER OF LEFT LOWER LEG WITH FAT LAYER EXPOSED: Primary | ICD-10-CM

## 2022-11-02 PROCEDURE — 99213 OFFICE O/P EST LOW 20 MIN: CPT

## 2022-11-02 PROCEDURE — 27000999 HC MEDICAL RECORD PHOTO DOCUMENTATION

## 2022-11-02 NOTE — PROGRESS NOTES
Subjective:       Patient ID: Jil Graves is a 82 y.o. female.    Chief Complaint: Venous Ulcer (Left lateral lower leg /Left medial lower leg) and Wound Care (Left posterior thigh )    HPI     HOLD TheraSkin application    Ms. Graves returns today for follow up for bilateral lower extremity venous ulcers.     The wound on the left lower extremity had been doing very well and we had authorized Theraskin grafting to begin.  However, two weeks prior the wound began to deteriorate again.  It was ultrasonically debrided as there was a thick layer of slough over the wound bed.  Last week, the wound continued to deteriorate.  It was noted that the patient had bilateral lower extremity 3+ pitting edema.  We discussed her current dosage of furosemide, and she was very confused.  Her daughter and sister law with both present.  We had a difficult time understanding what her dose was versus what she is actually taking.  That dosage has changed multiple times since she has been in and out of rehab and it is likely that she has not been taking the correct dosage.  In addition to the wound on the left lower extremity, immediately above and adjacent to the wound there were multiple fluid filled blisters that were not opened.  The wound was selectively debrided and the Theraskin grafting was postponed due to infection.   The exudate was cultured and the patient was strongly advised to continue washing her leg with the acetic acid/normal saline mix as she has been doing since the last visit.  However, she was reminded to allow the leg to dry thoroughly prior to wrapping it as it does appear to be macerated as well as infected.      She was advised to reach out to her primary care provider, Dr. Dai, again to discuss her Lasix dosage and to discuss a possible increase.  She denies shortness of breath, however, she did later state that she felt bad.  Upon her leaving the clinic, the daughter and sister law made the decision  that she would go to ER rather than to Dr. Dai' office due to the extreme fluid overload.  Upon review of the ER note, the family told ER that wound care had sent her there for lab work to check her renal function.  The discussion with the patient at wound clinic had been that based on the culture we may need to adjust the dosage based on her renal function, and that Dr. Dai would run her test for her.  She had mentioned previously that while at rehab she had been told that she had a level of renal failure. Today after review of the ER note, she does have an eGFR of 29 (Stage 4 renal disease), BUN 37.0.  The ER physician gave an Rx for Doxycycline.  Venous U/S were also ordered which show an occluding thrombus formation at the right superficial femoral and popliteal veins (wound is on left leg)    Today she returns for assessment the wound on the left lateral leg.  There is some slight improvement with the use of doxycycline.  The recommended antibiotics is levofloxacin based on her culture results from last week.  However, since the wound is making some progress with the doxycycline we will leave her on it and reassess at the next visit with the possibility of changing.  There is 7 bacteria in the wound at this time so the doxycycline will be beneficial for some.  Additionally, today she is receiving her topical antibiotics from [facility] (Piperacillin/Tazobactam) and she will begin using those daily both on the left and the right wounds due to the likelihood of cross contamination.      Of note, the patient does still have 2+ pitting edema.  It was noted that at her ER visit on 10/26/2022 she did have the 2 blood clots in the right leg.  She was instructed by the ER provider to stop taking the Plavix and then to begin taking adequate for 2 weeks.  After discussion today it was strongly recommended that she followup with Dr. Coburn for cardiovascular review and consult due to the remaining edema that she does have  into verify that she should stop taking Plavix and/or begin taking Eliquis.  The daughter was going to see Dr. Coburn for his recommendations.      Review of Systems   Musculoskeletal:  Positive for gait problem.   Skin:  Positive for wound.   All other systems reviewed and are negative.      Objective:      Vitals:    11/02/22 1150   BP: 124/72   Pulse: 61   Resp: 18       Physical Exam  Vitals reviewed. Exam conducted with a chaperone present (Daughter).   Constitutional:       Appearance: She is obese.   HENT:      Head: Normocephalic.   Eyes:      Extraocular Movements: Extraocular movements intact.      Pupils: Pupils are equal, round, and reactive to light.   Cardiovascular:      Rate and Rhythm: Normal rate.      Pulses: Normal pulses.   Pulmonary:      Effort: Pulmonary effort is normal.      Comments: SOB with exertion  Musculoskeletal:      Right lower leg: Edema present.      Left lower leg: Edema present.      Comments: Edema is improving.  Ambulation difficult due to knee pain.    Skin:     General: Skin is warm and dry.      Findings: Wound present.      Comments: Bilateral lower extremity wounds.  Callus to left 2nd toe.   Neurological:      General: No focal deficit present.      Mental Status: She is alert and oriented to person, place, and time.   Psychiatric:         Mood and Affect: Mood normal.          Altered Skin Integrity 09/14/22 1205 Left posterior Thigh #5 Skin Tear Partial thickness tissue loss. Shallow open ulcer with a red or pink wound bed, without slough. Intact or Open/Ruptured Serum-filled blister. (Active)   09/14/22 1205   Altered Skin Integrity Present on Admission: yes   Side: Left   Orientation: posterior   Location: Thigh   Wound Number: #5   Is this injury device related?: Yes   Primary Wound Type: Skin tear   Description of Altered Skin Integrity: Partial thickness tissue loss. Shallow open ulcer with a red or pink wound bed, without slough. Intact or Open/Ruptured  Serum-filled blister.   Ankle-Brachial Index:    Pulses:    Removal Indication and Assessment:    Wound Outcome:    (Retired) Wound Length (cm):    (Retired) Wound Width (cm):    (Retired) Depth (cm):    Wound Description (Comments):    Removal Indications:    Dressing Appearance Open to air 11/02/22 1228   Drainage Amount None 11/02/22 1228   Appearance Pink 11/02/22 1228   Tissue loss description Partial thickness 11/02/22 1228   Wound Edges Open 11/02/22 1228   Wound Length (cm) 0.2 cm 11/02/22 1228   Wound Width (cm) 0.2 cm 11/02/22 1228   Wound Depth (cm) 0.1 cm 11/02/22 1228   Wound Volume (cm^3) 0.004 cm^3 11/02/22 1228   Wound Surface Area (cm^2) 0.04 cm^2 11/02/22 1228   Dressing Applied 11/02/22 1303            Wound 05/11/22 1157 Venous Ulcer Left lower;lateral Leg #1 (Active)   05/11/22 1157    Pre-existing:    Primary Wound Type: Venous ulcer   Side: Left   Orientation: lower;lateral   Location: Leg   Wound Number: #1   Ankle-Brachial Index:    Pulses:    Removal Indication and Assessment:    Wound Outcome:    (Retired) Wound Type:    (Retired) Wound Length (cm):    (Retired) Wound Width (cm):    (Retired) Depth (cm):    Wound Description (Comments):    Removal Indications:    Dressing Appearance Moist drainage 11/02/22 1228   Drainage Amount Large 11/02/22 1228   Drainage Characteristics/Odor Serosanguineous 11/02/22 1228   Appearance Moist;Tan;Slough;Hypergranulation 11/02/22 1228   Tissue loss description Full thickness 11/02/22 1228   Periwound Area Intact;Moist 11/02/22 1228   Wound Edges Open 11/02/22 1228   Wound Length (cm) 7.5 cm 11/02/22 1228   Wound Width (cm) 7.9 cm 11/02/22 1228   Wound Depth (cm) 0.3 cm 11/02/22 1228   Wound Volume (cm^3) 17.775 cm^3 11/02/22 1228   Wound Surface Area (cm^2) 59.25 cm^2 11/02/22 1228   Care Cleansed with:;Wound cleanser 11/02/22 1228   Dressing Applied 11/02/22 1303            (Retired) Wound Venous Ulcer Right lower;medial Leg #2 (Active)         Pre-existing: Yes   Primary Wound Type: Venous ulcer   Side: Right   Orientation: lower;medial   Location: Leg   Wound Number: #2   Ankle-Brachial Index:    Pulses:    Removal Indication and Assessment:    Wound Outcome:    (Retired) Wound Type:    (Retired) Wound Length (cm):    (Retired) Wound Width (cm):    (Retired) Depth (cm):    Wound Description (Comments):    Removal Indications:    Dressing Appearance Moist drainage 11/02/22 1228   Drainage Amount Moderate 11/02/22 1228   Drainage Characteristics/Odor Serosanguineous 11/02/22 1228   Appearance Slough;Moist;Pink;Granulating 11/02/22 1228   Tissue loss description Full thickness 11/02/22 1228   Periwound Area Intact;Moist 11/02/22 1228   Wound Edges Open 11/02/22 1228   Wound Length (cm) 1.2 cm 11/02/22 1228   Wound Width (cm) 3 cm 11/02/22 1228   Wound Depth (cm) 0.2 cm 11/02/22 1228   Wound Volume (cm^3) 0.72 cm^3 11/02/22 1228   Wound Surface Area (cm^2) 3.6 cm^2 11/02/22 1228   Care Cleansed with:;Wound cleanser 11/02/22 1228   Dressing Applied 11/02/22 1303             Right lower leg      Left posterior thigh (healed)  topical abx, santyl, gentle foam dressing  [place] Dressing          Left lower leg - no debridement  topical abx, santyl, 4x4 gauze, ABD pad, kerlix, coban  CT      Assessment:         ICD-10-CM ICD-9-CM   1. Non-pressure chronic ulcer of left lower leg with fat layer exposed  L97.922 707.10   2. Non-pressure chronic ulcer of right lower leg with fat layer exposed  L97.912 707.10   3. 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 Idiopathic chronic venous hypertension of right lower extremity with ulcer  I87.311 459.31    L97.919    5. Idiopathic chronic venous hypertension of left lower extremity with ulcer  I87.312 459.31    L97.929            Procedures:     No debridement performed.      Excisional debridement performed:          [] Yes [x] No   Selective debridement performed:            [] Yes [x] No   Mechanical debridement performed:        [] Yes [x] No   Silver nitrate applied:                                  [] Yes [x] No   Labs ordered this visit:                                [] Yes [x] No   Imaging ordered this visit:                          [] Yes [x] No   Tissue pathology and/or culture taken:    [] Yes [x] No      MEDICATIONS    Current Outpatient Medications:     acetaminophen (TYLENOL) suppository, Place 325 mg rectally every 4 (four) hours as needed for Temperature greater than., Disp: , Rfl:     amLODIPine (NORVASC) 10 MG tablet, , Disp: , Rfl:     apixaban (ELIQUIS DVT-PE TREAT 30D START) 5 mg (74 tabs) DsPk, For the first 7 days take two 5 mg tablets twice daily.  After 7 days take one 5 mg tablet twice daily., Disp: 74 tablet, Rfl: 0    calcitRIOL (ROCALTROL) 0.5 MCG Cap, Take 1 capsule by mouth 2 (two) times a day., Disp: , Rfl:     carvediloL (COREG) 12.5 MG tablet, Take 12.5 mg by mouth 2 (two) times daily., Disp: , Rfl:     doxycycline (VIBRAMYCIN) 100 MG Cap, Take 1 capsule (100 mg total) by mouth every 12 (twelve) hours. for 10 days, Disp: 20 capsule, Rfl: 0    FLUoxetine 20 MG capsule, Take 20 mg by mouth once daily., Disp: , Rfl:     furosemide (LASIX) 20 MG tablet, Take 60 mg by mouth once daily., Disp: , Rfl:     midodrine (PROAMATINE) 5 MG Tab, Take 5 mg by mouth. Give 2 tabs (10mg) po TID PRN for BP <110, Disp: , Rfl:     ondansetron (ZOFRAN) 4 MG tablet, Take 4 mg by mouth every 8 (eight) hours as needed for Nausea., Disp: , Rfl:     pantoprazole (PROTONIX) 40 MG tablet, Take 40 mg by mouth once daily at 6am., Disp: , Rfl:     SANTYL ointment, Apply 1 application topically once daily at 6am., Disp: , Rfl:     sodium bicarbonate 650 MG tablet, Take 650 mg by mouth 2 (two) times daily., Disp: , Rfl:     traMADoL (ULTRAM) 50 mg tablet, Take 50 mg by mouth every 6 (six) hours as needed., Disp: , Rfl:     valsartan (DIOVAN) 160 MG tablet, Take 160 mg by mouth 2 (two)  times daily., Disp: , Rfl:     VELTASSA 8.4 gram PwPk, Take 1 packet by mouth., Disp: , Rfl:     clopidogreL (PLAVIX) 75 mg tablet, Take 75 mg by mouth once daily., Disp: , Rfl:  Review of patient's allergies indicates:  No Known Allergies      HOME HEALTH AGENCY:  [facility]  TIMES PER WEEK/DAYS:  1 x weekly, Friday  WOUND CARE ORDERS:    Right medial lower leg wound: Cleanse with wound cleanser and apply topical antibiotic/santyl mix to the wound bed and cover with a gentle foam border dressing - to be changed daily.   Left lateral lower leg wound: Cleanse with wound cleanser and apply topical antibiotic/santyl mix to the wound bed, cover with 4x4 gauze and ABD pad, wrap leg (knee to toe) in kerlix and coban - to be changed daily.   Left posterior thigh wound: Cleanse with wound cleanser, dry well and apply band aid - changing as needed.     Follow up in about 1 week (around 11/9/2022) for bilateral lower legs - venous ulcers.        Electronically signed:  Bonnie Portillo NP

## 2024-07-31 ENCOUNTER — HOSPITAL ENCOUNTER (OUTPATIENT)
Facility: HOSPITAL | Age: 84
Discharge: HOME HEALTH | End: 2024-08-01
Attending: INTERNAL MEDICINE | Admitting: EMERGENCY MEDICINE
Payer: MEDICARE

## 2024-07-31 DIAGNOSIS — R60.0 PERIPHERAL EDEMA: ICD-10-CM

## 2024-07-31 DIAGNOSIS — R06.00 DYSPNEA: ICD-10-CM

## 2024-07-31 DIAGNOSIS — N17.9 AKI (ACUTE KIDNEY INJURY): Primary | ICD-10-CM

## 2024-07-31 DIAGNOSIS — N17.9 ACUTE RENAL FAILURE, UNSPECIFIED ACUTE RENAL FAILURE TYPE: ICD-10-CM

## 2024-07-31 LAB
ALBUMIN SERPL-MCNC: 2.6 G/DL (ref 3.4–4.8)
ALBUMIN/GLOB SERPL: 0.4 RATIO (ref 1.1–2)
ALP SERPL-CCNC: 89 UNIT/L (ref 40–150)
ALT SERPL-CCNC: 13 UNIT/L (ref 0–55)
ANION GAP SERPL CALC-SCNC: 11 MEQ/L
AST SERPL-CCNC: 15 UNIT/L (ref 5–34)
BACTERIA #/AREA URNS AUTO: ABNORMAL /HPF
BASOPHILS # BLD AUTO: 0.05 X10(3)/MCL
BASOPHILS NFR BLD AUTO: 0.5 %
BILIRUB SERPL-MCNC: 0.4 MG/DL
BILIRUB UR QL STRIP.AUTO: NEGATIVE
BNP BLD-MCNC: 462 PG/ML
BUN SERPL-MCNC: 49 MG/DL (ref 9.8–20.1)
CALCIUM SERPL-MCNC: 9.4 MG/DL (ref 8.4–10.2)
CHLORIDE SERPL-SCNC: 112 MMOL/L (ref 98–107)
CLARITY UR: CLEAR
CO2 SERPL-SCNC: 19 MMOL/L (ref 23–31)
COLOR UR AUTO: YELLOW
CREAT SERPL-MCNC: 3.22 MG/DL (ref 0.55–1.02)
CREAT/UREA NIT SERPL: 15
EOSINOPHIL # BLD AUTO: 0.49 X10(3)/MCL (ref 0–0.9)
EOSINOPHIL NFR BLD AUTO: 4.5 %
ERYTHROCYTE [DISTWIDTH] IN BLOOD BY AUTOMATED COUNT: 15.5 % (ref 11.5–17)
FLUAV AG UPPER RESP QL IA.RAPID: NOT DETECTED
FLUBV AG UPPER RESP QL IA.RAPID: NOT DETECTED
GFR SERPLBLD CREATININE-BSD FMLA CKD-EPI: 14 ML/MIN/1.73/M2
GLOBULIN SER-MCNC: 5.9 GM/DL (ref 2.4–3.5)
GLUCOSE SERPL-MCNC: 129 MG/DL (ref 82–115)
GLUCOSE UR QL STRIP: NEGATIVE
HCT VFR BLD AUTO: 29.1 % (ref 37–47)
HGB BLD-MCNC: 9.4 G/DL (ref 12–16)
HGB UR QL STRIP: ABNORMAL
IMM GRANULOCYTES # BLD AUTO: 0.12 X10(3)/MCL (ref 0–0.04)
IMM GRANULOCYTES NFR BLD AUTO: 1.1 %
KETONES UR QL STRIP: NEGATIVE
LEUKOCYTE ESTERASE UR QL STRIP: NEGATIVE
LYMPHOCYTES # BLD AUTO: 1.68 X10(3)/MCL (ref 0.6–4.6)
LYMPHOCYTES NFR BLD AUTO: 15.3 %
MCH RBC QN AUTO: 24.2 PG (ref 27–31)
MCHC RBC AUTO-ENTMCNC: 32.3 G/DL (ref 33–36)
MCV RBC AUTO: 75 FL (ref 80–94)
MONOCYTES # BLD AUTO: 0.78 X10(3)/MCL (ref 0.1–1.3)
MONOCYTES NFR BLD AUTO: 7.1 %
NEUTROPHILS # BLD AUTO: 7.87 X10(3)/MCL (ref 2.1–9.2)
NEUTROPHILS NFR BLD AUTO: 71.5 %
NITRITE UR QL STRIP: NEGATIVE
PH UR STRIP: 6 [PH]
PLATELET # BLD AUTO: 262 X10(3)/MCL (ref 130–400)
PMV BLD AUTO: 10 FL (ref 7.4–10.4)
POTASSIUM SERPL-SCNC: 4.6 MMOL/L (ref 3.5–5.1)
PROT SERPL-MCNC: 8.5 GM/DL (ref 5.8–7.6)
PROT UR QL STRIP: NEGATIVE
RBC # BLD AUTO: 3.88 X10(6)/MCL (ref 4.2–5.4)
RBC #/AREA URNS AUTO: ABNORMAL /HPF
RSV A 5' UTR RNA NPH QL NAA+PROBE: NOT DETECTED
SARS-COV-2 RNA RESP QL NAA+PROBE: NOT DETECTED
SODIUM SERPL-SCNC: 142 MMOL/L (ref 136–145)
SP GR UR STRIP.AUTO: 1.01 (ref 1–1.03)
SQUAMOUS #/AREA URNS AUTO: ABNORMAL /HPF
UROBILINOGEN UR STRIP-ACNC: 0.2
WBC # BLD AUTO: 10.99 X10(3)/MCL (ref 4.5–11.5)
WBC #/AREA URNS AUTO: ABNORMAL /HPF
YEAST UR QL AUTO: ABNORMAL /HPF

## 2024-07-31 PROCEDURE — 94761 N-INVAS EAR/PLS OXIMETRY MLT: CPT

## 2024-07-31 PROCEDURE — G0378 HOSPITAL OBSERVATION PER HR: HCPCS

## 2024-07-31 PROCEDURE — 83880 ASSAY OF NATRIURETIC PEPTIDE: CPT | Performed by: INTERNAL MEDICINE

## 2024-07-31 PROCEDURE — 63600175 PHARM REV CODE 636 W HCPCS: Performed by: INTERNAL MEDICINE

## 2024-07-31 PROCEDURE — 25000003 PHARM REV CODE 250: Performed by: INTERNAL MEDICINE

## 2024-07-31 PROCEDURE — 99285 EMERGENCY DEPT VISIT HI MDM: CPT | Mod: 25

## 2024-07-31 PROCEDURE — 80053 COMPREHEN METABOLIC PANEL: CPT | Performed by: INTERNAL MEDICINE

## 2024-07-31 PROCEDURE — 0241U COVID/RSV/FLU A&B PCR: CPT | Performed by: INTERNAL MEDICINE

## 2024-07-31 PROCEDURE — 85025 COMPLETE CBC W/AUTO DIFF WBC: CPT | Performed by: INTERNAL MEDICINE

## 2024-07-31 PROCEDURE — 81003 URINALYSIS AUTO W/O SCOPE: CPT | Performed by: EMERGENCY MEDICINE

## 2024-07-31 PROCEDURE — 96375 TX/PRO/DX INJ NEW DRUG ADDON: CPT

## 2024-07-31 PROCEDURE — 25000003 PHARM REV CODE 250: Performed by: EMERGENCY MEDICINE

## 2024-07-31 RX ORDER — FLUOXETINE 10 MG/1
20 CAPSULE ORAL DAILY
Status: DISCONTINUED | OUTPATIENT
Start: 2024-08-01 | End: 2024-08-01 | Stop reason: HOSPADM

## 2024-07-31 RX ORDER — PANTOPRAZOLE SODIUM 40 MG/1
40 TABLET, DELAYED RELEASE ORAL DAILY
Status: DISCONTINUED | OUTPATIENT
Start: 2024-08-01 | End: 2024-08-01 | Stop reason: HOSPADM

## 2024-07-31 RX ORDER — FUROSEMIDE 10 MG/ML
80 INJECTION INTRAMUSCULAR; INTRAVENOUS
Status: COMPLETED | OUTPATIENT
Start: 2024-07-31 | End: 2024-07-31

## 2024-07-31 RX ORDER — CLOPIDOGREL BISULFATE 75 MG/1
75 TABLET ORAL DAILY
Status: DISCONTINUED | OUTPATIENT
Start: 2024-08-01 | End: 2024-08-01 | Stop reason: HOSPADM

## 2024-07-31 RX ORDER — CARVEDILOL 12.5 MG/1
12.5 TABLET ORAL 2 TIMES DAILY
Status: DISCONTINUED | OUTPATIENT
Start: 2024-07-31 | End: 2024-08-01 | Stop reason: HOSPADM

## 2024-07-31 RX ORDER — CIPROFLOXACIN 500 MG/1
500 TABLET ORAL 2 TIMES DAILY
Status: ON HOLD | COMMUNITY
Start: 2024-07-26 | End: 2024-08-01 | Stop reason: HOSPADM

## 2024-07-31 RX ORDER — SODIUM CHLORIDE 0.9 % (FLUSH) 0.9 %
10 SYRINGE (ML) INJECTION
Status: DISCONTINUED | OUTPATIENT
Start: 2024-07-31 | End: 2024-08-01 | Stop reason: HOSPADM

## 2024-07-31 RX ORDER — TALC
6 POWDER (GRAM) TOPICAL NIGHTLY PRN
Status: DISCONTINUED | OUTPATIENT
Start: 2024-07-31 | End: 2024-08-01 | Stop reason: HOSPADM

## 2024-07-31 RX ORDER — AMLODIPINE BESYLATE 5 MG/1
5 TABLET ORAL DAILY
Status: DISCONTINUED | OUTPATIENT
Start: 2024-08-01 | End: 2024-08-01 | Stop reason: HOSPADM

## 2024-07-31 RX ORDER — ACETAMINOPHEN 325 MG/1
650 TABLET ORAL EVERY 4 HOURS PRN
Status: DISCONTINUED | OUTPATIENT
Start: 2024-07-31 | End: 2024-08-01 | Stop reason: HOSPADM

## 2024-07-31 RX ORDER — ONDANSETRON HYDROCHLORIDE 2 MG/ML
4 INJECTION, SOLUTION INTRAVENOUS EVERY 6 HOURS PRN
Status: DISCONTINUED | OUTPATIENT
Start: 2024-07-31 | End: 2024-08-01 | Stop reason: HOSPADM

## 2024-07-31 RX ADMIN — CARVEDILOL 12.5 MG: 12.5 TABLET, FILM COATED ORAL at 09:07

## 2024-07-31 RX ADMIN — Medication 6 MG: at 09:07

## 2024-07-31 RX ADMIN — FUROSEMIDE 80 MG: 10 INJECTION, SOLUTION INTRAMUSCULAR; INTRAVENOUS at 12:07

## 2024-07-31 RX ADMIN — APIXABAN 5 MG: 5 TABLET, FILM COATED ORAL at 09:07

## 2024-08-01 VITALS
BODY MASS INDEX: 41.95 KG/M2 | SYSTOLIC BLOOD PRESSURE: 113 MMHG | WEIGHT: 293 LBS | RESPIRATION RATE: 20 BRPM | HEIGHT: 70 IN | OXYGEN SATURATION: 94 % | DIASTOLIC BLOOD PRESSURE: 57 MMHG | HEART RATE: 56 BPM | TEMPERATURE: 98 F

## 2024-08-01 LAB
ALBUMIN SERPL-MCNC: 2.4 G/DL (ref 3.4–4.8)
ALBUMIN/GLOB SERPL: 0.5 RATIO (ref 1.1–2)
ALP SERPL-CCNC: 80 UNIT/L (ref 40–150)
ALT SERPL-CCNC: 11 UNIT/L (ref 0–55)
ANION GAP SERPL CALC-SCNC: 10 MEQ/L
AST SERPL-CCNC: 15 UNIT/L (ref 5–34)
BASOPHILS # BLD AUTO: 0.05 X10(3)/MCL
BASOPHILS NFR BLD AUTO: 0.4 %
BILIRUB SERPL-MCNC: 0.4 MG/DL
BUN SERPL-MCNC: 48.3 MG/DL (ref 9.8–20.1)
CALCIUM SERPL-MCNC: 8.7 MG/DL (ref 8.4–10.2)
CHLORIDE SERPL-SCNC: 109 MMOL/L (ref 98–107)
CK SERPL-CCNC: 43 U/L (ref 29–168)
CO2 SERPL-SCNC: 22 MMOL/L (ref 23–31)
CREAT SERPL-MCNC: 2.87 MG/DL (ref 0.55–1.02)
CREAT/UREA NIT SERPL: 17
EOSINOPHIL # BLD AUTO: 0.22 X10(3)/MCL (ref 0–0.9)
EOSINOPHIL NFR BLD AUTO: 1.9 %
ERYTHROCYTE [DISTWIDTH] IN BLOOD BY AUTOMATED COUNT: 15.2 % (ref 11.5–17)
EST. AVERAGE GLUCOSE BLD GHB EST-MCNC: 137 MG/DL
FERRITIN SERPL-MCNC: 464.67 NG/ML (ref 4.63–204)
GFR SERPLBLD CREATININE-BSD FMLA CKD-EPI: 16 ML/MIN/1.73/M2
GLOBULIN SER-MCNC: 5.3 GM/DL (ref 2.4–3.5)
GLUCOSE SERPL-MCNC: 133 MG/DL (ref 82–115)
HBA1C MFR BLD: 6.4 %
HCT VFR BLD AUTO: 25.8 % (ref 37–47)
HGB BLD-MCNC: 8.5 G/DL (ref 12–16)
IMM GRANULOCYTES # BLD AUTO: 0.09 X10(3)/MCL (ref 0–0.04)
IMM GRANULOCYTES NFR BLD AUTO: 0.8 %
IRON SATN MFR SERPL: 10 % (ref 20–50)
IRON SERPL-MCNC: 13 UG/DL (ref 50–170)
LYMPHOCYTES # BLD AUTO: 1.75 X10(3)/MCL (ref 0.6–4.6)
LYMPHOCYTES NFR BLD AUTO: 15.1 %
MAGNESIUM SERPL-MCNC: 2.1 MG/DL (ref 1.6–2.6)
MCH RBC QN AUTO: 24.4 PG (ref 27–31)
MCHC RBC AUTO-ENTMCNC: 32.9 G/DL (ref 33–36)
MCV RBC AUTO: 73.9 FL (ref 80–94)
MONOCYTES # BLD AUTO: 0.69 X10(3)/MCL (ref 0.1–1.3)
MONOCYTES NFR BLD AUTO: 6 %
NEUTROPHILS # BLD AUTO: 8.76 X10(3)/MCL (ref 2.1–9.2)
NEUTROPHILS NFR BLD AUTO: 75.8 %
PLATELET # BLD AUTO: 267 X10(3)/MCL (ref 130–400)
PMV BLD AUTO: 10.1 FL (ref 7.4–10.4)
POTASSIUM SERPL-SCNC: 4.5 MMOL/L (ref 3.5–5.1)
PROT SERPL-MCNC: 7.7 GM/DL (ref 5.8–7.6)
RBC # BLD AUTO: 3.49 X10(6)/MCL (ref 4.2–5.4)
SODIUM SERPL-SCNC: 141 MMOL/L (ref 136–145)
T4 FREE SERPL-MCNC: 0.81 NG/DL (ref 0.7–1.48)
TIBC SERPL-MCNC: 113 UG/DL (ref 70–310)
TIBC SERPL-MCNC: 126 UG/DL (ref 250–450)
TSH SERPL-ACNC: 1.53 UIU/ML (ref 0.35–4.94)
WBC # BLD AUTO: 11.56 X10(3)/MCL (ref 4.5–11.5)

## 2024-08-01 PROCEDURE — 83036 HEMOGLOBIN GLYCOSYLATED A1C: CPT | Performed by: EMERGENCY MEDICINE

## 2024-08-01 PROCEDURE — 85025 COMPLETE CBC W/AUTO DIFF WBC: CPT | Performed by: EMERGENCY MEDICINE

## 2024-08-01 PROCEDURE — G0378 HOSPITAL OBSERVATION PER HR: HCPCS

## 2024-08-01 PROCEDURE — 83735 ASSAY OF MAGNESIUM: CPT | Performed by: EMERGENCY MEDICINE

## 2024-08-01 PROCEDURE — 36415 COLL VENOUS BLD VENIPUNCTURE: CPT | Performed by: EMERGENCY MEDICINE

## 2024-08-01 PROCEDURE — 82550 ASSAY OF CK (CPK): CPT | Performed by: EMERGENCY MEDICINE

## 2024-08-01 PROCEDURE — 25000003 PHARM REV CODE 250: Performed by: EMERGENCY MEDICINE

## 2024-08-01 PROCEDURE — 63600175 PHARM REV CODE 636 W HCPCS: Performed by: INTERNAL MEDICINE

## 2024-08-01 PROCEDURE — 83550 IRON BINDING TEST: CPT | Performed by: EMERGENCY MEDICINE

## 2024-08-01 PROCEDURE — 84439 ASSAY OF FREE THYROXINE: CPT | Performed by: EMERGENCY MEDICINE

## 2024-08-01 PROCEDURE — 94761 N-INVAS EAR/PLS OXIMETRY MLT: CPT

## 2024-08-01 PROCEDURE — 80053 COMPREHEN METABOLIC PANEL: CPT | Performed by: EMERGENCY MEDICINE

## 2024-08-01 PROCEDURE — 84443 ASSAY THYROID STIM HORMONE: CPT | Performed by: EMERGENCY MEDICINE

## 2024-08-01 PROCEDURE — 25000003 PHARM REV CODE 250: Performed by: INTERNAL MEDICINE

## 2024-08-01 PROCEDURE — 82728 ASSAY OF FERRITIN: CPT | Performed by: EMERGENCY MEDICINE

## 2024-08-01 PROCEDURE — 83540 ASSAY OF IRON: CPT | Performed by: EMERGENCY MEDICINE

## 2024-08-01 PROCEDURE — 96365 THER/PROPH/DIAG IV INF INIT: CPT

## 2024-08-01 RX ORDER — MUPIROCIN 20 MG/G
OINTMENT TOPICAL 2 TIMES DAILY
Status: DISCONTINUED | OUTPATIENT
Start: 2024-08-01 | End: 2024-08-01 | Stop reason: HOSPADM

## 2024-08-01 RX ADMIN — FLUOXETINE 20 MG: 10 CAPSULE ORAL at 09:08

## 2024-08-01 RX ADMIN — SODIUM CHLORIDE 250 MG: 9 INJECTION, SOLUTION INTRAVENOUS at 10:08

## 2024-08-01 RX ADMIN — CARVEDILOL 12.5 MG: 12.5 TABLET, FILM COATED ORAL at 09:08

## 2024-08-01 RX ADMIN — CLOPIDOGREL BISULFATE 75 MG: 75 TABLET ORAL at 09:08

## 2024-08-01 RX ADMIN — MUPIROCIN 1 G: 20 OINTMENT TOPICAL at 12:08

## 2024-08-01 RX ADMIN — PANTOPRAZOLE SODIUM 40 MG: 40 TABLET, DELAYED RELEASE ORAL at 09:08

## 2024-08-01 RX ADMIN — APIXABAN 5 MG: 5 TABLET, FILM COATED ORAL at 09:08

## 2024-08-01 RX ADMIN — AMLODIPINE BESYLATE 5 MG: 5 TABLET ORAL at 09:08

## 2024-08-02 ENCOUNTER — PATIENT OUTREACH (OUTPATIENT)
Dept: ADMINISTRATIVE | Facility: CLINIC | Age: 84
End: 2024-08-02
Payer: MEDICARE

## 2024-08-02 NOTE — PROGRESS NOTES
C3 nurse attempted to contact Jil Graves for a TCC post hospital discharge follow up call. No answer. Left voicemail with callback information. The patient has a scheduled HOSFU appointment with Antwon Melendez MD on 8/5/5/24 @ 11:30.

## 2024-08-06 DIAGNOSIS — N18.32 STAGE 3B CHRONIC KIDNEY DISEASE: Primary | ICD-10-CM

## 2024-08-08 ENCOUNTER — HOSPITAL ENCOUNTER (INPATIENT)
Facility: HOSPITAL | Age: 84
LOS: 3 days | Discharge: HOME HEALTH | DRG: 872 | End: 2024-08-11
Attending: INTERNAL MEDICINE | Admitting: INTERNAL MEDICINE
Payer: MEDICARE

## 2024-08-08 DIAGNOSIS — K57.90 DIVERTICULOSIS: Primary | ICD-10-CM

## 2024-08-08 DIAGNOSIS — N39.0 UTI (URINARY TRACT INFECTION): ICD-10-CM

## 2024-08-08 DIAGNOSIS — L97.909 STASIS ULCER OF LOWER EXTREMITY, UNSPECIFIED LATERALITY: ICD-10-CM

## 2024-08-08 DIAGNOSIS — I10 HYPERTENSION, UNSPECIFIED TYPE: ICD-10-CM

## 2024-08-08 DIAGNOSIS — L97.912 NON-PRESSURE CHRONIC ULCER OF RIGHT LOWER LEG WITH FAT LAYER EXPOSED: ICD-10-CM

## 2024-08-08 DIAGNOSIS — I83.009 STASIS ULCER OF LOWER EXTREMITY, UNSPECIFIED LATERALITY: ICD-10-CM

## 2024-08-08 DIAGNOSIS — I83.009 VENOUS STASIS ULCER, UNSPECIFIED SITE, UNSPECIFIED ULCER STAGE, UNSPECIFIED WHETHER VARICOSE VEINS PRESENT: ICD-10-CM

## 2024-08-08 DIAGNOSIS — R50.9 FEVER: ICD-10-CM

## 2024-08-08 DIAGNOSIS — L97.909 VENOUS STASIS ULCER, UNSPECIFIED SITE, UNSPECIFIED ULCER STAGE, UNSPECIFIED WHETHER VARICOSE VEINS PRESENT: ICD-10-CM

## 2024-08-08 DIAGNOSIS — N39.0 URINARY TRACT INFECTION WITHOUT HEMATURIA, SITE UNSPECIFIED: ICD-10-CM

## 2024-08-08 LAB
ALBUMIN SERPL-MCNC: 2.5 G/DL (ref 3.4–4.8)
ALBUMIN/GLOB SERPL: 0.5 RATIO (ref 1.1–2)
ALP SERPL-CCNC: 65 UNIT/L (ref 40–150)
ALT SERPL-CCNC: 9 UNIT/L (ref 0–55)
AMORPH URATE CRY URNS QL MICRO: ABNORMAL /HPF
ANION GAP SERPL CALC-SCNC: 9 MEQ/L
AST SERPL-CCNC: 11 UNIT/L (ref 5–34)
BACTERIA #/AREA URNS AUTO: ABNORMAL /HPF
BASOPHILS # BLD AUTO: 0.05 X10(3)/MCL
BASOPHILS NFR BLD AUTO: 0.4 %
BILIRUB SERPL-MCNC: 0.3 MG/DL
BILIRUB UR QL STRIP.AUTO: NEGATIVE
BUN SERPL-MCNC: 38 MG/DL (ref 9.8–20.1)
CALCIUM SERPL-MCNC: 8.5 MG/DL (ref 8.4–10.2)
CHLORIDE SERPL-SCNC: 112 MMOL/L (ref 98–107)
CLARITY UR: CLEAR
CO2 SERPL-SCNC: 16 MMOL/L (ref 23–31)
COLOR UR AUTO: YELLOW
CREAT SERPL-MCNC: 1.94 MG/DL (ref 0.55–1.02)
CREAT/UREA NIT SERPL: 20
EOSINOPHIL # BLD AUTO: 0.02 X10(3)/MCL (ref 0–0.9)
EOSINOPHIL NFR BLD AUTO: 0.1 %
ERYTHROCYTE [DISTWIDTH] IN BLOOD BY AUTOMATED COUNT: 15.8 % (ref 11.5–17)
FLUAV AG UPPER RESP QL IA.RAPID: NOT DETECTED
FLUBV AG UPPER RESP QL IA.RAPID: NOT DETECTED
GFR SERPLBLD CREATININE-BSD FMLA CKD-EPI: 25 ML/MIN/1.73/M2
GLOBULIN SER-MCNC: 5.5 GM/DL (ref 2.4–3.5)
GLUCOSE SERPL-MCNC: 141 MG/DL (ref 82–115)
GLUCOSE UR QL STRIP: NEGATIVE
HCT VFR BLD AUTO: 26 % (ref 37–47)
HGB BLD-MCNC: 8.6 G/DL (ref 12–16)
HGB UR QL STRIP: ABNORMAL
IMM GRANULOCYTES # BLD AUTO: 0.07 X10(3)/MCL (ref 0–0.04)
IMM GRANULOCYTES NFR BLD AUTO: 0.5 %
KETONES UR QL STRIP: NEGATIVE
LACTATE SERPL-SCNC: 1 MMOL/L (ref 0.5–2.2)
LACTATE SERPL-SCNC: 2.5 MMOL/L (ref 0.5–2.2)
LEUKOCYTE ESTERASE UR QL STRIP: ABNORMAL
LYMPHOCYTES # BLD AUTO: 1.06 X10(3)/MCL (ref 0.6–4.6)
LYMPHOCYTES NFR BLD AUTO: 7.9 %
MCH RBC QN AUTO: 25.1 PG (ref 27–31)
MCHC RBC AUTO-ENTMCNC: 33.1 G/DL (ref 33–36)
MCV RBC AUTO: 76 FL (ref 80–94)
MONOCYTES # BLD AUTO: 0.86 X10(3)/MCL (ref 0.1–1.3)
MONOCYTES NFR BLD AUTO: 6.4 %
NEUTROPHILS # BLD AUTO: 11.34 X10(3)/MCL (ref 2.1–9.2)
NEUTROPHILS NFR BLD AUTO: 84.7 %
NITRITE UR QL STRIP: POSITIVE
PH UR STRIP: 5.5 [PH]
PLATELET # BLD AUTO: 348 X10(3)/MCL (ref 130–400)
PMV BLD AUTO: 9.8 FL (ref 7.4–10.4)
POTASSIUM SERPL-SCNC: 5.4 MMOL/L (ref 3.5–5.1)
PROT SERPL-MCNC: 8 GM/DL (ref 5.8–7.6)
PROT UR QL STRIP: ABNORMAL
RBC # BLD AUTO: 3.42 X10(6)/MCL (ref 4.2–5.4)
RBC #/AREA URNS AUTO: ABNORMAL /HPF
RSV A 5' UTR RNA NPH QL NAA+PROBE: NOT DETECTED
SARS-COV-2 RNA RESP QL NAA+PROBE: NOT DETECTED
SODIUM SERPL-SCNC: 137 MMOL/L (ref 136–145)
SP GR UR STRIP.AUTO: 1.01 (ref 1–1.03)
SQUAMOUS #/AREA URNS AUTO: ABNORMAL /HPF
UROBILINOGEN UR STRIP-ACNC: 0.2
WBC # BLD AUTO: 13.4 X10(3)/MCL (ref 4.5–11.5)
WBC #/AREA URNS AUTO: ABNORMAL /HPF

## 2024-08-08 PROCEDURE — 87040 BLOOD CULTURE FOR BACTERIA: CPT | Performed by: PHYSICIAN ASSISTANT

## 2024-08-08 PROCEDURE — 11000001 HC ACUTE MED/SURG PRIVATE ROOM

## 2024-08-08 PROCEDURE — 93005 ELECTROCARDIOGRAM TRACING: CPT

## 2024-08-08 PROCEDURE — 0241U COVID/RSV/FLU A&B PCR: CPT | Performed by: PHYSICIAN ASSISTANT

## 2024-08-08 PROCEDURE — 93010 ELECTROCARDIOGRAM REPORT: CPT | Mod: ,,, | Performed by: INTERNAL MEDICINE

## 2024-08-08 PROCEDURE — 87086 URINE CULTURE/COLONY COUNT: CPT | Performed by: PHYSICIAN ASSISTANT

## 2024-08-08 PROCEDURE — 63600175 PHARM REV CODE 636 W HCPCS: Performed by: PHYSICIAN ASSISTANT

## 2024-08-08 PROCEDURE — 25000003 PHARM REV CODE 250: Performed by: PHYSICIAN ASSISTANT

## 2024-08-08 PROCEDURE — 87154 CUL TYP ID BLD PTHGN 6+ TRGT: CPT | Performed by: PHYSICIAN ASSISTANT

## 2024-08-08 PROCEDURE — 96361 HYDRATE IV INFUSION ADD-ON: CPT

## 2024-08-08 PROCEDURE — 85025 COMPLETE CBC W/AUTO DIFF WBC: CPT | Performed by: PHYSICIAN ASSISTANT

## 2024-08-08 PROCEDURE — 21400001 HC TELEMETRY ROOM

## 2024-08-08 PROCEDURE — 87077 CULTURE AEROBIC IDENTIFY: CPT | Performed by: PHYSICIAN ASSISTANT

## 2024-08-08 PROCEDURE — 81003 URINALYSIS AUTO W/O SCOPE: CPT | Performed by: PHYSICIAN ASSISTANT

## 2024-08-08 PROCEDURE — 87186 SC STD MICRODIL/AGAR DIL: CPT | Performed by: PHYSICIAN ASSISTANT

## 2024-08-08 PROCEDURE — 96365 THER/PROPH/DIAG IV INF INIT: CPT

## 2024-08-08 PROCEDURE — 81001 URINALYSIS AUTO W/SCOPE: CPT | Performed by: PHYSICIAN ASSISTANT

## 2024-08-08 PROCEDURE — 83605 ASSAY OF LACTIC ACID: CPT | Performed by: PHYSICIAN ASSISTANT

## 2024-08-08 PROCEDURE — 99285 EMERGENCY DEPT VISIT HI MDM: CPT | Mod: 25

## 2024-08-08 PROCEDURE — 80053 COMPREHEN METABOLIC PANEL: CPT | Performed by: PHYSICIAN ASSISTANT

## 2024-08-08 RX ORDER — SODIUM CHLORIDE 9 MG/ML
1000 INJECTION, SOLUTION INTRAVENOUS CONTINUOUS
Status: DISCONTINUED | OUTPATIENT
Start: 2024-08-08 | End: 2024-08-09

## 2024-08-08 RX ORDER — CARVEDILOL 12.5 MG/1
12.5 TABLET ORAL 2 TIMES DAILY
Status: DISCONTINUED | OUTPATIENT
Start: 2024-08-09 | End: 2024-08-11 | Stop reason: HOSPADM

## 2024-08-08 RX ORDER — PANTOPRAZOLE SODIUM 40 MG/1
40 TABLET, DELAYED RELEASE ORAL
Status: DISCONTINUED | OUTPATIENT
Start: 2024-08-09 | End: 2024-08-11 | Stop reason: HOSPADM

## 2024-08-08 RX ORDER — ACETAMINOPHEN 500 MG
1000 TABLET ORAL
Status: COMPLETED | OUTPATIENT
Start: 2024-08-08 | End: 2024-08-08

## 2024-08-08 RX ORDER — SODIUM CHLORIDE 9 MG/ML
1000 INJECTION, SOLUTION INTRAVENOUS
Status: COMPLETED | OUTPATIENT
Start: 2024-08-08 | End: 2024-08-08

## 2024-08-08 RX ORDER — AMLODIPINE BESYLATE 5 MG/1
5 TABLET ORAL DAILY
Status: DISCONTINUED | OUTPATIENT
Start: 2024-08-09 | End: 2024-08-10

## 2024-08-08 RX ORDER — CLOPIDOGREL BISULFATE 75 MG/1
75 TABLET ORAL DAILY
Status: DISCONTINUED | OUTPATIENT
Start: 2024-08-09 | End: 2024-08-11 | Stop reason: HOSPADM

## 2024-08-08 RX ORDER — FLUOXETINE 10 MG/1
20 CAPSULE ORAL DAILY
Status: DISCONTINUED | OUTPATIENT
Start: 2024-08-09 | End: 2024-08-11 | Stop reason: HOSPADM

## 2024-08-08 RX ORDER — SODIUM CHLORIDE 9 MG/ML
1000 INJECTION, SOLUTION INTRAVENOUS
Status: DISCONTINUED | OUTPATIENT
Start: 2024-08-08 | End: 2024-08-08

## 2024-08-08 RX ADMIN — SODIUM CHLORIDE 1000 ML: 9 INJECTION, SOLUTION INTRAVENOUS at 08:08

## 2024-08-08 RX ADMIN — CEFEPIME 1 G: 1 INJECTION, POWDER, FOR SOLUTION INTRAMUSCULAR; INTRAVENOUS at 10:08

## 2024-08-08 RX ADMIN — ACETAMINOPHEN 1000 MG: 500 TABLET, FILM COATED ORAL at 08:08

## 2024-08-09 LAB
ACINETOBACTER CALCOACETICUS-BAUMANNII COMPLEX (OHS): NOT DETECTED
ALBUMIN SERPL-MCNC: 2.3 G/DL (ref 3.4–4.8)
ALBUMIN/GLOB SERPL: 0.5 RATIO (ref 1.1–2)
ALP SERPL-CCNC: 60 UNIT/L (ref 40–150)
ALT SERPL-CCNC: 9 UNIT/L (ref 0–55)
ANION GAP SERPL CALC-SCNC: 7 MEQ/L
AST SERPL-CCNC: 13 UNIT/L (ref 5–34)
BACTEROIDES FRAGILIS (OHS): NOT DETECTED
BASOPHILS # BLD AUTO: 0.07 X10(3)/MCL
BASOPHILS NFR BLD AUTO: 0.5 %
BILIRUB SERPL-MCNC: 0.4 MG/DL
BUN SERPL-MCNC: 36 MG/DL (ref 9.8–20.1)
C AURIS DNA BLD POS QL NAA+NON-PROBE: NOT DETECTED
C GATTII+NEOFOR DNA CSF QL NAA+NON-PROBE: NOT DETECTED
CALCIUM SERPL-MCNC: 8.2 MG/DL (ref 8.4–10.2)
CANDIDA ALBICANS (OHS): NOT DETECTED
CANDIDA GLABRATA (OHS): NOT DETECTED
CANDIDA KRUSEI (OHS): NOT DETECTED
CANDIDA PARAPSILOSIS (OHS): NOT DETECTED
CANDIDA TROPICALIS (OHS): NOT DETECTED
CHLORIDE SERPL-SCNC: 114 MMOL/L (ref 98–107)
CO2 SERPL-SCNC: 17 MMOL/L (ref 23–31)
CREAT SERPL-MCNC: 1.79 MG/DL (ref 0.55–1.02)
CREAT/UREA NIT SERPL: 20
CTX-M (OHS): NOT DETECTED
ENTEROBACTER CLOACAE COMPLEX (OHS): NOT DETECTED
ENTEROBACTERALES (OHS): DETECTED
ENTEROCOCCUS FAECALIS (OHS): NOT DETECTED
ENTEROCOCCUS FAECIUM (OHS): NOT DETECTED
EOSINOPHIL # BLD AUTO: 0.01 X10(3)/MCL (ref 0–0.9)
EOSINOPHIL NFR BLD AUTO: 0.1 %
ERYTHROCYTE [DISTWIDTH] IN BLOOD BY AUTOMATED COUNT: 15.7 % (ref 11.5–17)
ESCHERICHIA COLI (OHS): DETECTED
GFR SERPLBLD CREATININE-BSD FMLA CKD-EPI: 28 ML/MIN/1.73/M2
GLOBULIN SER-MCNC: 5.1 GM/DL (ref 2.4–3.5)
GLUCOSE SERPL-MCNC: 166 MG/DL (ref 82–115)
GP B STREP DNA CSF QL NAA+NON-PROBE: NOT DETECTED
HAEM INFLU DNA CSF QL NAA+NON-PROBE: NOT DETECTED
HCT VFR BLD AUTO: 24.3 % (ref 37–47)
HGB BLD-MCNC: 8 G/DL (ref 12–16)
IMM GRANULOCYTES # BLD AUTO: 0.09 X10(3)/MCL (ref 0–0.04)
IMM GRANULOCYTES NFR BLD AUTO: 0.6 %
IMP (OHS): NOT DETECTED
KLEBSIELLA AEROGENES (OHS): NOT DETECTED
KLEBSIELLA OXYTOCA (OHS): NOT DETECTED
KLEBSIELLA PNEUMONIAE GROUP (OHS): NOT DETECTED
KPC (OHS): NOT DETECTED
L MONOCYTOG DNA CSF QL NAA+NON-PROBE: NOT DETECTED
LYMPHOCYTES # BLD AUTO: 1.23 X10(3)/MCL (ref 0.6–4.6)
LYMPHOCYTES NFR BLD AUTO: 8.5 %
MCH RBC QN AUTO: 25.5 PG (ref 27–31)
MCHC RBC AUTO-ENTMCNC: 32.9 G/DL (ref 33–36)
MCR-1 (OHS): NOT DETECTED
MCV RBC AUTO: 77.4 FL (ref 80–94)
MECA/C (OHS): ABNORMAL
MECA/C AND MREJ (MRSA)(OHS): ABNORMAL
MONOCYTES # BLD AUTO: 1.16 X10(3)/MCL (ref 0.1–1.3)
MONOCYTES NFR BLD AUTO: 8 %
N MEN DNA CSF QL NAA+NON-PROBE: NOT DETECTED
NDM (OHS): NOT DETECTED
NEUTROPHILS # BLD AUTO: 11.85 X10(3)/MCL (ref 2.1–9.2)
NEUTROPHILS NFR BLD AUTO: 82.3 %
OHS QRS DURATION: 132 MS
OHS QTC CALCULATION: 475 MS
OXA-48-LIKE (OHS): NOT DETECTED
PLATELET # BLD AUTO: 316 X10(3)/MCL (ref 130–400)
PMV BLD AUTO: 10.1 FL (ref 7.4–10.4)
POTASSIUM SERPL-SCNC: 5 MMOL/L (ref 3.5–5.1)
PROT SERPL-MCNC: 7.4 GM/DL (ref 5.8–7.6)
PROTEUS SPP. (OHS): NOT DETECTED
PSEUDOMONAS AERUGINOSA (OHS): NOT DETECTED
RBC # BLD AUTO: 3.14 X10(6)/MCL (ref 4.2–5.4)
S ENT+BONG DNA STL QL NAA+NON-PROBE: NOT DETECTED
S PNEUM DNA CSF QL NAA+NON-PROBE: NOT DETECTED
SERRATIA MARCESCENS (OHS): NOT DETECTED
SODIUM SERPL-SCNC: 138 MMOL/L (ref 136–145)
STAPHYLOCOCCUS AUREUS (OHS): NOT DETECTED
STAPHYLOCOCCUS EPIDERMIDIS (OHS): NOT DETECTED
STAPHYLOCOCCUS LUGDUNENSIS (OHS): NOT DETECTED
STAPHYLOCOCCUS SPP. (OHS): NOT DETECTED
STENOTROPHOMONAS MALTOPHILIA (OHS): NOT DETECTED
STREPTOCOCCUS PYOGENES (GROUP A)(OHS): NOT DETECTED
STREPTOCOCCUS SPP. (OHS): NOT DETECTED
VANA/B (OHS): ABNORMAL
VIM (OHS): NOT DETECTED
WBC # BLD AUTO: 14.41 X10(3)/MCL (ref 4.5–11.5)

## 2024-08-09 PROCEDURE — 85025 COMPLETE CBC W/AUTO DIFF WBC: CPT | Performed by: INTERNAL MEDICINE

## 2024-08-09 PROCEDURE — 94761 N-INVAS EAR/PLS OXIMETRY MLT: CPT

## 2024-08-09 PROCEDURE — 25000003 PHARM REV CODE 250: Performed by: INTERNAL MEDICINE

## 2024-08-09 PROCEDURE — 21400001 HC TELEMETRY ROOM

## 2024-08-09 PROCEDURE — 99900035 HC TECH TIME PER 15 MIN (STAT)

## 2024-08-09 PROCEDURE — 36415 COLL VENOUS BLD VENIPUNCTURE: CPT | Performed by: INTERNAL MEDICINE

## 2024-08-09 PROCEDURE — 94640 AIRWAY INHALATION TREATMENT: CPT

## 2024-08-09 PROCEDURE — 80053 COMPREHEN METABOLIC PANEL: CPT | Performed by: INTERNAL MEDICINE

## 2024-08-09 PROCEDURE — 63600175 PHARM REV CODE 636 W HCPCS: Performed by: INTERNAL MEDICINE

## 2024-08-09 PROCEDURE — 25000242 PHARM REV CODE 250 ALT 637 W/ HCPCS: Performed by: INTERNAL MEDICINE

## 2024-08-09 RX ORDER — SODIUM CHLORIDE 9 MG/ML
1000 INJECTION, SOLUTION INTRAVENOUS CONTINUOUS
Status: ACTIVE | OUTPATIENT
Start: 2024-08-09 | End: 2024-08-09

## 2024-08-09 RX ORDER — SODIUM BICARBONATE 650 MG/1
650 TABLET ORAL 3 TIMES DAILY
Status: DISCONTINUED | OUTPATIENT
Start: 2024-08-09 | End: 2024-08-11 | Stop reason: HOSPADM

## 2024-08-09 RX ORDER — ACETAMINOPHEN 325 MG/1
650 TABLET ORAL EVERY 8 HOURS PRN
Status: DISCONTINUED | OUTPATIENT
Start: 2024-08-09 | End: 2024-08-11 | Stop reason: HOSPADM

## 2024-08-09 RX ORDER — SODIUM CHLORIDE 0.9 % (FLUSH) 0.9 %
10 SYRINGE (ML) INJECTION EVERY 8 HOURS
Status: DISCONTINUED | OUTPATIENT
Start: 2024-08-09 | End: 2024-08-11 | Stop reason: HOSPADM

## 2024-08-09 RX ORDER — BUDESONIDE 0.5 MG/2ML
0.5 INHALANT ORAL EVERY 12 HOURS
Status: DISCONTINUED | OUTPATIENT
Start: 2024-08-09 | End: 2024-08-11 | Stop reason: HOSPADM

## 2024-08-09 RX ORDER — SODIUM CHLORIDE 9 MG/ML
1000 INJECTION, SOLUTION INTRAVENOUS CONTINUOUS
Status: DISCONTINUED | OUTPATIENT
Start: 2024-08-09 | End: 2024-08-09

## 2024-08-09 RX ORDER — IPRATROPIUM BROMIDE AND ALBUTEROL SULFATE 2.5; .5 MG/3ML; MG/3ML
3 SOLUTION RESPIRATORY (INHALATION) EVERY 6 HOURS PRN
Status: DISCONTINUED | OUTPATIENT
Start: 2024-08-09 | End: 2024-08-11 | Stop reason: HOSPADM

## 2024-08-09 RX ORDER — ONDANSETRON HYDROCHLORIDE 2 MG/ML
4 INJECTION, SOLUTION INTRAVENOUS EVERY 6 HOURS PRN
Status: DISCONTINUED | OUTPATIENT
Start: 2024-08-09 | End: 2024-08-11 | Stop reason: HOSPADM

## 2024-08-09 RX ORDER — SODIUM BICARBONATE 1 MEQ/ML
50 SYRINGE (ML) INTRAVENOUS ONCE
Status: COMPLETED | OUTPATIENT
Start: 2024-08-09 | End: 2024-08-09

## 2024-08-09 RX ADMIN — CLOPIDOGREL BISULFATE 75 MG: 75 TABLET ORAL at 11:08

## 2024-08-09 RX ADMIN — SODIUM CHLORIDE 1000 ML: 9 INJECTION, SOLUTION INTRAVENOUS at 07:08

## 2024-08-09 RX ADMIN — APIXABAN 2.5 MG: 2.5 TABLET, FILM COATED ORAL at 11:08

## 2024-08-09 RX ADMIN — CARVEDILOL 12.5 MG: 12.5 TABLET, FILM COATED ORAL at 11:08

## 2024-08-09 RX ADMIN — CARVEDILOL 12.5 MG: 12.5 TABLET, FILM COATED ORAL at 08:08

## 2024-08-09 RX ADMIN — SODIUM BICARBONATE 650 MG: 650 TABLET ORAL at 08:08

## 2024-08-09 RX ADMIN — APIXABAN 2.5 MG: 2.5 TABLET, FILM COATED ORAL at 12:08

## 2024-08-09 RX ADMIN — SODIUM CHLORIDE 1000 ML: 9 INJECTION, SOLUTION INTRAVENOUS at 12:08

## 2024-08-09 RX ADMIN — BUDESONIDE 0.5 MG: 0.5 INHALANT RESPIRATORY (INHALATION) at 07:08

## 2024-08-09 RX ADMIN — AMLODIPINE BESYLATE 5 MG: 5 TABLET ORAL at 11:08

## 2024-08-09 RX ADMIN — SODIUM BICARBONATE 650 MG: 650 TABLET ORAL at 03:08

## 2024-08-09 RX ADMIN — SODIUM BICARBONATE 650 MG: 650 TABLET ORAL at 11:08

## 2024-08-09 RX ADMIN — IPRATROPIUM BROMIDE AND ALBUTEROL SULFATE 3 ML: .5; 3 SOLUTION RESPIRATORY (INHALATION) at 07:08

## 2024-08-09 RX ADMIN — CEFEPIME 1 G: 1 INJECTION, POWDER, FOR SOLUTION INTRAMUSCULAR; INTRAVENOUS at 08:08

## 2024-08-09 RX ADMIN — IPRATROPIUM BROMIDE AND ALBUTEROL SULFATE 3 ML: .5; 3 SOLUTION RESPIRATORY (INHALATION) at 03:08

## 2024-08-09 RX ADMIN — SODIUM BICARBONATE 50 MEQ: 84 INJECTION INTRAVENOUS at 08:08

## 2024-08-09 RX ADMIN — CEFEPIME 1 G: 1 INJECTION, POWDER, FOR SOLUTION INTRAMUSCULAR; INTRAVENOUS at 09:08

## 2024-08-09 RX ADMIN — APIXABAN 2.5 MG: 2.5 TABLET, FILM COATED ORAL at 08:08

## 2024-08-09 RX ADMIN — ACETAMINOPHEN 650 MG: 325 TABLET, FILM COATED ORAL at 08:08

## 2024-08-09 RX ADMIN — FLUOXETINE 20 MG: 10 CAPSULE ORAL at 11:08

## 2024-08-09 RX ADMIN — PANTOPRAZOLE SODIUM 40 MG: 40 TABLET, DELAYED RELEASE ORAL at 05:08

## 2024-08-09 NOTE — H&P
Chief Complaint; weakness over the past few days and fever yesterday    HPI:   Patient is a 84 y.o. morbidly obese female with a medical history of hypertension, chronic kidney disease stage IIIA, bilateral lower extremities lymphedema on unna boot, chronic lower extremity DVT, history of depression, peripheral arterial disease status post lower extremity stent placement presents to the ER on account of weakness over the past few days and fever since yesterday.  The patient has been at her usual state of health until few days ago when she developed progressive generalized body weakness and feeling of unwell.  She also developed fever yesterday.  She denies any dysuria or change in the color of urine.  No nausea or vomiting or diarrhea or constipation.  No abdominal pain.  She decided to come to the ER for further evaluation.    At the ER, urinalysis was positive for UTI.  She did have leukocytosis.  BNP indicates hyperkalemia with metabolic acidosis with acute kidney injury.  Patient was commenced on antibiotics and IV fluids.  She had improved at the time of my evaluation this morning.    PCP Antwon Melendez MD MD        Past Medical History:   Diagnosis Date    Chronic kidney disease, unspecified     Chronic venous stasis     Depression     DVT (deep vein thrombosis) in pregnancy     Hypertension     Lymphedema     Obesity     Peripheral vascular disease, unspecified         Past Surgical History:   Procedure Laterality Date    BILATERAL TUBAL LIGATION Bilateral     CHOLECYSTECTOMY      INSERTION OF BARE METAL STENT INTO PERIPHERAL ARTERY Bilateral     Lower extremities        Medications Prior to Admission   Medication Sig Dispense Refill Last Dose    amLODIPine (NORVASC) 10 MG tablet Take 5 mg by mouth once daily.       apixaban (ELIQUIS) 2.5 mg Tab Take 1 tablet (2.5 mg total) by mouth 2 (two) times daily. 60 tablet 11     carvediloL (COREG) 12.5 MG tablet Take 12.5 mg by mouth 2 (two) times daily.        "clopidogreL (PLAVIX) 75 mg tablet Take 75 mg by mouth once daily.       FLUoxetine 20 MG capsule Take 20 mg by mouth once daily.       pantoprazole (PROTONIX) 40 MG tablet Take 40 mg by mouth once daily at 6am.          Review of patient's allergies indicates:  No Known Allergies     Social History     Tobacco Use    Smoking status: Never    Smokeless tobacco: Never   Substance Use Topics    Alcohol use: Never        Family History   Problem Relation Name Age of Onset    Cancer Mother      Melanoma Mother      Hypertension Sister      Heart disease Sister      Dysrhythmia Sister         Review of Systems  Review of Systems   Constitutional; positive for fever generalized body weakness  HEENT: Negative for drooling, ear pain, facial swelling and nosebleeds.    Eyes: Negative for discharge and visual disturbance.   Respiratory: Negative for cough, negative shortness of breath.    Cardiovascular: negative for chest pain or SOB  Gastrointestinal: Negative for anal bleeding and rectal pain. Negative Nausea or Vomiting.  Genitourinary: Negative for decreased urine volume and dysuria  Musculoskeletal: Negative for neck pain.   Skin: Negative for rash.   Neurological: negative for numbness, negative for weakness,negative for seizures and facial asymmetry.              Objective:     I/O this shift:  In: 700 [I.V.:600; IV Piggyback:100]  Out: 550 [Urine:550]    /69   Pulse (!) 57   Temp 98.4 °F (36.9 °C) (Oral)   Resp 16   Ht 5' 10" (1.778 m)   Wt 136.1 kg (300 lb)   SpO2 98%   Breastfeeding No   BMI 43.05 kg/m²     General Appearance:    Awake, alert, not in acute distress   HEENT:   atraumatic, PERRL, EOM intact, conjuctiva pink, sclera anicteric, oropharynx moist, no lesions noted   Neck:    Supple, no JVD, no carotid bruits, no lymphadenopathy or thyromegaly noted       Pulmonary:   Clear to auscultation bilaterally, reduced breath sounds bileterally.   Cardiovascular:    Regular rate and rhythm, S1 S2 " "normal   Abdomen:     Soft, non-tender,nondistended, bowel sounds active all four quadrants, no masses, no organomegaly   Extremities:  Bilateral lower extremities edema on Unna boot, no cyanosis or clubbing noted       Skin:   No bruises noted.       Neurologic: Alert, awake, oriented x 3, moves all extremities.                 Data Review :   Labs:    CBC:   Lab Results   Component Value Date    WBC 14.41 (H) 08/09/2024    RBC 3.14 (L) 08/09/2024    HGB 8.0 (L) 08/09/2024    HCT 24.3 (L) 08/09/2024     08/09/2024     CMP:   Lab Results   Component Value Date     08/09/2024     01/05/2023    K 5.0 08/09/2024    K 4.7 01/05/2023     (H) 08/09/2024     01/05/2023    CO2 17 (L) 08/09/2024    CO2 24 01/05/2023    BUN 36.0 (H) 08/09/2024    BUN 27 (H) 01/05/2023    CREATININE 1.79 (H) 08/09/2024    CREATININE 1.25 01/05/2023    CALCIUM 8.2 (L) 08/09/2024    CALCIUM 8.3 (L) 01/05/2023    ALKPHOS 60 08/09/2024    AST 13 08/09/2024    ALT 9 08/09/2024    ALBUMIN 2.3 (L) 08/09/2024    BILITOT 0.4 08/09/2024     Cardiac markers:   Lab Results   Component Value Date    .0 (H) 07/31/2024       Radiology:  Micro:  No components found for: "BLOODCX", "SPUTUMCX", "URINECX"    Radiology:  [unfilled]        Assessment & Plan:   1. Sepsis; POA, secondary to urinary tract infection, continue IV cefepime and IV fluids.  Follow up blood culture and urine culture.  Patient is improving at this time..  CT of the abdomen shows no remarkable findings.    2. Urinary tract infection; POA, continue antibiotics as stated above.    3. Acute kidney injury with a baseline chronic kidney disease; stage IIIA, improving, continue IV fluids.  Patient requested for a repeat of renal ultrasound which was scheduled for her today at our facility.  Follow up repeat BMP.    4. Hypertension; blood pressure fairly stable, continue amlodipine and Coreg.    5. History of peripheral arterial disease status post lower " extremity stent placement, continue Plavix and Eliquis.    6. History of lower extremity DVT; continue Eliquis.    7. History of depression; continue home medications.    8. Morbid obesity; low-calorie diet has been advised      9. Maintain the patient on DVT prophylaxis by way of Eliquis    Code status; full code for now.    Disposition; admit to inpatient status.  This note was completed using voice recognition software and transcription errors may occur.    This Encounter was via Telemedicine (Both audio and visual ) and from [place], [place].  Patient was located in [place].    Evaluation  of the patient was done alongside the patient's nursing staff       Meagan Hernandez  8/9/2024  6:20 AM    84 years old female history of chronic renal failure was hospitalized for acute UTI     UTI was treated with IV cefepime     Patient felt much better this morning, no fever, no shortness for breath, no nausea

## 2024-08-09 NOTE — PLAN OF CARE
Problem: Adult Inpatient Plan of Care  Goal: Plan of Care Review  Outcome: Progressing  Goal: Patient-Specific Goal (Individualized)  Outcome: Progressing  Goal: Absence of Hospital-Acquired Illness or Injury  Outcome: Progressing  Goal: Optimal Comfort and Wellbeing  Outcome: Progressing  Goal: Readiness for Transition of Care  Outcome: Progressing     Problem: Bariatric Environmental Safety  Goal: Safety Maintained with Care  Outcome: Progressing     Problem: Acute Kidney Injury/Impairment  Goal: Fluid and Electrolyte Balance  Outcome: Progressing  Goal: Improved Oral Intake  Outcome: Progressing  Goal: Effective Renal Function  Outcome: Progressing     Problem: Pneumonia  Goal: Fluid Balance  Outcome: Progressing  Goal: Resolution of Infection Signs and Symptoms  Outcome: Progressing  Goal: Effective Oxygenation and Ventilation  Outcome: Progressing     Problem: Wound  Goal: Optimal Coping  Outcome: Progressing  Goal: Optimal Functional Ability  Outcome: Progressing  Goal: Absence of Infection Signs and Symptoms  Outcome: Progressing  Goal: Improved Oral Intake  Outcome: Progressing  Goal: Optimal Pain Control and Function  Outcome: Progressing  Goal: Skin Health and Integrity  Outcome: Progressing  Goal: Optimal Wound Healing  Outcome: Progressing     Problem: Fall Injury Risk  Goal: Absence of Fall and Fall-Related Injury  Reactivated

## 2024-08-09 NOTE — ED PROVIDER NOTES
Encounter Date: 8/8/2024       History     Chief Complaint   Patient presents with    Fall     Pt brought in by [facility] with c/o fall and fever.     84-year-old  female with CKD, HTN, and lymphedema presents to the emergency room for fever, fatigue, and weakness that began suddenly today.  Patient was at home when she went to the bathroom and had some weakness and was help to the floor by her family members.  No falls, head injury, or pain anywhere.  Temp 103° at home no meds given.  Patient given 600 mL LR EN route with EMS.  No meds given.  Patient states she is not in pain and does not want to be here.  Denies nausea, vomiting, fever, chills, chest pain, or difficulty breathing.    The history is provided by the patient and the EMS personnel. No  was used.     Review of patient's allergies indicates:  No Known Allergies  Past Medical History:   Diagnosis Date    Chronic kidney disease, unspecified     Chronic venous stasis     Depression     Hypertension     Lymphedema     Obesity      Past Surgical History:   Procedure Laterality Date    CHOLECYSTECTOMY       Family History   Problem Relation Name Age of Onset    Cancer Mother       Social History     Tobacco Use    Smoking status: Never    Smokeless tobacco: Never   Substance Use Topics    Alcohol use: Never    Drug use: Never     Review of Systems   Constitutional:  Positive for fatigue and fever.   HENT: Negative.     Eyes: Negative.    Respiratory: Negative.     Cardiovascular: Negative.    Gastrointestinal: Negative.    Genitourinary: Negative.    Musculoskeletal: Negative.    Neurological: Negative.        Physical Exam     Initial Vitals [08/08/24 2037]   BP Pulse Resp Temp SpO2   (!) 121/44 70 (!) 27 (!) 102 °F (38.9 °C) (!) 94 %      MAP       --         Physical Exam    Nursing note and vitals reviewed.  Constitutional: She appears well-developed and well-nourished. She is not diaphoretic. No distress.   Morbid  obesity   HENT:   Right Ear: Tympanic membrane and external ear normal.   Left Ear: Tympanic membrane and external ear normal.   Nose: No mucosal edema or rhinorrhea.   Mouth/Throat: Oropharynx is clear and moist and mucous membranes are normal. No oropharyngeal exudate or posterior oropharyngeal edema.   Facial rubor.  Nontoxic in appearance though.  Non diaphoretic.   Eyes: EOM are normal. Pupils are equal, round, and reactive to light. Right eye exhibits no discharge. Left eye exhibits no discharge.   Neck: Neck supple.   Normal range of motion.  Cardiovascular:  Normal rate and regular rhythm.           Pulmonary/Chest: No respiratory distress. She has decreased breath sounds. She has no wheezes.   Limited due to body habitus   Abdominal: Abdomen is soft. Bowel sounds are normal. There is no abdominal tenderness.   Musculoskeletal:      Cervical back: Normal range of motion and neck supple.     Lymphadenopathy:        Head (right side): No submental and no tonsillar adenopathy present.        Head (left side): No submental and no tonsillar adenopathy present.     She has no cervical adenopathy.        Right cervical: No superficial cervical adenopathy present.       Left cervical: No superficial cervical adenopathy present.     She has no axillary adenopathy.   Skin: Rash noted. There is erythema.   Dry erythematous rash bilateral lower extremities. Wrapped in ER.     Goes to wound care for chronic lower leg edema/lymphedema.   Psychiatric: She has a normal mood and affect. Her speech is normal and behavior is normal. Judgment and thought content normal. Cognition and memory are normal.         ED Course   Procedures  Labs Reviewed   COMPREHENSIVE METABOLIC PANEL - Abnormal       Result Value    Sodium 137      Potassium 5.4 (*)     Chloride 112 (*)     CO2 16 (*)     Glucose 141 (*)     Blood Urea Nitrogen 38.0 (*)     Creatinine 1.94 (*)     Calcium 8.5      Protein Total 8.0 (*)     Albumin 2.5 (*)      Globulin 5.5 (*)     Albumin/Globulin Ratio 0.5 (*)     Bilirubin Total 0.3      ALP 65      ALT 9      AST 11      eGFR 25      Anion Gap 9.0      BUN/Creatinine Ratio 20     LACTIC ACID, PLASMA - Abnormal    Lactic Acid Level 2.5 (*)    CBC WITH DIFFERENTIAL - Abnormal    WBC 13.40 (*)     RBC 3.42 (*)     Hgb 8.6 (*)     Hct 26.0 (*)     MCV 76.0 (*)     MCH 25.1 (*)     MCHC 33.1      RDW 15.8      Platelet 348      MPV 9.8      Neut % 84.7      Lymph % 7.9      Mono % 6.4      Eos % 0.1      Basophil % 0.4      Lymph # 1.06      Neut # 11.34 (*)     Mono # 0.86      Eos # 0.02      Baso # 0.05      IG# 0.07 (*)     IG% 0.5     COVID/RSV/FLU A&B PCR - Normal    Influenza A PCR Not Detected      Influenza B PCR Not Detected      Respiratory Syncytial Virus PCR Not Detected      SARS-CoV-2 PCR Not Detected      Narrative:     The Xp[employer] Ngin Xpress SARS-CoV-2/FLU/RSV plus is a rapid, multiplexed real-time PCR test intended for the simultaneous qualitative detection and differentiation of SARS-CoV-2, Influenza A, Influenza B, and respiratory syncytial virus (RSV) viral RNA in either nasopharyngeal swab or nasal swab specimens.         BLOOD CULTURE [facility]   BLOOD CULTURE [facility]   CBC W/ AUTO DIFFERENTIAL    Narrative:     The following orders were created for panel order CBC auto differential.  Procedure                               Abnormality         Status                     ---------                               -----------         ------                     CBC with Differential[[id number]]       Abnormal            Final result                 Please view results for these tests on the individual orders.   URINALYSIS, REFLEX TO URINE CULTURE   LACTIC ACID, PLASMA     EKG Readings: (Independently Interpreted)   Initial Reading: No STEMI. Previous EKG: Compared with most recent EKG Rhythm: Normal Sinus Rhythm. Heart Rate: 70. Conduction: RBBB. ST Segments: Normal ST Segments. Axis: Left Axis Deviation.       Imaging  Results              X-Ray Chest AP Portable (Final result)  Result time 08/08/24 21:09:37      Final result by Jone Paz MD (08/08/24 21:09:37)                   Impression:      No acute cardiopulmonary process.      Electronically signed by: Jone Paz  Date:    08/08/2024  Time:    21:09               Narrative:    EXAMINATION:  XR CHEST AP PORTABLE    CLINICAL HISTORY:  Fever, unspecified    TECHNIQUE:  Single view of the chest    COMPARISON:  07/31/2024    FINDINGS:  No focal opacification, pleural effusion, or pneumothorax.    The cardiomediastinal silhouette remains prominent.    No acute osseous abnormality.                                       Medications   ceFEPIme (MAXIPIME) 1 g in D5W 100 mL IVPB (MB+) (has no administration in time range)   0.9%  NaCl infusion (1,000 mLs Intravenous New Bag 8/8/24 2045)   acetaminophen tablet 1,000 mg (1,000 mg Oral Given 8/8/24 2052)     Medical Decision Making  84-year-old  female with CKD, HTN, and lymphedema presents to the emergency room for fever, fatigue, and weakness that began suddenly today.  Patient was at home when she went to the bathroom and had some weakness and was help to the floor by her family members.  No falls, head injury, or pain anywhere.  Temp 103° at home no meds given.  Patient given 600 mL LR EN route with EMS.  No meds given.  Patient states she is not in pain and does not want to be here.  Denies nausea, vomiting, fever, chills, chest pain, or difficulty breathing.    Problems Addressed:  Fever: acute illness or injury     Details: Differential diagnosis included but not limited to:  Sepsis, urinary tract infection, urosepsis, COVID, flu, RSV, pneumonia    Amount and/or Complexity of Data Reviewed  Independent Historian: EMS  Labs: ordered. Decision-making details documented in ED Course.  Radiology: ordered. Decision-making details documented in ED Course.  ECG/medicine tests: ordered. Decision-making details  documented in ED Course.    Risk  OTC drugs.  Prescription drug management.               ED Course as of 08/08/24 2205   Thu Aug 08, 2024   2205 Admit for sepsis workup [ST]   2205 Abx ordered [ST]      ED Course User Index  [ST] Mila Perry PA                           Clinical Impression:  Final diagnoses:  [R50.9] Fever                 Mila Perry PA  08/08/24 2205

## 2024-08-09 NOTE — PROGRESS NOTES
[facility] - Medical Surgical Unit  Wound Care    Patient Name: Jil Graves  MRN: [id number]  Date: 8/9/2024  Diagnosis: <principal problem not specified>      Subjective:           Patient ID: Jil Graves is a 84 y.o. female.    Chief Complaint: Fall (Pt brought in by [employer] with c/o fall and fever.)      HPI      Past Medical History:     1. Diverticulosis of colon    2. Fever    3. Urinary tract infection without hematuria, site unspecified    4. Venous stasis ulcer, unspecified site, unspecified ulcer stage, unspecified whether varicose veins present    5. Hypertension, unspecified type    6. UTI (urinary tract infection)      Wound Assessment:               Plan:     Daily dressing changes per nursing staff, re-evaluation per wound care in 5-7 days.         Recommendations:    Left lower lateral leg: cleanse with wound cleanser, apply silver alginate to wound, cover with 4x4 gauze and wrap with kerlix, secure with tape, change daily        Time spent in room:     Cyndi Mejia RN

## 2024-08-09 NOTE — PLAN OF CARE
Problem: Adult Inpatient Plan of Care  Goal: Plan of Care Review  Outcome: Progressing  Goal: Patient-Specific Goal (Individualized)  Outcome: Progressing  Goal: Absence of Hospital-Acquired Illness or Injury  Outcome: Progressing  Goal: Optimal Comfort and Wellbeing  Outcome: Progressing  Goal: Readiness for Transition of Care  Outcome: Progressing     Problem: Bariatric Environmental Safety  Goal: Safety Maintained with Care  Outcome: Progressing     Problem: Acute Kidney Injury/Impairment  Goal: Fluid and Electrolyte Balance  Outcome: Progressing  Goal: Improved Oral Intake  Outcome: Progressing  Goal: Effective Renal Function  Outcome: Progressing     Problem: Pneumonia  Goal: Fluid Balance  Outcome: Progressing  Intervention: Monitor and Manage Fluid Balance  Flowsheets (Taken 8/9/2024 1722)  Fluid/Electrolyte Management:   intravenous fluids adjusted   fluids provided  Goal: Resolution of Infection Signs and Symptoms  Outcome: Progressing  Goal: Effective Oxygenation and Ventilation  Outcome: Progressing     Problem: Wound  Goal: Optimal Coping  Outcome: Progressing  Goal: Optimal Functional Ability  Outcome: Progressing  Intervention: Optimize Functional Ability  Flowsheets (Taken 8/9/2024 1722)  Activity Management:   Arm raise - L1   Rolling - L1  Activity Assistance Provided: assistance, 1 person  Goal: Absence of Infection Signs and Symptoms  Outcome: Progressing  Goal: Improved Oral Intake  Outcome: Progressing  Goal: Optimal Pain Control and Function  Outcome: Progressing  Goal: Skin Health and Integrity  Outcome: Progressing  Goal: Optimal Wound Healing  Outcome: Progressing     Problem: Fall Injury Risk  Goal: Absence of Fall and Fall-Related Injury  Outcome: Progressing

## 2024-08-09 NOTE — FIRST PROVIDER EVALUATION
Medical screening examination initiated.  I have conducted a focused provider triage encounter, findings are as follows:    Brief history of present illness:  84yoAAF with assisted fall to the ground and fever x 1episode today. Family says she had slip and fall today. Patient has no complaints. Patient c/o weakness.    There were no vitals filed for this visit.    Pertinent physical exam:  EMS stretcher.     Brief workup plan:  labs and imaging    Preliminary workup initiated; this workup will be continued and followed by the physician or advanced practice provider that is assigned to the patient when roomed.

## 2024-08-10 LAB
ANION GAP SERPL CALC-SCNC: 6 MEQ/L
BASOPHILS # BLD AUTO: 0.06 X10(3)/MCL
BASOPHILS NFR BLD AUTO: 0.4 %
BUN SERPL-MCNC: 28 MG/DL (ref 9.8–20.1)
CALCIUM SERPL-MCNC: 8.3 MG/DL (ref 8.4–10.2)
CHLORIDE SERPL-SCNC: 115 MMOL/L (ref 98–107)
CO2 SERPL-SCNC: 18 MMOL/L (ref 23–31)
CREAT SERPL-MCNC: 1.68 MG/DL (ref 0.55–1.02)
CREAT/UREA NIT SERPL: 17
EOSINOPHIL # BLD AUTO: 0.13 X10(3)/MCL (ref 0–0.9)
EOSINOPHIL NFR BLD AUTO: 0.9 %
ERYTHROCYTE [DISTWIDTH] IN BLOOD BY AUTOMATED COUNT: 15.9 % (ref 11.5–17)
GFR SERPLBLD CREATININE-BSD FMLA CKD-EPI: 30 ML/MIN/1.73/M2
GLUCOSE SERPL-MCNC: 117 MG/DL (ref 82–115)
HCT VFR BLD AUTO: 24.2 % (ref 37–47)
HGB BLD-MCNC: 8 G/DL (ref 12–16)
IMM GRANULOCYTES # BLD AUTO: 0.07 X10(3)/MCL (ref 0–0.04)
IMM GRANULOCYTES NFR BLD AUTO: 0.5 %
LYMPHOCYTES # BLD AUTO: 2.12 X10(3)/MCL (ref 0.6–4.6)
LYMPHOCYTES NFR BLD AUTO: 15.2 %
MCH RBC QN AUTO: 25.4 PG (ref 27–31)
MCHC RBC AUTO-ENTMCNC: 33.1 G/DL (ref 33–36)
MCV RBC AUTO: 76.8 FL (ref 80–94)
MONOCYTES # BLD AUTO: 1.59 X10(3)/MCL (ref 0.1–1.3)
MONOCYTES NFR BLD AUTO: 11.4 %
NEUTROPHILS # BLD AUTO: 9.97 X10(3)/MCL (ref 2.1–9.2)
NEUTROPHILS NFR BLD AUTO: 71.6 %
PLATELET # BLD AUTO: 303 X10(3)/MCL (ref 130–400)
PMV BLD AUTO: 10.4 FL (ref 7.4–10.4)
POTASSIUM SERPL-SCNC: 4.6 MMOL/L (ref 3.5–5.1)
RBC # BLD AUTO: 3.15 X10(6)/MCL (ref 4.2–5.4)
SODIUM SERPL-SCNC: 139 MMOL/L (ref 136–145)
WBC # BLD AUTO: 13.94 X10(3)/MCL (ref 4.5–11.5)

## 2024-08-10 PROCEDURE — 85025 COMPLETE CBC W/AUTO DIFF WBC: CPT | Performed by: INTERNAL MEDICINE

## 2024-08-10 PROCEDURE — 25000242 PHARM REV CODE 250 ALT 637 W/ HCPCS: Performed by: INTERNAL MEDICINE

## 2024-08-10 PROCEDURE — 25000003 PHARM REV CODE 250: Performed by: INTERNAL MEDICINE

## 2024-08-10 PROCEDURE — 21400001 HC TELEMETRY ROOM

## 2024-08-10 PROCEDURE — A4216 STERILE WATER/SALINE, 10 ML: HCPCS | Performed by: INTERNAL MEDICINE

## 2024-08-10 PROCEDURE — 63600175 PHARM REV CODE 636 W HCPCS: Performed by: INTERNAL MEDICINE

## 2024-08-10 PROCEDURE — 99900035 HC TECH TIME PER 15 MIN (STAT)

## 2024-08-10 PROCEDURE — 80048 BASIC METABOLIC PNL TOTAL CA: CPT | Performed by: INTERNAL MEDICINE

## 2024-08-10 PROCEDURE — 97530 THERAPEUTIC ACTIVITIES: CPT

## 2024-08-10 PROCEDURE — 94640 AIRWAY INHALATION TREATMENT: CPT

## 2024-08-10 PROCEDURE — 97161 PT EVAL LOW COMPLEX 20 MIN: CPT

## 2024-08-10 PROCEDURE — 94761 N-INVAS EAR/PLS OXIMETRY MLT: CPT

## 2024-08-10 PROCEDURE — 36415 COLL VENOUS BLD VENIPUNCTURE: CPT | Performed by: INTERNAL MEDICINE

## 2024-08-10 RX ORDER — CLONIDINE HYDROCHLORIDE 0.2 MG/1
0.2 TABLET ORAL EVERY 6 HOURS PRN
Status: DISCONTINUED | OUTPATIENT
Start: 2024-08-10 | End: 2024-08-11 | Stop reason: HOSPADM

## 2024-08-10 RX ORDER — HYDRALAZINE HYDROCHLORIDE 20 MG/ML
10 INJECTION INTRAMUSCULAR; INTRAVENOUS EVERY 6 HOURS PRN
Status: DISCONTINUED | OUTPATIENT
Start: 2024-08-10 | End: 2024-08-11 | Stop reason: HOSPADM

## 2024-08-10 RX ORDER — NIFEDIPINE 30 MG/1
60 TABLET, EXTENDED RELEASE ORAL DAILY
Status: DISCONTINUED | OUTPATIENT
Start: 2024-08-10 | End: 2024-08-11 | Stop reason: HOSPADM

## 2024-08-10 RX ADMIN — FLUOXETINE 20 MG: 10 CAPSULE ORAL at 09:08

## 2024-08-10 RX ADMIN — SODIUM BICARBONATE 650 MG: 650 TABLET ORAL at 09:08

## 2024-08-10 RX ADMIN — ACETAMINOPHEN 650 MG: 325 TABLET, FILM COATED ORAL at 05:08

## 2024-08-10 RX ADMIN — CEFEPIME 1 G: 1 INJECTION, POWDER, FOR SOLUTION INTRAMUSCULAR; INTRAVENOUS at 09:08

## 2024-08-10 RX ADMIN — CLOPIDOGREL BISULFATE 75 MG: 75 TABLET ORAL at 09:08

## 2024-08-10 RX ADMIN — SODIUM BICARBONATE 650 MG: 650 TABLET ORAL at 02:08

## 2024-08-10 RX ADMIN — APIXABAN 2.5 MG: 2.5 TABLET, FILM COATED ORAL at 09:08

## 2024-08-10 RX ADMIN — CLONIDINE HYDROCHLORIDE 0.2 MG: 0.2 TABLET ORAL at 12:08

## 2024-08-10 RX ADMIN — Medication 10 ML: at 09:08

## 2024-08-10 RX ADMIN — IPRATROPIUM BROMIDE AND ALBUTEROL SULFATE 3 ML: .5; 3 SOLUTION RESPIRATORY (INHALATION) at 07:08

## 2024-08-10 RX ADMIN — NIFEDIPINE 60 MG: 30 TABLET, FILM COATED, EXTENDED RELEASE ORAL at 03:08

## 2024-08-10 RX ADMIN — CARVEDILOL 12.5 MG: 12.5 TABLET, FILM COATED ORAL at 09:08

## 2024-08-10 RX ADMIN — PANTOPRAZOLE SODIUM 40 MG: 40 TABLET, DELAYED RELEASE ORAL at 05:08

## 2024-08-10 RX ADMIN — IPRATROPIUM BROMIDE AND ALBUTEROL SULFATE 3 ML: .5; 3 SOLUTION RESPIRATORY (INHALATION) at 02:08

## 2024-08-10 RX ADMIN — Medication 10 ML: at 12:08

## 2024-08-10 RX ADMIN — BUDESONIDE 0.5 MG: 0.5 INHALANT RESPIRATORY (INHALATION) at 07:08

## 2024-08-10 RX ADMIN — AMLODIPINE BESYLATE 5 MG: 5 TABLET ORAL at 09:08

## 2024-08-10 NOTE — PT/OT/SLP EVAL
Physical Therapy Evaluation    Patient Name:  Jil Graves   MRN:  [id number]    Recommendations:     Discharge Recommendations: Moderate Intensity Therapy   Discharge Equipment Recommendations:     Barriers to discharge:  current medical status    Assessment:     Jil Graves is a 84 y.o. female admitted with a medical diagnosis of UTI, profound weakness and debility, sufferred a significant decline in function and a hx of  morbidly obese female with a medical history of hypertension, chronic kidney disease stage IIIA, bilateral lower extremities lymphedema on unna boot, chronic lower extremity DVT, history of depression, peripheral arterial disease status post lower extremity stent placement.  She presents with the following impairments/functional limitations: weakness, impaired endurance, impaired functional mobility, impaired self care skills, gait instability, impaired balance, BLE edema.    Rehab Prognosis: Good; patient would benefit from acute skilled PT services to address these deficits and reach maximum level of function.    Recent Surgery: * No surgery found *      Plan:     During this hospitalization, patient to be seen 5 x/week to address the identified rehab impairments via gait training, therapeutic activities, therapeutic exercises, neuromuscular re-education and progress toward the following goals:    Plan of Care Expires:       Subjective     Chief Complaint: Pt was able to amb in the home with a walker on her own until recently and now she reports she cannot even lift up her legs in the bed due weakness, swelling, pain.  Patient/Family Comments/goals: to rtn home  Pain/Comfort:       Patients cultural, spiritual, Gnosticist conflicts given the current situation:      Living Environment:  Pt lives at home with her daughter, ramp to enter and handicap accessible bathroom.  Prior to admission, patients level of function was limited distance household ambulator with walker and sponge bathes due  "to BLEs in unna boots for so long.  Equipment used at home: RW. Rollator, handicap ht toilet, grab bars .  DME owned (not currently used): cane.  Upon discharge, patient will have assistance from family.    Objective:     Communicated with pt prior to session.  Patient found HOB elevated with  agrees to participate but warns "I'm very weak"  upon PT entry to room.    General Precautions: Standard, fall  Orthopedic Precautions:    Braces:    Respiratory Status: Room air    Exams:  RLE ROM: Deficits: due to significant swelling, redness, pain, weakness  LLE ROM: Deficits: due to significant swelling redness, pain, weakness    Functional Mobility:  Bed Mobility:     Scooting: total assistance and of 2 persons  Supine to Sit: total assistance and of 2 persons      AM-PAC 6 CLICK MOBILITY  Total Score:        Treatment & Education:  Pt unable to get to EOB due to weakness, swelling, pain in the legs.  She states "maybe another day."  Pt was instructed in AROM ex to perform throughout the day in bed to rebuild strength endurance and decrease edema including ankle pumps, leg raises with heel slides, hip abduction.    Patient left HOB elevated with all lines intact and call button in reach.    GOALS:   Multidisciplinary Problems       Physical Therapy Goals          Problem: Physical Therapy    Goal Priority Disciplines Outcome Goal Variances Interventions   Physical Therapy Goal     PT, PT/OT Progressing     Description: 1.  Pt will be I HEP  2.  Pt will improve bed mob to Lin  3.  Pt will be able to stand with walker and amb to bathroom with one helper assist  4.  Pt will tf to chair with one helper assist                       History:     Past Medical History:   Diagnosis Date    Chronic kidney disease, unspecified     Chronic venous stasis     Depression     DVT (deep vein thrombosis) in pregnancy     Hypertension     Lymphedema     Obesity     Peripheral vascular disease, unspecified        Past Surgical History: "   Procedure Laterality Date    BILATERAL TUBAL LIGATION Bilateral     CHOLECYSTECTOMY      INSERTION OF BARE METAL STENT INTO PERIPHERAL ARTERY Bilateral     Lower extremities       Time Tracking:     PT Received On: 08/10/24  PT Start Time: 1100     PT Stop Time: 1130  PT Total Time (min): 30 min     Billable Minutes: Evaluation 15 and Therapeutic Activity 10  Therapeutic Exercise 5      08/10/2024

## 2024-08-10 NOTE — PROGRESS NOTES
Hospital Medicine Progress Note        Chief Complaint: Inpatient Follow-up for     HPI:   Patient is a 84 y.o. morbidly obese female with a medical history of hypertension, chronic kidney disease stage IIIA, bilateral lower extremities lymphedema on unna boot, chronic lower extremity DVT, history of depression, peripheral arterial disease status post lower extremity stent placement presents to the ER on account of weakness over the past few days and fever since yesterday.  The patient has been at her usual state of health until few days ago when she developed progressive generalized body weakness and feeling of unwell.  She also developed fever yesterday.  She denies any dysuria or change in the color of urine.  No nausea or vomiting or diarrhea or constipation.  No abdominal pain.  She decided to come to the ER for further evaluation.    At the ER, urinalysis was positive for UTI.  She did have leukocytosis.  BNP indicates hyperkalemia with metabolic acidosis with acute kidney injury.  Patient was commenced on antibiotics and IV fluids.  She had improved at the time of my evaluation this morning.    August 10, 2024-UTI was treated with IV cefepime, urine culture and blood culture shows Gram-negative isaac, blood PCR shows Enterococcus and E coli, WBC improved to 13 from 14, creatinine improved to 1.6 from 1.7, no fever, no shortness for breath    Case was discussed with patient's nurse and  on the floor.    Objective/physical exam:  General: In no acute distress, afebrile  Chest: Clear to auscultation bilaterally  Heart: RRR, +S1, S2, no appreciable murmur  Abdomen: Soft, nontender, BS +  MSK: Warm, no lower extremity edema, no clubbing or cyanosis, bilateral lower leg covered with a dressing  Neurologic: Alert and oriented x4, Cranial nerve II-XII intact, Strength 5/5 in all 4 extremities    VITAL SIGNS: 24 HRS MIN & MAX LAST   Temp  Min: 98.2 °F (36.8 °C)  Max: 102.7 °F (39.3 °C) 98.4 °F (36.9 °C)    BP  Min: 112/62  Max: 165/67 (!) 162/63   Pulse  Min: 57  Max: 74  (!) 57   Resp  Min: 18  Max: 22 20   SpO2  Min: 92 %  Max: 99 % (!) 94 %     I have reviewed the following labs:  Recent Labs   Lab 08/08/24  2101 08/09/24  0252 08/10/24  0253   WBC 13.40* 14.41* 13.94*   RBC 3.42* 3.14* 3.15*   HGB 8.6* 8.0* 8.0*   HCT 26.0* 24.3* 24.2*   MCV 76.0* 77.4* 76.8*   MCH 25.1* 25.5* 25.4*   MCHC 33.1 32.9* 33.1   RDW 15.8 15.7 15.9    316 303   MPV 9.8 10.1 10.4     Recent Labs   Lab 08/08/24  2101 08/09/24  0252 08/10/24  0253    138 139   K 5.4* 5.0 4.6   * 114* 115*   CO2 16* 17* 18*   BUN 38.0* 36.0* 28.0*   CREATININE 1.94* 1.79* 1.68*   CALCIUM 8.5 8.2* 8.3*   ALBUMIN 2.5* 2.3*  --    ALKPHOS 65 60  --    ALT 9 9  --    AST 11 13  --    BILITOT 0.3 0.4  --      Microbiology Results (last 7 days)       Procedure Component Value Units Date/Time    Blood Culture #2 **CANNOT BE ORDERED STAT** [[id number]]  (Normal) Collected: 08/08/24 2101    Order Status: Completed Specimen: Blood Updated: 08/10/24 0800     Blood Culture No Growth At 24 Hours    Urine culture [[id number]]  (Abnormal) Collected: 08/08/24 2306    Order Status: Completed Specimen: Urine Updated: 08/10/24 0702     Urine Culture >/= 100,000 colonies/ml Gram-negative Rods    BCID2 Panel [[id number]]  (Abnormal) Collected: 08/08/24 2101    Order Status: Completed Specimen: Blood Updated: 08/09/24 1751     CTX-M (ESBL ) Not Detected     IMP (Cabapenemase ) Not Detected     KPC resistance gene (Carbapenemase ) Not Detected     mcr-1 Not Detected     mecA ID N/A     Comment: Note: Antimicrobial resistance can occur via multiple mechanisms. A Not Detected result for antimicrobial resistance gene(s) does not indicate antimicrobial susceptibility. Subculturing is required for species identification and susceptibility testing of   isolates.        mecA/C and MREJ (MRSA) gene N/A     NDM (Carbapenemase ) Not  Detected     OXA-48-like (Carbapenemase ) Not Detected     Yazan/B (VRE gene) N/A     VIM (Carbapenemase ) Not Detected     Enterococcus faecalis Not Detected     Enterococcus faecium Not Detected     Listeria monocytogenes Not Detected     Staphylococcus spp. Not Detected     Staphylococcus aureus Not Detected     Staphylococcus epidermidis Not Detected     Staphylococcus lugdunensis Not Detected     Streptococcus spp. Not Detected     Streptococcus agalactiae (Group B) Not Detected     Streptococcus pneumoniae Not Detected     Streptococcus pyogenes (Group A) Not Detected     Acinetobacter calcoaceticus/baumannii complex Not Detected     Bacteroides fragilis Not Detected     Enterobacterales Detected     Enterobacter cloacae complex Not Detected     Escherichia coli Detected     Klebsiella aerogenes Not Detected     Klebsiella oxytoca Not Detected     Klebsiella pneumoniae group Not Detected     Proteus spp. Not Detected     Salmonella spp. Not Detected     Serratia marcescens Not Detected     Haemophilus influenzae Not Detected     Neisseria meningitidis Not Detected     Pseudomonas aeruginosa Not Detected     Stenotrophomonas maltophilia Not Detected     Candida albicans Not Detected     Candida auris Not Detected     Candida glabrata Not Detected     Candida krusei Not Detected     Candida parapsilosis Not Detected     Candida tropicalis Not Detected     Cryptococcus neoformans/gattii Not Detected    Narrative:      The [employer] BCID2 Panel is a multiplexed nucleic acid test intended for the use with Bench® 2.0 or Bench® KnCMiner Systems for the simultaneous qualitative detection and identification of multiple bacterial and yeast nucleic acids and select genetic determinants associated with antimicrobial resistance.  The [employer] BCID2 Panel test is performed directly on blood culture samples identified as positive by a continuous monitoring blood culture system.  Results are  intended to be interpreted in conjunction with Gram stain results.    Blood Culture #1 **CANNOT BE ORDERED STAT** [[id number]]  (Abnormal) Collected: 08/08/24 2101    Order Status: Completed Specimen: Blood Updated: 08/09/24 1711     GRAM STAIN Gram Negative Rods      Seen in gram stain of broth only      2 of 2 bottles positive             See below for Radiology    Assessment/Plan:  Sepsis   Acute UTI   Chronic renal failure-baseline creatinine 2.8   Hypertension   Peripheral arterial disease   History of lower extremity deep vein thrombosis   Depression   Morbid obesity    Continue IV cefepime   Continue Eliquis 2.5 mg p.o. b.i.d.   Continue Protonix for GI prophylaxis   Continue physical therapy   Check blood culture, urine culture   Check CBC BMP in a.m.    VTE prophylaxis:     Patient condition:  Stable/Fair/Guarded/ Serious/ Critical    Anticipated discharge and Disposition:         All diagnosis and differential diagnosis have been reviewed; assessment and plan has been documented; I have personally reviewed the labs and test results that are presently available; I have reviewed the patients medication list; I have reviewed the consulting providers response and recommendations. I have reviewed or attempted to review medical records based upon their availability    All of the patient's questions have been  addressed and answered. Patient's is agreeable to the above stated plan. I will continue to monitor closely and make adjustments to medical management as needed.    Portions of this note dictated using EMR integrated voice recognition software, and may be subject to voice recognition errors not corrected at proofreading. Please contact writer for clarification if needed.   _____________________________________________________________________    Malnutrition Status:    Scheduled Med:   amLODIPine  5 mg Oral Daily    apixaban  2.5 mg Oral BID    budesonide  0.5 mg Nebulization Q12H    carvediloL  12.5 mg Oral  BID    ceFEPime IV (PEDS and ADULTS)  1 g Intravenous Q12H    clopidogreL  75 mg Oral Daily    FLUoxetine  20 mg Oral Daily    pantoprazole  40 mg Oral Before breakfast    sodium bicarbonate  650 mg Oral TID    sodium chloride 0.9%  10 mL Intravenous Q8H      Continuous Infusions:     PRN Meds:    Current Facility-Administered Medications:     acetaminophen, 650 mg, Oral, Q8H PRN    albuterol-ipratropium, 3 mL, Nebulization, Q6H PRN    ondansetron, 4 mg, Intravenous, Q6H PRN     Radiology:  I have personally reviewed the following imaging and agree with the radiologist.     US Retroperitoneal Complete  EXAMINATION  US RETROPERITONEAL COMPLETE    CLINICAL HISTORY  aj/ckd;    TECHNIQUE  Multiplanar grayscale sonographic images were obtained of the retroperitoneum and pelvis.    COMPARISON  None available at the time of initial interpretation.    FINDINGS  Exam quality: adequate for evaluation    Kidneys: The right kidney measures 8.6 cm x 4.6 cm x 4.9 cm, with the left kidney 9.1 cm x 5.4 cm x 4.5 cm.  There is appearance of diffuse bilateral renal cortex thinning.  No masses or complex cysts are appreciated.  No collecting system dilatation.  No shadowing calcification is identified.  No perinephric collection or free abdominal fluid.    Bladder: Inadequately evaluated secondary to absence of luminal distension.    IMPRESSION  1. Diffuse thinning of the bilateral renal cortex, likely related to chronic kidney disease.  2. No evidence of acute sonographic abnormality.    Electronically signed by: Tani Wilson  Date:    08/09/2024  Time:    13:34  CT Renal Stone Study ABD Pelvis WO  EXAMINATION  CT RENAL STONE STUDY ABD PELVIS WO    CLINICAL HISTORY  Sepsis;    TECHNIQUE  Non-contrast helical-acquisition CT images were obtained and multiplanar reformats accomplished by a CT technologist at a separate workstation, pushed to PACS for physician review.    COMPARISON  None available at the time of initial  interpretation.    FINDINGS  Images were reviewed in soft tissue, lung, and bone windows.    Exam quality: Inherently limited evaluation of the abdominopelvic organs and vasculature secondary to lack of IV or GI contrast.    Lines/tubes: none visualized    Cardiopulmonary: Visualized heart chambers are normal volume.  No acute or focal abnormality of the included lower lung zones.  No significant pericardial or pleural fluid.    Hepatobiliary/Pancreas: No acute or focal liver abnormality.  The gallbladder is surgically absent.  No convincing obstructive biliary process.  There is no evidence of expansile pancreatic lesion, ductal dilatation, or peripancreatic inflammatory changes.    Spleen: No acute or focal abnormality.    Adrenals/: No suspicious adrenal or renal lesion.  Normal-variant duplicated left renal collecting system incidentally noted.  No radiodense nephroureterolithiasis or evidence of distal obstructive uropathy.  No focal bladder wall thickening or convincing intraluminal abnormality.  Visualized reproductive structures are without acute or suspicious focal process.    Esophagus/GI tract: The included lower esophagus is unremarkable.  No evidence of gastric outlet or small bowel obstruction. No acute inflammatory process or convincing focal lesion. Extensive sigmoid diverticulosis is present, without CT findings of acute diverticulitis or other associated complication.    Peritoneal/Extraperitoneal Spaces: No free fluid or air, and no drainable collections.  Scattered aortoiliac calcification is present.  There is no aneurysmal vessel dilatation.  Bilateral iliac vein stents incidentally noted.  No pathologic lyndon enlargement or necrotic process.    Musculoskeletal: Degenerative changes are noted throughout the visualized spinal column.  Additionally, there are severe chronic alterations of both hips with complete joint space loss.  No acute or destructive skeletal process is  identified.    IMPRESSION  1. No convincing acute abdominopelvic abnormality.  2. Chronic secondary details discussed above.  ==========    No significant discrepancy identified in relation to the teleradiology preliminary report.    RADIATION DOSE  Automated tube current modulation, weight-based exposure dosing, and/or iterative reconstruction technique utilized to reach lowest reasonably achievable exposure rate.    DLP: 1204 mGy*cm    Electronically signed by: Tani Wilson  Date:    08/09/2024  Time:    09:03      Yue Mallory MD  Department of Hospital Medicine   [facility]   08/10/2024

## 2024-08-10 NOTE — PLAN OF CARE
Problem: Adult Inpatient Plan of Care  Goal: Plan of Care Review  Outcome: Progressing  Goal: Patient-Specific Goal (Individualized)  Outcome: Progressing  Goal: Absence of Hospital-Acquired Illness or Injury  Outcome: Progressing  Goal: Optimal Comfort and Wellbeing  Outcome: Progressing  Goal: Readiness for Transition of Care  Outcome: Progressing     Problem: Bariatric Environmental Safety  Goal: Safety Maintained with Care  Outcome: Progressing     Problem: Acute Kidney Injury/Impairment  Goal: Fluid and Electrolyte Balance  Outcome: Progressing  Goal: Improved Oral Intake  Outcome: Progressing  Goal: Effective Renal Function  Outcome: Progressing     Problem: Pneumonia  Goal: Fluid Balance  Outcome: Progressing  Goal: Resolution of Infection Signs and Symptoms  Outcome: Progressing  Goal: Effective Oxygenation and Ventilation  Outcome: Progressing     Problem: Wound  Goal: Optimal Coping  Outcome: Progressing  Goal: Optimal Functional Ability  Outcome: Progressing  Goal: Absence of Infection Signs and Symptoms  Outcome: Progressing  Goal: Improved Oral Intake  Outcome: Progressing  Goal: Optimal Pain Control and Function  Outcome: Progressing  Goal: Skin Health and Integrity  Outcome: Progressing  Goal: Optimal Wound Healing  Outcome: Progressing     Problem: Fall Injury Risk  Goal: Absence of Fall and Fall-Related Injury  Outcome: Progressing     Problem: Skin Injury Risk Increased  Goal: Skin Health and Integrity  Outcome: Progressing

## 2024-08-11 VITALS
SYSTOLIC BLOOD PRESSURE: 109 MMHG | TEMPERATURE: 98 F | DIASTOLIC BLOOD PRESSURE: 50 MMHG | HEIGHT: 70 IN | RESPIRATION RATE: 20 BRPM | HEART RATE: 56 BPM | BODY MASS INDEX: 41.95 KG/M2 | WEIGHT: 293 LBS | OXYGEN SATURATION: 93 %

## 2024-08-11 PROBLEM — N39.0 URINARY TRACT INFECTION WITHOUT HEMATURIA: Status: ACTIVE | Noted: 2024-08-11

## 2024-08-11 LAB
ANION GAP SERPL CALC-SCNC: 6 MEQ/L
BACTERIA BLD CULT: ABNORMAL
BACTERIA UR CULT: ABNORMAL
BASOPHILS # BLD AUTO: 0.07 X10(3)/MCL
BASOPHILS NFR BLD AUTO: 0.5 %
BUN SERPL-MCNC: 28 MG/DL (ref 9.8–20.1)
CALCIUM SERPL-MCNC: 8.4 MG/DL (ref 8.4–10.2)
CHLORIDE SERPL-SCNC: 111 MMOL/L (ref 98–107)
CO2 SERPL-SCNC: 19 MMOL/L (ref 23–31)
CREAT SERPL-MCNC: 1.88 MG/DL (ref 0.55–1.02)
CREAT/UREA NIT SERPL: 15
EOSINOPHIL # BLD AUTO: 0.23 X10(3)/MCL (ref 0–0.9)
EOSINOPHIL NFR BLD AUTO: 1.8 %
ERYTHROCYTE [DISTWIDTH] IN BLOOD BY AUTOMATED COUNT: 15.6 % (ref 11.5–17)
GFR SERPLBLD CREATININE-BSD FMLA CKD-EPI: 26 ML/MIN/1.73/M2
GLUCOSE SERPL-MCNC: 129 MG/DL (ref 82–115)
GRAM STN SPEC: ABNORMAL
HCT VFR BLD AUTO: 23.5 % (ref 37–47)
HGB BLD-MCNC: 7.6 G/DL (ref 12–16)
IMM GRANULOCYTES # BLD AUTO: 0.06 X10(3)/MCL (ref 0–0.04)
IMM GRANULOCYTES NFR BLD AUTO: 0.5 %
LYMPHOCYTES # BLD AUTO: 1.95 X10(3)/MCL (ref 0.6–4.6)
LYMPHOCYTES NFR BLD AUTO: 15.2 %
MCH RBC QN AUTO: 24.7 PG (ref 27–31)
MCHC RBC AUTO-ENTMCNC: 32.3 G/DL (ref 33–36)
MCV RBC AUTO: 76.3 FL (ref 80–94)
MONOCYTES # BLD AUTO: 1.6 X10(3)/MCL (ref 0.1–1.3)
MONOCYTES NFR BLD AUTO: 12.5 %
NEUTROPHILS # BLD AUTO: 8.92 X10(3)/MCL (ref 2.1–9.2)
NEUTROPHILS NFR BLD AUTO: 69.5 %
PLATELET # BLD AUTO: 268 X10(3)/MCL (ref 130–400)
PMV BLD AUTO: 10.5 FL (ref 7.4–10.4)
POTASSIUM SERPL-SCNC: 4.7 MMOL/L (ref 3.5–5.1)
RBC # BLD AUTO: 3.08 X10(6)/MCL (ref 4.2–5.4)
SODIUM SERPL-SCNC: 136 MMOL/L (ref 136–145)
WBC # BLD AUTO: 12.83 X10(3)/MCL (ref 4.5–11.5)

## 2024-08-11 PROCEDURE — 25000003 PHARM REV CODE 250: Performed by: INTERNAL MEDICINE

## 2024-08-11 PROCEDURE — A4216 STERILE WATER/SALINE, 10 ML: HCPCS | Performed by: INTERNAL MEDICINE

## 2024-08-11 PROCEDURE — 94640 AIRWAY INHALATION TREATMENT: CPT

## 2024-08-11 PROCEDURE — 94761 N-INVAS EAR/PLS OXIMETRY MLT: CPT

## 2024-08-11 PROCEDURE — 25000242 PHARM REV CODE 250 ALT 637 W/ HCPCS: Performed by: INTERNAL MEDICINE

## 2024-08-11 PROCEDURE — 80048 BASIC METABOLIC PNL TOTAL CA: CPT | Performed by: INTERNAL MEDICINE

## 2024-08-11 PROCEDURE — 36415 COLL VENOUS BLD VENIPUNCTURE: CPT | Performed by: INTERNAL MEDICINE

## 2024-08-11 PROCEDURE — 99900035 HC TECH TIME PER 15 MIN (STAT)

## 2024-08-11 PROCEDURE — 85025 COMPLETE CBC W/AUTO DIFF WBC: CPT | Performed by: INTERNAL MEDICINE

## 2024-08-11 PROCEDURE — 63600175 PHARM REV CODE 636 W HCPCS: Performed by: INTERNAL MEDICINE

## 2024-08-11 RX ORDER — NITROFURANTOIN 25; 75 MG/1; MG/1
100 CAPSULE ORAL 2 TIMES DAILY
Qty: 20 CAPSULE | Refills: 0 | Status: SHIPPED | OUTPATIENT
Start: 2024-08-11

## 2024-08-11 RX ADMIN — IPRATROPIUM BROMIDE AND ALBUTEROL SULFATE 3 ML: .5; 3 SOLUTION RESPIRATORY (INHALATION) at 07:08

## 2024-08-11 RX ADMIN — FLUOXETINE 20 MG: 10 CAPSULE ORAL at 09:08

## 2024-08-11 RX ADMIN — Medication 10 ML: at 05:08

## 2024-08-11 RX ADMIN — CLOPIDOGREL BISULFATE 75 MG: 75 TABLET ORAL at 09:08

## 2024-08-11 RX ADMIN — BUDESONIDE 0.5 MG: 0.5 INHALANT RESPIRATORY (INHALATION) at 07:08

## 2024-08-11 RX ADMIN — CEFEPIME 1 G: 1 INJECTION, POWDER, FOR SOLUTION INTRAMUSCULAR; INTRAVENOUS at 09:08

## 2024-08-11 RX ADMIN — NIFEDIPINE 60 MG: 30 TABLET, FILM COATED, EXTENDED RELEASE ORAL at 09:08

## 2024-08-11 RX ADMIN — SODIUM BICARBONATE 650 MG: 650 TABLET ORAL at 09:08

## 2024-08-11 RX ADMIN — PANTOPRAZOLE SODIUM 40 MG: 40 TABLET, DELAYED RELEASE ORAL at 05:08

## 2024-08-11 RX ADMIN — APIXABAN 2.5 MG: 2.5 TABLET, FILM COATED ORAL at 09:08

## 2024-08-11 RX ADMIN — CARVEDILOL 12.5 MG: 12.5 TABLET, FILM COATED ORAL at 09:08

## 2024-08-11 NOTE — PLAN OF CARE
08/11/24 1131   Final Note   Assessment Type Final Discharge Note   Anticipated Discharge Disposition Home-Health   Post-Acute Status   Post-Acute Authorization Home Health   Home Health Status Set-up Complete/Auth obtained   Discharge Delays None known at this time     Patient discharged to home with daughter. Spoke to daughter over the phone who was upset about discharge. Offered for nurse or MD to call her. I messaged nurse and told her to contact daughter and reason. Patient is current with [facility] home [facility]. Sent referral over care port with AVS and wound orders, home health order and d/c summary

## 2024-08-11 NOTE — DISCHARGE SUMMARY
Hospital Medicine discharge summary      Chief Complaint: Inpatient Follow-up for     HPI:   Patient is a 84 y.o. morbidly obese female with a medical history of hypertension, chronic kidney disease stage IIIA, bilateral lower extremities lymphedema on unna boot, chronic lower extremity DVT, history of depression, peripheral arterial disease status post lower extremity stent placement presents to the ER on account of weakness over the past few days and fever since yesterday.  The patient has been at her usual state of health until few days ago when she developed progressive generalized body weakness and feeling of unwell.  She also developed fever yesterday.  She denies any dysuria or change in the color of urine.  No nausea or vomiting or diarrhea or constipation.  No abdominal pain.  She decided to come to the ER for further evaluation.    At the ER, urinalysis was positive for UTI.  She did have leukocytosis.  BNP indicates hyperkalemia with metabolic acidosis with acute kidney injury.  Patient was commenced on antibiotics and IV fluids.  She had improved at the time of my evaluation this morning.    August 10, 2024-UTI was treated with IV cefepime, urine culture and blood culture shows Gram-negative isaac, blood PCR shows Enterococcus and E coli, WBC improved to 13 from 14, creatinine improved to 1.6 from 1.7, no fever, no shortness for breath    August 11, 2024-urine culture shows E coli, no complaints, no fever, no shortness for breath, no nausea    The patient can go home today and have a follow-up with the primary care doctor     I gave the patient Macrobid prescription    Case was discussed with patient's nurse and  on the floor.    Objective/physical exam:  General: In no acute distress, afebrile  Chest: Clear to auscultation bilaterally  Heart: RRR, +S1, S2, no appreciable murmur  Abdomen: Soft, nontender, BS +  MSK: Warm, no lower extremity edema, no clubbing or cyanosis, bilateral lower leg  covered with a dressing  Neurologic: Alert and oriented x4, Cranial nerve II-XII intact, Strength 5/5 in all 4 extremities    VITAL SIGNS: 24 HRS MIN & MAX LAST   Temp  Min: 98.2 °F (36.8 °C)  Max: 100.6 °F (38.1 °C) 98.2 °F (36.8 °C)   BP  Min: 109/50  Max: 180/79 (!) 109/50   Pulse  Min: 56  Max: 71  (!) 56   Resp  Min: 18  Max: 22 20   SpO2  Min: 87 %  Max: 100 % (!) 93 %     I have reviewed the following labs:  Recent Labs   Lab 08/09/24  0252 08/10/24  0253 08/11/24  0243   WBC 14.41* 13.94* 12.83*   RBC 3.14* 3.15* 3.08*   HGB 8.0* 8.0* 7.6*   HCT 24.3* 24.2* 23.5*   MCV 77.4* 76.8* 76.3*   MCH 25.5* 25.4* 24.7*   MCHC 32.9* 33.1 32.3*   RDW 15.7 15.9 15.6    303 268   MPV 10.1 10.4 10.5*     Recent Labs   Lab 08/08/24  2101 08/09/24  0252 08/10/24  0253 08/11/24  0243    138 139 136   K 5.4* 5.0 4.6 4.7   * 114* 115* 111*   CO2 16* 17* 18* 19*   BUN 38.0* 36.0* 28.0* 28.0*   CREATININE 1.94* 1.79* 1.68* 1.88*   CALCIUM 8.5 8.2* 8.3* 8.4   ALBUMIN 2.5* 2.3*  --   --    ALKPHOS 65 60  --   --    ALT 9 9  --   --    AST 11 13  --   --    BILITOT 0.3 0.4  --   --      Microbiology Results (last 7 days)       Procedure Component Value Units Date/Time    Blood Culture #2 **CANNOT BE ORDERED STAT** [[id number]]  (Normal) Collected: 08/08/24 2101    Order Status: Completed Specimen: Blood Updated: 08/11/24 0800     Blood Culture No Growth At 48 Hours    Urine culture [[id number]]  (Abnormal)  (Susceptibility) Collected: 08/08/24 2306    Order Status: Completed Specimen: Urine Updated: 08/11/24 0645     Urine Culture >/= 100,000 colonies/ml Escherichia coli    Blood Culture #1 **CANNOT BE ORDERED STAT** [[id number]]  (Abnormal)  (Susceptibility) Collected: 08/08/24 2101    Order Status: Completed Specimen: Blood Updated: 08/11/24 0633     Blood Culture Escherichia coli     GRAM STAIN Gram Negative Rods      Seen in gram stain of broth only      2 of 2 bottles positive    BCID2 Panel [[id number]]   (Abnormal) Collected: 08/08/24 2101    Order Status: Completed Specimen: Blood Updated: 08/09/24 1751     CTX-M (ESBL ) Not Detected     IMP (Cabapenemase ) Not Detected     KPC resistance gene (Carbapenemase ) Not Detected     mcr-1 Not Detected     mecA ID N/A     Comment: Note: Antimicrobial resistance can occur via multiple mechanisms. A Not Detected result for antimicrobial resistance gene(s) does not indicate antimicrobial susceptibility. Subculturing is required for species identification and susceptibility testing of   isolates.        mecA/C and MREJ (MRSA) gene N/A     NDM (Carbapenemase ) Not Detected     OXA-48-like (Carbapenemase ) Not Detected     Yazan/B (VRE gene) N/A     VIM (Carbapenemase ) Not Detected     Enterococcus faecalis Not Detected     Enterococcus faecium Not Detected     Listeria monocytogenes Not Detected     Staphylococcus spp. Not Detected     Staphylococcus aureus Not Detected     Staphylococcus epidermidis Not Detected     Staphylococcus lugdunensis Not Detected     Streptococcus spp. Not Detected     Streptococcus agalactiae (Group B) Not Detected     Streptococcus pneumoniae Not Detected     Streptococcus pyogenes (Group A) Not Detected     Acinetobacter calcoaceticus/baumannii complex Not Detected     Bacteroides fragilis Not Detected     Enterobacterales Detected     Enterobacter cloacae complex Not Detected     Escherichia coli Detected     Klebsiella aerogenes Not Detected     Klebsiella oxytoca Not Detected     Klebsiella pneumoniae group Not Detected     Proteus spp. Not Detected     Salmonella spp. Not Detected     Serratia marcescens Not Detected     Haemophilus influenzae Not Detected     Neisseria meningitidis Not Detected     Pseudomonas aeruginosa Not Detected     Stenotrophomonas maltophilia Not Detected     Candida albicans Not Detected     Candida auris Not Detected     Candida glabrata Not Detected     Jes krusei  Not Detected     Candida parapsilosis Not Detected     Candida tropicalis Not Detected     Cryptococcus neoformans/gattii Not Detected    Narrative:      The [employer] BCID2 Panel is a multiplexed nucleic acid test intended for the use with "[employer]® 2.0 or "[employer]® Splash Systems for the simultaneous qualitative detection and identification of multiple bacterial and yeast nucleic acids and select genetic determinants associated with antimicrobial resistance.  The [employer] BCID2 Panel test is performed directly on blood culture samples identified as positive by a continuous monitoring blood culture system.  Results are intended to be interpreted in conjunction with Gram stain results.             See below for Radiology    Discharge diagnosis  Sepsis   Acute UTI   Chronic renal failure-baseline creatinine 2.8   Hypertension   Peripheral arterial disease   History of lower extremity deep vein thrombosis   Depression   Morbid obesity      Discharge condition-stable     Discharge destination-home-discharge time 30 minutes        VTE prophylaxis:     Patient condition:  Stable/Fair/Guarded/ Serious/ Critical    Anticipated discharge and Disposition:         All diagnosis and differential diagnosis have been reviewed; assessment and plan has been documented; I have personally reviewed the labs and test results that are presently available; I have reviewed the patients medication list; I have reviewed the consulting providers response and recommendations. I have reviewed or attempted to review medical records based upon their availability    All of the patient's questions have been  addressed and answered. Patient's is agreeable to the above stated plan. I will continue to monitor closely and make adjustments to medical management as needed.    Portions of this note dictated using EMR integrated voice recognition software, and may be subject to voice recognition errors not corrected at proofreading.  Please contact writer for clarification if needed.   _____________________________________________________________________    Malnutrition Status:    Scheduled Med:   apixaban  2.5 mg Oral BID    budesonide  0.5 mg Nebulization Q12H    carvediloL  12.5 mg Oral BID    ceFEPime IV (PEDS and ADULTS)  1 g Intravenous Q12H    clopidogreL  75 mg Oral Daily    FLUoxetine  20 mg Oral Daily    NIFEdipine  60 mg Oral Daily    pantoprazole  40 mg Oral Before breakfast    sodium bicarbonate  650 mg Oral TID    sodium chloride 0.9%  10 mL Intravenous Q8H      Continuous Infusions:     PRN Meds:    Current Facility-Administered Medications:     acetaminophen, 650 mg, Oral, Q8H PRN    albuterol-ipratropium, 3 mL, Nebulization, Q6H PRN    cloNIDine, 0.2 mg, Oral, Q6H PRN    hydrALAZINE, 10 mg, Intravenous, Q6H PRN    ondansetron, 4 mg, Intravenous, Q6H PRN     Radiology:  I have personally reviewed the following imaging and agree with the radiologist.     US Retroperitoneal Complete  EXAMINATION  US RETROPERITONEAL COMPLETE    CLINICAL HISTORY  aj/ckd;    TECHNIQUE  Multiplanar grayscale sonographic images were obtained of the retroperitoneum and pelvis.    COMPARISON  None available at the time of initial interpretation.    FINDINGS  Exam quality: adequate for evaluation    Kidneys: The right kidney measures 8.6 cm x 4.6 cm x 4.9 cm, with the left kidney 9.1 cm x 5.4 cm x 4.5 cm.  There is appearance of diffuse bilateral renal cortex thinning.  No masses or complex cysts are appreciated.  No collecting system dilatation.  No shadowing calcification is identified.  No perinephric collection or free abdominal fluid.    Bladder: Inadequately evaluated secondary to absence of luminal distension.    IMPRESSION  1. Diffuse thinning of the bilateral renal cortex, likely related to chronic kidney disease.  2. No evidence of acute sonographic abnormality.    Electronically signed by: Tani Wilson  Date:    08/09/2024  Time:    13:34  CT  Renal Stone Study ABD Pelvis WO  EXAMINATION  CT RENAL STONE STUDY ABD PELVIS WO    CLINICAL HISTORY  Sepsis;    TECHNIQUE  Non-contrast helical-acquisition CT images were obtained and multiplanar reformats accomplished by a CT technologist at a separate workstation, pushed to PACS for physician review.    COMPARISON  None available at the time of initial interpretation.    FINDINGS  Images were reviewed in soft tissue, lung, and bone windows.    Exam quality: Inherently limited evaluation of the abdominopelvic organs and vasculature secondary to lack of IV or GI contrast.    Lines/tubes: none visualized    Cardiopulmonary: Visualized heart chambers are normal volume.  No acute or focal abnormality of the included lower lung zones.  No significant pericardial or pleural fluid.    Hepatobiliary/Pancreas: No acute or focal liver abnormality.  The gallbladder is surgically absent.  No convincing obstructive biliary process.  There is no evidence of expansile pancreatic lesion, ductal dilatation, or peripancreatic inflammatory changes.    Spleen: No acute or focal abnormality.    Adrenals/: No suspicious adrenal or renal lesion.  Normal-variant duplicated left renal collecting system incidentally noted.  No radiodense nephroureterolithiasis or evidence of distal obstructive uropathy.  No focal bladder wall thickening or convincing intraluminal abnormality.  Visualized reproductive structures are without acute or suspicious focal process.    Esophagus/GI tract: The included lower esophagus is unremarkable.  No evidence of gastric outlet or small bowel obstruction. No acute inflammatory process or convincing focal lesion. Extensive sigmoid diverticulosis is present, without CT findings of acute diverticulitis or other associated complication.    Peritoneal/Extraperitoneal Spaces: No free fluid or air, and no drainable collections.  Scattered aortoiliac calcification is present.  There is no aneurysmal vessel dilatation.   Bilateral iliac vein stents incidentally noted.  No pathologic lyndon enlargement or necrotic process.    Musculoskeletal: Degenerative changes are noted throughout the visualized spinal column.  Additionally, there are severe chronic alterations of both hips with complete joint space loss.  No acute or destructive skeletal process is identified.    IMPRESSION  1. No convincing acute abdominopelvic abnormality.  2. Chronic secondary details discussed above.  ==========    No significant discrepancy identified in relation to the teleradiology preliminary report.    RADIATION DOSE  Automated tube current modulation, weight-based exposure dosing, and/or iterative reconstruction technique utilized to reach lowest reasonably achievable exposure rate.    DLP: 1204 mGy*cm    Electronically signed by: Tani Wilson  Date:    08/09/2024  Time:    09:03      Yue Mallory MD  Department of Hospital Medicine   [facility]   08/11/2024

## 2024-08-11 NOTE — PLAN OF CARE
Problem: Adult Inpatient Plan of Care  Goal: Plan of Care Review  Outcome: Met  Goal: Patient-Specific Goal (Individualized)  Outcome: Met  Goal: Absence of Hospital-Acquired Illness or Injury  Outcome: Met  Goal: Optimal Comfort and Wellbeing  Outcome: Met  Goal: Readiness for Transition of Care  Outcome: Met     Problem: Bariatric Environmental Safety  Goal: Safety Maintained with Care  Outcome: Met     Problem: Acute Kidney Injury/Impairment  Goal: Fluid and Electrolyte Balance  Outcome: Met  Goal: Improved Oral Intake  Outcome: Met  Goal: Effective Renal Function  Outcome: Met     Problem: Pneumonia  Goal: Fluid Balance  Outcome: Met  Goal: Resolution of Infection Signs and Symptoms  Outcome: Met  Goal: Effective Oxygenation and Ventilation  Outcome: Met     Problem: Wound  Goal: Optimal Coping  Outcome: Met  Goal: Optimal Functional Ability  Outcome: Met  Goal: Absence of Infection Signs and Symptoms  Outcome: Met  Goal: Improved Oral Intake  Outcome: Met  Goal: Optimal Pain Control and Function  Outcome: Met  Goal: Skin Health and Integrity  Outcome: Met  Goal: Optimal Wound Healing  Outcome: Met     Problem: Fall Injury Risk  Goal: Absence of Fall and Fall-Related Injury  Outcome: Met     Problem: Skin Injury Risk Increased  Goal: Skin Health and Integrity  Outcome: Met

## 2024-08-12 ENCOUNTER — PATIENT OUTREACH (OUTPATIENT)
Dept: ADMINISTRATIVE | Facility: CLINIC | Age: 84
End: 2024-08-12
Payer: MEDICARE

## 2024-08-12 NOTE — PROGRESS NOTES
C3 nurse attempted to contact Jil Graves  for a TCC post hospital discharge follow up call. No answer. Left voicemail with callback information. The patient does not have a scheduled HOSFU appointment. Unable to route message to PCP staff.

## 2024-08-13 NOTE — PROGRESS NOTES
C3 nurse spoke with Jil ANALISA Graves and daughter for a TCC post hospital discharge follow up call. The patient does not have a scheduled HOSFU appointment with Antwon Melendez MD  within 5-7 days post hospital discharge date 8/11/24. C3 nurse was unable to schedule HOSFU appointment in [facility]. Patient advised to contact their PCP to schedule a HOSPFU within 5-7 days.

## 2024-08-14 LAB — BACTERIA BLD CULT: NORMAL

## 2024-08-23 ENCOUNTER — HOSPITAL ENCOUNTER (INPATIENT)
Facility: HOSPITAL | Age: 84
LOS: 5 days | Discharge: HOME HEALTH | DRG: 280 | End: 2024-08-28
Attending: STUDENT IN AN ORGANIZED HEALTH CARE EDUCATION/TRAINING PROGRAM | Admitting: INTERNAL MEDICINE
Payer: MEDICARE

## 2024-08-23 DIAGNOSIS — R60.9 PITTING EDEMA: ICD-10-CM

## 2024-08-23 DIAGNOSIS — I50.9 CHF (CONGESTIVE HEART FAILURE): ICD-10-CM

## 2024-08-23 DIAGNOSIS — R06.02 SOB (SHORTNESS OF BREATH): ICD-10-CM

## 2024-08-23 DIAGNOSIS — I25.10 CAD (CORONARY ARTERY DISEASE): ICD-10-CM

## 2024-08-23 DIAGNOSIS — M79.89 LEG SWELLING: ICD-10-CM

## 2024-08-23 DIAGNOSIS — I50.9 CONGESTIVE HEART FAILURE, UNSPECIFIED HF CHRONICITY, UNSPECIFIED HEART FAILURE TYPE: Primary | ICD-10-CM

## 2024-08-23 DIAGNOSIS — R07.9 CHEST PAIN: ICD-10-CM

## 2024-08-23 LAB
ALBUMIN SERPL-MCNC: 2.8 G/DL (ref 3.4–4.8)
ALBUMIN/GLOB SERPL: 0.5 RATIO (ref 1.1–2)
ALP SERPL-CCNC: 104 UNIT/L (ref 40–150)
ALT SERPL-CCNC: 8 UNIT/L (ref 0–55)
ANION GAP SERPL CALC-SCNC: 6 MEQ/L
AST SERPL-CCNC: 13 UNIT/L (ref 5–34)
BACTERIA #/AREA URNS AUTO: ABNORMAL /HPF
BASOPHILS # BLD AUTO: 0.04 X10(3)/MCL
BASOPHILS NFR BLD AUTO: 0.5 %
BILIRUB SERPL-MCNC: 0.3 MG/DL
BILIRUB UR QL STRIP.AUTO: NEGATIVE
BNP BLD-MCNC: 432.2 PG/ML
BUN SERPL-MCNC: 21.9 MG/DL (ref 9.8–20.1)
CALCIUM SERPL-MCNC: 9 MG/DL (ref 8.4–10.2)
CHLORIDE SERPL-SCNC: 113 MMOL/L (ref 98–107)
CLARITY UR: CLEAR
CO2 SERPL-SCNC: 19 MMOL/L (ref 23–31)
COLOR UR AUTO: YELLOW
CREAT SERPL-MCNC: 1.85 MG/DL (ref 0.55–1.02)
CREAT/UREA NIT SERPL: 12
EOSINOPHIL # BLD AUTO: 0.21 X10(3)/MCL (ref 0–0.9)
EOSINOPHIL NFR BLD AUTO: 2.5 %
ERYTHROCYTE [DISTWIDTH] IN BLOOD BY AUTOMATED COUNT: 17.8 % (ref 11.5–17)
FLUAV AG UPPER RESP QL IA.RAPID: NOT DETECTED
FLUBV AG UPPER RESP QL IA.RAPID: NOT DETECTED
GFR SERPLBLD CREATININE-BSD FMLA CKD-EPI: 26 ML/MIN/1.73/M2
GLOBULIN SER-MCNC: 5.8 GM/DL (ref 2.4–3.5)
GLUCOSE SERPL-MCNC: 100 MG/DL (ref 82–115)
GLUCOSE UR QL STRIP: NORMAL
HCT VFR BLD AUTO: 28.2 % (ref 37–47)
HGB BLD-MCNC: 9.1 G/DL (ref 12–16)
HGB UR QL STRIP: ABNORMAL
HYALINE CASTS #/AREA URNS LPF: ABNORMAL /LPF
IMM GRANULOCYTES # BLD AUTO: 0.02 X10(3)/MCL (ref 0–0.04)
IMM GRANULOCYTES NFR BLD AUTO: 0.2 %
KETONES UR QL STRIP: NEGATIVE
LEUKOCYTE ESTERASE UR QL STRIP: 75
LYMPHOCYTES # BLD AUTO: 1.88 X10(3)/MCL (ref 0.6–4.6)
LYMPHOCYTES NFR BLD AUTO: 22.3 %
MCH RBC QN AUTO: 24.5 PG (ref 27–31)
MCHC RBC AUTO-ENTMCNC: 32.3 G/DL (ref 33–36)
MCV RBC AUTO: 75.8 FL (ref 80–94)
MONOCYTES # BLD AUTO: 0.64 X10(3)/MCL (ref 0.1–1.3)
MONOCYTES NFR BLD AUTO: 7.6 %
NEUTROPHILS # BLD AUTO: 5.64 X10(3)/MCL (ref 2.1–9.2)
NEUTROPHILS NFR BLD AUTO: 66.9 %
NITRITE UR QL STRIP: NEGATIVE
NRBC BLD AUTO-RTO: 0 %
PH UR STRIP: 5.5 [PH]
PLATELET # BLD AUTO: 251 X10(3)/MCL (ref 130–400)
PMV BLD AUTO: 10.4 FL (ref 7.4–10.4)
POTASSIUM SERPL-SCNC: 5.5 MMOL/L (ref 3.5–5.1)
PROT SERPL-MCNC: 8.6 GM/DL (ref 5.8–7.6)
PROT UR QL STRIP: ABNORMAL
RBC # BLD AUTO: 3.72 X10(6)/MCL (ref 4.2–5.4)
RBC #/AREA URNS AUTO: ABNORMAL /HPF
RSV A 5' UTR RNA NPH QL NAA+PROBE: NOT DETECTED
SARS-COV-2 RNA RESP QL NAA+PROBE: NOT DETECTED
SODIUM SERPL-SCNC: 138 MMOL/L (ref 136–145)
SP GR UR STRIP.AUTO: 1.01 (ref 1–1.03)
SQUAMOUS #/AREA URNS LPF: ABNORMAL /HPF
TROPONIN I SERPL-MCNC: 0.05 NG/ML (ref 0–0.04)
TROPONIN I SERPL-MCNC: 0.06 NG/ML (ref 0–0.04)
UROBILINOGEN UR STRIP-ACNC: NORMAL
WBC # BLD AUTO: 8.43 X10(3)/MCL (ref 4.5–11.5)
WBC #/AREA URNS AUTO: ABNORMAL /HPF

## 2024-08-23 PROCEDURE — 81001 URINALYSIS AUTO W/SCOPE: CPT | Performed by: STUDENT IN AN ORGANIZED HEALTH CARE EDUCATION/TRAINING PROGRAM

## 2024-08-23 PROCEDURE — 99285 EMERGENCY DEPT VISIT HI MDM: CPT | Mod: 25

## 2024-08-23 PROCEDURE — 93010 ELECTROCARDIOGRAM REPORT: CPT | Mod: ,,, | Performed by: INTERNAL MEDICINE

## 2024-08-23 PROCEDURE — 83880 ASSAY OF NATRIURETIC PEPTIDE: CPT | Performed by: STUDENT IN AN ORGANIZED HEALTH CARE EDUCATION/TRAINING PROGRAM

## 2024-08-23 PROCEDURE — 93005 ELECTROCARDIOGRAM TRACING: CPT

## 2024-08-23 PROCEDURE — 96374 THER/PROPH/DIAG INJ IV PUSH: CPT

## 2024-08-23 PROCEDURE — 84484 ASSAY OF TROPONIN QUANT: CPT | Performed by: STUDENT IN AN ORGANIZED HEALTH CARE EDUCATION/TRAINING PROGRAM

## 2024-08-23 PROCEDURE — 80053 COMPREHEN METABOLIC PANEL: CPT | Performed by: PHYSICIAN ASSISTANT

## 2024-08-23 PROCEDURE — 85025 COMPLETE CBC W/AUTO DIFF WBC: CPT | Performed by: PHYSICIAN ASSISTANT

## 2024-08-23 PROCEDURE — 0241U COVID/RSV/FLU A&B PCR: CPT | Performed by: NURSE PRACTITIONER

## 2024-08-23 PROCEDURE — 63600175 PHARM REV CODE 636 W HCPCS: Mod: JZ,JG | Performed by: NURSE PRACTITIONER

## 2024-08-23 PROCEDURE — 63600175 PHARM REV CODE 636 W HCPCS: Performed by: STUDENT IN AN ORGANIZED HEALTH CARE EDUCATION/TRAINING PROGRAM

## 2024-08-23 PROCEDURE — 11000001 HC ACUTE MED/SURG PRIVATE ROOM

## 2024-08-23 RX ORDER — CLINDAMYCIN PHOSPHATE 900 MG/50ML
900 INJECTION, SOLUTION INTRAVENOUS ONCE
Status: COMPLETED | OUTPATIENT
Start: 2024-08-23 | End: 2024-08-23

## 2024-08-23 RX ORDER — NALOXONE HCL 0.4 MG/ML
0.02 VIAL (ML) INJECTION
Status: DISCONTINUED | OUTPATIENT
Start: 2024-08-23 | End: 2024-08-24

## 2024-08-23 RX ORDER — CLINDAMYCIN PHOSPHATE 900 MG/50ML
900 INJECTION, SOLUTION INTRAVENOUS
Status: DISCONTINUED | OUTPATIENT
Start: 2024-08-24 | End: 2024-08-24

## 2024-08-23 RX ORDER — IBUPROFEN 200 MG
16 TABLET ORAL
Status: DISCONTINUED | OUTPATIENT
Start: 2024-08-23 | End: 2024-08-24

## 2024-08-23 RX ORDER — ACETAMINOPHEN 500 MG
1000 TABLET ORAL EVERY 6 HOURS PRN
Status: DISCONTINUED | OUTPATIENT
Start: 2024-08-23 | End: 2024-08-28 | Stop reason: HOSPADM

## 2024-08-23 RX ORDER — IBUPROFEN 200 MG
24 TABLET ORAL
Status: DISCONTINUED | OUTPATIENT
Start: 2024-08-23 | End: 2024-08-24

## 2024-08-23 RX ORDER — FUROSEMIDE 10 MG/ML
40 INJECTION INTRAMUSCULAR; INTRAVENOUS
Status: COMPLETED | OUTPATIENT
Start: 2024-08-23 | End: 2024-08-23

## 2024-08-23 RX ORDER — SODIUM CHLORIDE 0.9 % (FLUSH) 0.9 %
10 SYRINGE (ML) INJECTION
Status: DISCONTINUED | OUTPATIENT
Start: 2024-08-23 | End: 2024-08-28 | Stop reason: HOSPADM

## 2024-08-23 RX ORDER — ACETAMINOPHEN 325 MG/1
650 TABLET ORAL EVERY 4 HOURS PRN
Status: DISCONTINUED | OUTPATIENT
Start: 2024-08-23 | End: 2024-08-28 | Stop reason: HOSPADM

## 2024-08-23 RX ORDER — GLUCAGON 1 MG
1 KIT INJECTION
Status: DISCONTINUED | OUTPATIENT
Start: 2024-08-23 | End: 2024-08-24

## 2024-08-23 RX ORDER — FUROSEMIDE 10 MG/ML
40 INJECTION INTRAMUSCULAR; INTRAVENOUS EVERY 12 HOURS
Status: DISCONTINUED | OUTPATIENT
Start: 2024-08-24 | End: 2024-08-28 | Stop reason: HOSPADM

## 2024-08-23 RX ORDER — ONDANSETRON HYDROCHLORIDE 2 MG/ML
4 INJECTION, SOLUTION INTRAVENOUS EVERY 4 HOURS PRN
Status: DISCONTINUED | OUTPATIENT
Start: 2024-08-23 | End: 2024-08-28 | Stop reason: HOSPADM

## 2024-08-23 RX ORDER — PROCHLORPERAZINE EDISYLATE 5 MG/ML
5 INJECTION INTRAMUSCULAR; INTRAVENOUS EVERY 6 HOURS PRN
Status: DISCONTINUED | OUTPATIENT
Start: 2024-08-23 | End: 2024-08-28 | Stop reason: HOSPADM

## 2024-08-23 RX ADMIN — FUROSEMIDE 40 MG: 10 INJECTION, SOLUTION INTRAMUSCULAR; INTRAVENOUS at 07:08

## 2024-08-23 RX ADMIN — CLINDAMYCIN PHOSPHATE 900 MG: 900 INJECTION, SOLUTION INTRAVENOUS at 09:08

## 2024-08-23 NOTE — ED NOTES
Lobby Round. Pt in main lobby alert and oriented at this time sitting in wheelchair with family at pt side. Pt denies any other complaints from initial assessment at this time. Pt does not appear to be in any immediate distress at this  time.

## 2024-08-23 NOTE — ED PROVIDER NOTES
Encounter Date: 8/23/2024    SCRIBE #1 NOTE: I, Juan R Blake, am scribing for, and in the presence of,  Dr. Henri Mcnulty IV, MD. I have scribed the following portions of the note - Other sections scribed: HPI, ROS, and PE..       History     Chief Complaint   Patient presents with    Leg Swelling     Left leg swelling since Monday.  Patient has a wound on that leg     Jil ANALISA Graves is a 84 y.o. female patient with a PMHx of CKD, chronic venous stasis, DVT, HTN who presents to the Emergency Department for evaluation of left leg swelling which onset gradually today. She has home health for chronic ulceration of BLE, she was sent by home health for increased swelling to left lower leg. Patient says swelling started Monday bilaterally to lower extremities which is not normal. She does not have any fluid medication listed on her chart but states she does take some. Associated symptoms include worsening SOB and productive cough with thick mucus. Patient denies any chest pain. SOB and cough not worsened when laying flat.    Follows with Dr. Derrick Lam MD, Vascular surgery.       The history is provided by the patient and a relative. No  was used.     Review of patient's allergies indicates:  No Known Allergies  Past Medical History:   Diagnosis Date    Chronic kidney disease, unspecified     Chronic venous stasis     Depression     DVT (deep vein thrombosis) in pregnancy     Hypertension     Lymphedema     Obesity     Peripheral vascular disease, unspecified      Past Surgical History:   Procedure Laterality Date    BILATERAL TUBAL LIGATION Bilateral     CHOLECYSTECTOMY      INSERTION OF BARE METAL STENT INTO PERIPHERAL ARTERY Bilateral     Lower extremities     Family History   Problem Relation Name Age of Onset    Cancer Mother      Melanoma Mother      Hypertension Sister      Heart disease Sister      Dysrhythmia Sister       Social History     Tobacco Use    Smoking status: Never    Smokeless  tobacco: Never   Substance Use Topics    Alcohol use: Never    Drug use: Never     Review of Systems   Constitutional:  Negative for chills and fever.   HENT:  Negative for congestion, rhinorrhea and sore throat.    Eyes:  Negative for visual disturbance.   Respiratory:  Positive for cough and shortness of breath.    Cardiovascular:  Positive for leg swelling. Negative for chest pain.   Gastrointestinal:  Negative for abdominal pain, nausea and vomiting.   Genitourinary:  Negative for dysuria, hematuria, vaginal bleeding and vaginal discharge.   Musculoskeletal:  Negative for joint swelling.   Skin:  Negative for rash.   Neurological:  Negative for weakness.   Psychiatric/Behavioral:  Negative for confusion.        Physical Exam     Initial Vitals [08/23/24 1241]   BP Pulse Resp Temp SpO2   (!) 150/56 (!) 55 20 98.1 °F (36.7 °C) 97 %      MAP       --         Physical Exam    Nursing note and vitals reviewed.  Constitutional: She is not diaphoretic. No distress.   HENT:   Head: Normocephalic and atraumatic.   Neck: Neck supple.   Normal range of motion.  Cardiovascular:  Normal rate and regular rhythm.           No murmur heard.  Pulmonary/Chest: Breath sounds normal. No respiratory distress.   Abdominal: Abdomen is soft. She exhibits no distension. There is no abdominal tenderness.   Musculoskeletal:      Cervical back: Normal range of motion and neck supple.      Right lower leg: 3+ Pitting Edema present.      Left lower leg: 3+ Pitting Edema present.      Comments: Both feet warm and well perfused  Dopplerable Dps   Left leg slightly more edematous.     Neurological: She is alert and oriented to person, place, and time. She has normal strength. No cranial nerve deficit or sensory deficit.   Skin: Skin is warm. Capillary refill takes less than 2 seconds.   Chronic wounds to left calf with no signs of infection, serosanguineous drainage; left leg warm and well perfused. Venostasis changes to BLE.   Psychiatric: She  has a normal mood and affect.         ED Course   Procedures  Labs Reviewed   COMPREHENSIVE METABOLIC PANEL - Abnormal       Result Value    Sodium 138      Potassium 5.5 (*)     Chloride 113 (*)     CO2 19 (*)     Glucose 100      Blood Urea Nitrogen 21.9 (*)     Creatinine 1.85 (*)     Calcium 9.0      Protein Total 8.6 (*)     Albumin 2.8 (*)     Globulin 5.8 (*)     Albumin/Globulin Ratio 0.5 (*)     Bilirubin Total 0.3            ALT 8      AST 13      eGFR 26      Anion Gap 6.0      BUN/Creatinine Ratio 12     CBC WITH DIFFERENTIAL - Abnormal    WBC 8.43      RBC 3.72 (*)     Hgb 9.1 (*)     Hct 28.2 (*)     MCV 75.8 (*)     MCH 24.5 (*)     MCHC 32.3 (*)     RDW 17.8 (*)     Platelet 251      MPV 10.4      Neut % 66.9      Lymph % 22.3      Mono % 7.6      Eos % 2.5      Basophil % 0.5      Lymph # 1.88      Neut # 5.64      Mono # 0.64      Eos # 0.21      Baso # 0.04      IG# 0.02      IG% 0.2      NRBC% 0.0     TROPONIN I - Abnormal    Troponin-I 0.054 (*)    B-TYPE NATRIURETIC PEPTIDE - Abnormal    Natriuretic Peptide 432.2 (*)    URINALYSIS, REFLEX TO URINE CULTURE - Abnormal    Color, UA Yellow      Appearance, UA Clear      Specific Gravity, UA 1.015      pH, UA 5.5      Protein, UA 1+ (*)     Glucose, UA Normal      Ketones, UA Negative      Blood, UA 1+ (*)     Bilirubin, UA Negative      Urobilinogen, UA Normal      Nitrites, UA Negative      Leukocyte Esterase, UA 75 (*)     RBC, UA 6-10 (*)     WBC, UA 0-5      Bacteria, UA Trace      Squamous Epithelial Cells, UA Trace      Hyaline Casts, UA 3-5 (*)    TROPONIN I - Abnormal    Troponin-I 0.059 (*)    CBC W/ AUTO DIFFERENTIAL    Narrative:     The following orders were created for panel order CBC auto differential.  Procedure                               Abnormality         Status                     ---------                               -----------         ------                     CBC with Differential[[id number]]       Abnormal             Final result                 Please view results for these tests on the individual orders.     EKG Readings: (Independently Interpreted)   Initial Reading: No STEMI. Rhythm: Normal Sinus Rhythm. Heart Rate: 56. Ectopy: No Ectopy. Conduction: RBBB. ST Segments: Normal ST Segments. T Waves: Normal. Axis: Normal.   17:09.       Imaging Results              X-Ray Chest AP Portable (Final result)  Result time 08/23/24 18:04:18      Final result by Virgil Barrera MD (08/23/24 18:04:18)                   Impression:      Mild pulmonary vascular congestion.      Electronically signed by: Virgil Barrera  Date:    08/23/2024  Time:    18:04               Narrative:    EXAMINATION:  XR CHEST AP PORTABLE    CLINICAL HISTORY:  Other specified soft tissue disorders    TECHNIQUE:  Frontal view(s) of the chest.    COMPARISON:  Radiography 08/08/2024    FINDINGS:  Cardiac silhouette upper normal in size.  The lungs are well-inflated.  Mild pulmonary vascular congestion.  No defined consolidation.  No significant pleural effusion or discernible pneumothorax.                                       Medications   furosemide injection 40 mg (has no administration in time range)   clindamycin in D5W 900 mg/50 mL IVPB 900 mg (900 mg Intravenous New Bag 8/23/24 2105)   furosemide injection 40 mg (40 mg Intravenous Given 8/23/24 1901)     Medical Decision Making  84-year-old with chronic venous stasis to both legs - presenting with acute on chronic swelling has now developed some shortness of breath  Exam as above, labs chest x-ray consistent with CHF slight troponin elevation discussed with [facility] will admit to hospitalist kiara      Differential diagnosis (including but not limited to):   Chf, aj, dvt       Problems Addressed:  Congestive heart failure, unspecified HF chronicity, unspecified heart failure type: acute illness or injury that poses a threat to life or bodily functions  Leg swelling: acute illness or injury that poses a  threat to life or bodily functions  SOB (shortness of breath): acute illness or injury that poses a threat to life or bodily functions    Amount and/or Complexity of Data Reviewed  Labs: ordered.  Radiology: ordered and independent interpretation performed.     Details: CXR - venous congestion  ECG/medicine tests: ordered and independent interpretation performed.     Details: 17:09. No STEMI. Rhythm: Normal Sinus Rhythm. Heart Rate: 56. Ectopy: No Ectopy. Conduction: RBBB. ST Segments: Normal ST Segments. T Waves: Normal. Axis: Normal.      Discussion of management or test interpretation with external provider(s): [facility] - kiara bob  Hospitalist - will admit     Risk  Prescription drug management.  Decision regarding hospitalization.            Scribe Attestation:   Scribe #1: I performed the above scribed service and the documentation accurately describes the services I performed. I attest to the accuracy of the note.    Attending Attestation:           Physician Attestation for Scribe:  Physician Attestation Statement for Scribe #1: I, Dr. Henri Mcnulty IV, MD, reviewed documentation, as scribed by Juan R Blake in my presence, and it is both accurate and complete.             ED Course as of 08/23/24 2107   Fri Aug 23, 2024   1853 Paged [facility]. [GG]   1911 Discussed with [facility].  [GG]   1914 [facility] recommends diuresis repeat troponin if stable patient feels better on room air she was now she can be discharged with Lasix to go home with [AC]   2023 Paged Hospital Medicine.  [GG]   2048 Hospitalist will admit  [AC]      ED Course User Index  [AC] Henri Mcnulty IV, MD  [GG] Juan R Blake                           Clinical Impression:  Final diagnoses:  [M79.89] Leg swelling  [R06.02] SOB (shortness of breath)  [I50.9] Congestive heart failure, unspecified HF chronicity, unspecified heart failure type (Primary)          ED Disposition Condition    Admit                 Henri Mcnulty IV, MD  08/23/24 2107

## 2024-08-23 NOTE — ED NOTES
"Assumed care, patient c/o bilateral lower extremity swelling "more than usual", legs are wrapped from home health wound care nurse. Afebrile, drainage seeping through bandages. Denies pain. Aaox4, gcs 15. Daughter at bedside, patient uses wheelchair for mobility.   "

## 2024-08-24 LAB
ALBUMIN SERPL-MCNC: 2.7 G/DL (ref 3.4–4.8)
ALBUMIN/GLOB SERPL: 0.5 RATIO (ref 1.1–2)
ALP SERPL-CCNC: 103 UNIT/L (ref 40–150)
ALT SERPL-CCNC: 7 UNIT/L (ref 0–55)
ANION GAP SERPL CALC-SCNC: 10 MEQ/L
AST SERPL-CCNC: 15 UNIT/L (ref 5–34)
BASOPHILS # BLD AUTO: 0.06 X10(3)/MCL
BASOPHILS NFR BLD AUTO: 0.8 %
BILIRUB SERPL-MCNC: 0.4 MG/DL
BUN SERPL-MCNC: 21.1 MG/DL (ref 9.8–20.1)
CALCIUM SERPL-MCNC: 8.6 MG/DL (ref 8.4–10.2)
CHLORIDE SERPL-SCNC: 114 MMOL/L (ref 98–107)
CO2 SERPL-SCNC: 18 MMOL/L (ref 23–31)
CREAT SERPL-MCNC: 1.75 MG/DL (ref 0.55–1.02)
CREAT/UREA NIT SERPL: 12
EOSINOPHIL # BLD AUTO: 0.19 X10(3)/MCL (ref 0–0.9)
EOSINOPHIL NFR BLD AUTO: 2.5 %
ERYTHROCYTE [DISTWIDTH] IN BLOOD BY AUTOMATED COUNT: 17.2 % (ref 11.5–17)
GFR SERPLBLD CREATININE-BSD FMLA CKD-EPI: 28 ML/MIN/1.73/M2
GLOBULIN SER-MCNC: 5.6 GM/DL (ref 2.4–3.5)
GLUCOSE SERPL-MCNC: 102 MG/DL (ref 82–115)
HCT VFR BLD AUTO: 27.4 % (ref 37–47)
HGB BLD-MCNC: 8.8 G/DL (ref 12–16)
IMM GRANULOCYTES # BLD AUTO: 0.06 X10(3)/MCL (ref 0–0.04)
IMM GRANULOCYTES NFR BLD AUTO: 0.8 %
LYMPHOCYTES # BLD AUTO: 2.03 X10(3)/MCL (ref 0.6–4.6)
LYMPHOCYTES NFR BLD AUTO: 27.1 %
MCH RBC QN AUTO: 24.2 PG (ref 27–31)
MCHC RBC AUTO-ENTMCNC: 32.1 G/DL (ref 33–36)
MCV RBC AUTO: 75.5 FL (ref 80–94)
MONOCYTES # BLD AUTO: 0.64 X10(3)/MCL (ref 0.1–1.3)
MONOCYTES NFR BLD AUTO: 8.5 %
NEUTROPHILS # BLD AUTO: 4.51 X10(3)/MCL (ref 2.1–9.2)
NEUTROPHILS NFR BLD AUTO: 60.3 %
NRBC BLD AUTO-RTO: 0 %
PLATELET # BLD AUTO: 193 X10(3)/MCL (ref 130–400)
PMV BLD AUTO: 10.6 FL (ref 7.4–10.4)
POTASSIUM SERPL-SCNC: 5.3 MMOL/L (ref 3.5–5.1)
PROT SERPL-MCNC: 8.3 GM/DL (ref 5.8–7.6)
RBC # BLD AUTO: 3.63 X10(6)/MCL (ref 4.2–5.4)
SODIUM SERPL-SCNC: 142 MMOL/L (ref 136–145)
TROPONIN I SERPL-MCNC: 0.04 NG/ML (ref 0–0.04)
TROPONIN I SERPL-MCNC: 0.07 NG/ML (ref 0–0.04)
WBC # BLD AUTO: 7.49 X10(3)/MCL (ref 4.5–11.5)

## 2024-08-24 PROCEDURE — 25000003 PHARM REV CODE 250: Performed by: INTERNAL MEDICINE

## 2024-08-24 PROCEDURE — 25000003 PHARM REV CODE 250: Performed by: NURSE PRACTITIONER

## 2024-08-24 PROCEDURE — 63600175 PHARM REV CODE 636 W HCPCS: Performed by: NURSE PRACTITIONER

## 2024-08-24 PROCEDURE — 80053 COMPREHEN METABOLIC PANEL: CPT | Performed by: NURSE PRACTITIONER

## 2024-08-24 PROCEDURE — 84484 ASSAY OF TROPONIN QUANT: CPT | Performed by: NURSE PRACTITIONER

## 2024-08-24 PROCEDURE — 85025 COMPLETE CBC W/AUTO DIFF WBC: CPT | Performed by: NURSE PRACTITIONER

## 2024-08-24 PROCEDURE — 11000001 HC ACUTE MED/SURG PRIVATE ROOM

## 2024-08-24 PROCEDURE — 21400001 HC TELEMETRY ROOM

## 2024-08-24 RX ORDER — CARVEDILOL 12.5 MG/1
12.5 TABLET ORAL 2 TIMES DAILY
Status: DISCONTINUED | OUTPATIENT
Start: 2024-08-24 | End: 2024-08-28 | Stop reason: HOSPADM

## 2024-08-24 RX ORDER — PANTOPRAZOLE SODIUM 40 MG/1
40 TABLET, DELAYED RELEASE ORAL DAILY
Status: DISCONTINUED | OUTPATIENT
Start: 2024-08-24 | End: 2024-08-28 | Stop reason: HOSPADM

## 2024-08-24 RX ORDER — HYDRALAZINE HYDROCHLORIDE 20 MG/ML
20 INJECTION INTRAMUSCULAR; INTRAVENOUS EVERY 4 HOURS PRN
Status: DISCONTINUED | OUTPATIENT
Start: 2024-08-24 | End: 2024-08-28 | Stop reason: HOSPADM

## 2024-08-24 RX ORDER — VALSARTAN 160 MG/1
160 TABLET ORAL 2 TIMES DAILY
COMMUNITY

## 2024-08-24 RX ORDER — FLUOXETINE HYDROCHLORIDE 20 MG/1
20 CAPSULE ORAL DAILY
Status: DISCONTINUED | OUTPATIENT
Start: 2024-08-24 | End: 2024-08-28 | Stop reason: HOSPADM

## 2024-08-24 RX ORDER — CLOPIDOGREL BISULFATE 75 MG/1
75 TABLET ORAL DAILY
Status: DISCONTINUED | OUTPATIENT
Start: 2024-08-24 | End: 2024-08-28 | Stop reason: HOSPADM

## 2024-08-24 RX ORDER — FUROSEMIDE 40 MG/1
40 TABLET ORAL 2 TIMES DAILY
COMMUNITY

## 2024-08-24 RX ORDER — AMLODIPINE BESYLATE 5 MG/1
5 TABLET ORAL DAILY
COMMUNITY
Start: 2024-05-30

## 2024-08-24 RX ORDER — AMLODIPINE BESYLATE 5 MG/1
10 TABLET ORAL DAILY
Status: DISCONTINUED | OUTPATIENT
Start: 2024-08-24 | End: 2024-08-25

## 2024-08-24 RX ADMIN — CARVEDILOL 12.5 MG: 12.5 TABLET, FILM COATED ORAL at 08:08

## 2024-08-24 RX ADMIN — FUROSEMIDE 40 MG: 10 INJECTION, SOLUTION INTRAMUSCULAR; INTRAVENOUS at 06:08

## 2024-08-24 RX ADMIN — FLUOXETINE HYDROCHLORIDE 20 MG: 20 CAPSULE ORAL at 08:08

## 2024-08-24 RX ADMIN — APIXABAN 2.5 MG: 2.5 TABLET, FILM COATED ORAL at 08:08

## 2024-08-24 RX ADMIN — FUROSEMIDE 40 MG: 10 INJECTION, SOLUTION INTRAMUSCULAR; INTRAVENOUS at 05:08

## 2024-08-24 RX ADMIN — ACETAMINOPHEN 1000 MG: 500 TABLET ORAL at 04:08

## 2024-08-24 RX ADMIN — AMLODIPINE BESYLATE 10 MG: 5 TABLET ORAL at 08:08

## 2024-08-24 RX ADMIN — ACETAMINOPHEN 1000 MG: 500 TABLET ORAL at 12:08

## 2024-08-24 RX ADMIN — CLOPIDOGREL BISULFATE 75 MG: 75 TABLET ORAL at 08:08

## 2024-08-24 RX ADMIN — PANTOPRAZOLE SODIUM 40 MG: 40 TABLET, DELAYED RELEASE ORAL at 08:08

## 2024-08-24 NOTE — NURSING
Nurses Note -- 4 Eyes      8/24/2024   10:31 AM      Skin assessed during: Admit      [] No Altered Skin Integrity Present    []Prevention Measures Documented      [x] Yes- Altered Skin Integrity Present or Discovered   [x] LDA Added if Not in Epic (Describe Wound)   [x] New Altered Skin Integrity was Present on Admit and Documented in LDA   [x] Wound Image Taken    Wound Care Consulted? Yes    Attending Nurse:  Rosalba MARCANO LPN     Second RN/Staff Member:   Reynold TREVIZO RN

## 2024-08-24 NOTE — PROGRESS NOTES
[facility] [address]  Wound Care    Patient Name:  Jil Graves   MRN:  [id number]  Date: 8/24/2024  Diagnosis: <principal problem not specified>    History:     Past Medical History:   Diagnosis Date    Chronic kidney disease, unspecified     Chronic venous stasis     Depression     DVT (deep vein thrombosis) in pregnancy     Hypertension     Lymphedema     Obesity     Peripheral vascular disease, unspecified        Social History     Socioeconomic History    Marital status: Single   Tobacco Use    Smoking status: Never    Smokeless tobacco: Never   Substance and Sexual Activity    Alcohol use: Never    Drug use: Never     Social Determinants of Health     Financial Resource Strain: Low Risk  (8/1/2024)    Overall Financial Resource Strain (CARDIA)     Difficulty of Paying Living Expenses: Not very hard   Food Insecurity: No Food Insecurity (8/1/2024)    Hunger Vital Sign     Worried About Running Out of Food in the Last Year: Never true     Ran Out of Food in the Last Year: Never true   Transportation Needs: No Transportation Needs (8/1/2024)    TRANSPORTATION NEEDS     Transportation : No   Physical Activity: Inactive (8/1/2024)    Exercise Vital Sign     Days of Exercise per Week: 0 days     Minutes of Exercise per Session: 0 min   Stress: No Stress Concern Present (8/1/2024)    [employer] - Occupational Stress Questionnaire     Feeling of Stress : Only a little   Housing Stability: Low Risk  (8/1/2024)    Housing Stability Vital Sign     Unable to Pay for Housing in the Last Year: No     Homeless in the Last Year: No       Precautions:     Allergies as of 08/23/2024    (No Known Allergies)       WO Assessment Details/Treatment      08/24/24 0830   WOCN Assessment   Visit Date 08/24/24   Visit Time 0830   Consult Type New   WOCN Speciality Wound   Intervention chart review;assessed;applied;orders   Teaching on-going        Wound 08/23/24 1605 Venous  Ulcer Left distal;anterior Leg #1   Date First Assessed/Time First Assessed: 08/23/24 1605   Present on Original Admission: Yes  Primary Wound Type: Venous Ulcer  Side: Left  Orientation: distal;anterior  Location: Leg  Wound Number: #1  Pulses: pedal pulse of 65 with doppler   Wound Image     Dressing Appearance Intact;Moist drainage   Drainage Amount Small   Drainage Characteristics/Odor No odor;Serosanguineous   Appearance Pink;Red;Yellow;Moist   Tissue loss description Full thickness   Black (%), Wound Tissue Color 0 %   Red (%), Wound Tissue Color 20 %   Yellow (%), Wound Tissue Color 80 %   Periwound Area Macerated;Pale white;Pink;Blistered   Wound Edges Defined;Irregular   Wound Length (cm) 13 cm   Wound Width (cm) 9.9 cm   Wound Surface Area (cm^2) 128.7 cm^2   Care Cleansed with:;Antimicrobial agent;Wound cleanser;Other (see comments)  (Vashe)   Dressing Applied;Calcium alginate;Silver;Absorptive Pad;Rolled gauze        Wound 08/23/24 1605 Venous Ulcer Right distal;inferior Leg #2   Date First Assessed/Time First Assessed: 08/23/24 1605   Present on Original Admission: Yes  Primary Wound Type: Venous Ulcer  Side: Right  Orientation: distal;inferior  Location: Leg  Wound Number: #2  Pulses: pedal pulse 55 with doppler   Wound Image    Dressing Appearance Intact;Moist drainage   Drainage Amount Small   Drainage Characteristics/Odor Serosanguineous   Appearance Pink;Red;Moist   Tissue loss description Partial thickness   Black (%), Wound Tissue Color 0 %   Red (%), Wound Tissue Color 100 %   Yellow (%), Wound Tissue Color 0 %   Periwound Area Dry;Other (see comments)  (Peeling)   Wound Edges Irregular;Defined  (2 wounds measured as one)   Wound Length (cm) 9 cm   Wound Width (cm) 11.2 cm   Wound Depth (cm) 0.1 cm   Wound Volume (cm^3) 10.08 cm^3   Wound Surface Area (cm^2) 100.8 cm^2   Care Cleansed with:;Antimicrobial agent;Wound cleanser;Other (see comments)  (Vashe)   Dressing Applied;Calcium  alginate;Silver;Gauze;Absorptive Pad;Rolled gauze     WOCN consulted for BLE. Discussed plan of care with nurse Fay at bedside. Introduced self and explained reason for visit, patient agreeable to be seen. Treatment recommendations to be put in place. Patient states home health comes to her home 3 times a week to care for leg wounds. HH previously had unna boots to legs. BLE: Cleanse with hibiclens, dry well. Apply aquacell ag silver pads to open, draining wounds, cover with abd pad, wrap with kerlix and then secure with medipore tape BID and PRN with soilage. Educated the patient on the importance of maintaining good hygiene regimen by remaining clean and dry, she verbalized understanding and stated her daughter lives with her and assists with care. MONI mattress ordered with [employer]. Nursing to continue with treatment recommendations and other preventative measures. Will follow up.   08/24/2024

## 2024-08-24 NOTE — CONSULTS
Inpatient consult to Cardiology  Consult performed by: Khoa Kwon ANP  Consult ordered by: Henri Mcnulty IV, MD  Reason for consult: HF        [facility]    Cardiology  Consult Note    Patient Name: Jil Graves  MRN: [id number]  Admission Date: 8/23/2024  Hospital Length of Stay: 1 days  Code Status: Full Code   Attending Provider: Jacqueline Broderick MD   Consulting Provider: ESME Samaniego  Primary Care Physician: Antwon Melendez MD  Principal Problem:<principal problem not specified>    Patient information was obtained from patient, past medical records, and ER records.     Subjective:     Chief Complaint/Reason for Consult: HF    HPI: Ms. Graves is an 83 y/o female who is known to [facility], Dr. Coburn. The patient has chronic BLE Venous Stasis with Non-Healing wounds. About 1 week prior to presentation she developed BLE worsening of swelling to BLE and Abd Distension. She developed SOB and presented to the ER. On arrival to the ER she was found to Pulmonary Vascular Congestion on EKG, BUN/Crea 21.1/1.75, H&H 8.8/27.4, K 5.5, .2, and a Troponin of 0.054. The patient was admitted to Hospital Medicine and [facility] was consulted for HF Management.     PMH: CKD, Chronic Venous Stasis, Depression, DVT, HTN, Chronic Lymphedema, OA, PAD  PSH: BTL, Cholecystectomy, Angiogram  Family History: Mother, D, Melanoma; Father, D, Unknown; Sister, D, CAD, HTN, HLD  Social History: Denies Illicit Drug, ETOH and Tobacco Use     Previous Cardiac Diagnostics:   ECHO 6.14.23:  The study quality is average.   The left ventricle is normal in size. Global left ventricular systolic function is normal. The left ventricular ejection fraction is 55%. The left ventricle diastolic function is impaired (Grade I) with normal left atrial pressure. Mild to moderate concentric left ventricular hypertrophy is present.  The left atrium is mildly enlarged based on BATSHEVA ~38.4ml/m².  Mild calcification of the aortic valve is noted with  adequate cuspal excursion.  Moderate (2+) tricuspid and mild to (1+) mitral and pulmonic regurgitation.   PASP ~59 mmHg.  PHTN is noted.    ECHO 12.28.22:  RVSP ~ 90 mmHg.   Left Ventricle: The left ventricular cavity size is normal. Hypertrophy   observed. The left ventricular wall motion is normal. Normal (55-65%)   ejection fraction. Left ventricular diastolic dysfunction.   Right Ventricle: The right ventricular cavity size is mildly dilated.   Left Atrium: Left atrium is mildly dilated. Right Atrium: Cavity is mildly   dilated. IVC/SVC: Elevated central venous pressure (10-20 mm Hg).   Mitral Valve: Mild to moderate mitral regurgitation. No stenosis.   Normal mitral valve structure. There is mild mitral annular calcification.   Tricuspid Valve: Valve structure is normal. No stenosis. Pulmonary   hypertension present. Moderate tricuspid regurgitation.   Pericardium: No pericardial effusion.     Review of patient's allergies indicates:  No Known Allergies  No current facility-administered medications on file prior to encounter.     Current Outpatient Medications on File Prior to Encounter   Medication Sig    amLODIPine (NORVASC) 10 MG tablet Take 5 mg by mouth once daily.    apixaban (ELIQUIS) 2.5 mg Tab Take 1 tablet (2.5 mg total) by mouth 2 (two) times daily.    carvediloL (COREG) 12.5 MG tablet Take 12.5 mg by mouth 2 (two) times daily.    clopidogreL (PLAVIX) 75 mg tablet Take 75 mg by mouth once daily.    FLUoxetine 20 MG capsule Take 20 mg by mouth once daily.    nitrofurantoin, macrocrystal-monohydrate, (MACROBID) 100 MG capsule Take 1 capsule (100 mg total) by mouth 2 (two) times daily.    pantoprazole (PROTONIX) 40 MG tablet Take 40 mg by mouth once daily at 6am.     Review of Systems   Constitutional:  Positive for fatigue.   Respiratory:  Positive for shortness of breath.    Cardiovascular:  Positive for leg swelling. Negative for chest pain and palpitations.   Skin:  Positive for wound.   All other  "systems reviewed and are negative.    Objective:     Vital Signs (Most Recent):  Temp: 98.9 °F (37.2 °C) (08/24/24 1019)  Pulse: 60 (08/24/24 1019)  Resp: 19 (08/24/24 0922)  BP: (!) 159/58 (08/24/24 1019)  SpO2: 99 % (08/24/24 1019) Vital Signs (24h Range):  Temp:  [98.1 °F (36.7 °C)-98.9 °F (37.2 °C)] 98.9 °F (37.2 °C)  Pulse:  [53-75] 60  Resp:  [16-20] 19  SpO2:  [94 %-100 %] 99 %  BP: (136-192)/(44-77) 159/58   Weight: 136.1 kg (300 lb)  Body mass index is 43.05 kg/m².  SpO2: 99 %       Intake/Output Summary (Last 24 hours) at 8/24/2024 1041  Last data filed at 8/24/2024 0018  Gross per 24 hour   Intake 50 ml   Output 1775 ml   Net -1725 ml     Lines/Drains/Airways       Peripheral Intravenous Line  Duration                  Peripheral IV - Single Lumen 08/23/24 1835 20 G Anterior;Left Forearm <1 day                  Significant Labs:   Chemistries:   Recent Labs   Lab 08/23/24  1432 08/23/24  1935 08/24/24  0156 08/24/24  0814     --  142  --    K 5.5*  --  5.3*  --    *  --  114*  --    CO2 19*  --  18*  --    BUN 21.9*  --  21.1*  --    CREATININE 1.85*  --  1.75*  --    CALCIUM 9.0  --  8.6  --    BILITOT 0.3  --  0.4  --    ALKPHOS 104  --  103  --    ALT 8  --  7  --    AST 13  --  15  --    GLUCOSE 100  --  102  --    TROPONINI 0.054* 0.059* 0.068* 0.035        CBC/Anemia Labs: Coags:    Recent Labs   Lab 08/23/24  1432 08/24/24  0156   WBC 8.43 7.49   HGB 9.1* 8.8*   HCT 28.2* 27.4*    193   MCV 75.8* 75.5*   RDW 17.8* 17.2*    No results for input(s): "PT", "INR", "APTT" in the last 168 hours.     Significant Imaging:  Imaging Results              X-Ray Chest AP Portable (Final result)  Result time 08/23/24 18:04:18      Final result by Virgil Barrera MD (08/23/24 18:04:18)                   Impression:      Mild pulmonary vascular congestion.      Electronically signed by: Virgil Barrera  Date:    08/23/2024  Time:    18:04               Narrative:    EXAMINATION:  XR CHEST AP " PORTABLE    CLINICAL HISTORY:  Other specified soft tissue disorders    TECHNIQUE:  Frontal view(s) of the chest.    COMPARISON:  Radiography 08/08/2024    FINDINGS:  Cardiac silhouette upper normal in size.  The lungs are well-inflated.  Mild pulmonary vascular congestion.  No defined consolidation.  No significant pleural effusion or discernible pneumothorax.                                    EKG:       Telemetry: SR    Physical Exam  Constitutional:       General: She is not in acute distress.     Appearance: Normal appearance. She is obese. She is ill-appearing.   HENT:      Mouth/Throat:      Mouth: Mucous membranes are moist.   Eyes:      Extraocular Movements: Extraocular movements intact.   Cardiovascular:      Rate and Rhythm: Normal rate and regular rhythm.   Pulmonary:      Effort: Pulmonary effort is normal. No respiratory distress.      Breath sounds: Examination of the right-lower field reveals rhonchi. Examination of the left-lower field reveals rhonchi. Rhonchi present.   Abdominal:      Palpations: Abdomen is soft.   Musculoskeletal:         General: Swelling (BLE Chronic Venous Stasis with Wound; Weeping) present.      Right lower leg: Edema present.      Left lower leg: Edema present.   Skin:     General: Skin is warm.   Neurological:      General: No focal deficit present.      Mental Status: She is alert and oriented to person, place, and time.   Psychiatric:         Behavior: Behavior normal.         Judgment: Judgment normal.       Home Medications:   No current facility-administered medications on file prior to encounter.     Current Outpatient Medications on File Prior to Encounter   Medication Sig Dispense Refill    amLODIPine (NORVASC) 10 MG tablet Take 5 mg by mouth once daily.      apixaban (ELIQUIS) 2.5 mg Tab Take 1 tablet (2.5 mg total) by mouth 2 (two) times daily. 60 tablet 11    carvediloL (COREG) 12.5 MG tablet Take 12.5 mg by mouth 2 (two) times daily.      clopidogreL (PLAVIX)  75 mg tablet Take 75 mg by mouth once daily.      FLUoxetine 20 MG capsule Take 20 mg by mouth once daily.      nitrofurantoin, macrocrystal-monohydrate, (MACROBID) 100 MG capsule Take 1 capsule (100 mg total) by mouth 2 (two) times daily. 20 capsule 0    pantoprazole (PROTONIX) 40 MG tablet Take 40 mg by mouth once daily at 6am.       Current Schedule Inpatient Medications:   amLODIPine  10 mg Oral Daily    apixaban  2.5 mg Oral BID    carvediloL  12.5 mg Oral BID    clopidogreL  75 mg Oral Daily    FLUoxetine  20 mg Oral Daily    furosemide (LASIX) injection  40 mg Intravenous Q12H    pantoprazole  40 mg Oral Daily     Continuous Infusions:    Assessment:   Acute on Chronic Systolic HF/EF 55%    - ECHO (6.14.24) - LVEF 55%, Grade I DD  HTN/HHD with Hx of CKD with Hx of Diastolic HF - Uncontrolled HTN  Anasarca  NSTEMI Type II due to HF and SUSU/CKD III-IV  Pulmonary HTN     - ECHO (6.14.23) - PASP about 59mmHg  Chronic Lymphedema  Chronic BLE Venous Statis Dermatitis with Stasis Ulcers - POA  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  MO  Chronic Anemia  Hx of DVT/Eliquis as OP  GERD  Depression   No Hx of GIB     Plan:   Agree with IV Diuresis   Accurate I&Os and Daily Weights  Continue Eliquis (Hx of DVT), Norvasc, BB and Plavix  ECHO Pending  Consider Wound Care Consult for BLE Wound Management  Will F/U ECHO with Recommendations  Labs and EKG in AM: CBC, CMP and Mg    Thank you for your consult.     Khoa Kwon, ESME  Cardiology  [facility]  08/24/2024

## 2024-08-24 NOTE — PROGRESS NOTES
[facility] Medicine Progress Note        Chief Complaint: Inpatient Follow-up for shortness of breadth    HPI:   This is an 84-year-old female medical history of morbid obesity BMI 43, Chronic HFpEF, CKD stage 3b-4, chronic BLE venous stasis and lymphedema with chronic blisters/wounds, venous stents and prior DVT on low-dose Eliquis and Plavix following with Wound Care and report wounds has been improving but over the past week had significant weight gain increasing swelling of both extremities abdominal distention and bloating as well as shortness breath. Denies any fever or chills.     On arrival to ED she was afebrile, hypertensive and saturating 94% on room air. Chest x-ray show cardiomegaly and pulmonary edema. Labs notable for hemoglobin 9.1, creatinine 1.85 likely above baseline around 1.5, potassium 5.5 and elevated troponin 0.054 with a flat trend. EKG on my review showed normal sinus rhythm and old RBBB. BLE venous ultrasound difficult study due to body habitus and edema but no evidence of in the visualized veins.     She was started on IV Lasix, cardiology was consulted and admitted to medicine service for further evaluation and management.    Interval Hx:   Afebrile.  Borderline asymptomatic bradycardia noted.  Blood pressure elevated.  She was alert, oriented, comfortably resting in the bed.  At baseline she lives with the family member, dependent on ADLs and IADLs due to morbid obesity and generalized weakness.  Uses walker for ambulation.  Sleeps in a recliner reports severe orthopnea.      Had good urine output since of presentation.  HGB stable, chronic microcytosis noted.  Platelets looking good.  Hyperkalemia seen with metabolic acidosis and hyperchloremia.  BUN and serum creatinine elevated as well.  Troponin trending down.  LFTs looking good. UA unremarkable .      TTE pending.  Lower extremity Doppler obtained at admission showed no significant DVT but  study was technically difficult due to her body habitus.      Objective/physical exam:  Vitals:    08/24/24 0835 08/24/24 0836 08/24/24 0904 08/24/24 0922   BP: (!) 185/44 (!) 185/44 (!) 177/54 (!) 169/63   Pulse:  (!) 56 (!) 55 (!) 55   Resp:  18 18 19   Temp:       TempSrc:       SpO2:  100% 96% 97%   Weight:       Height:         General: In no acute distress, afebrile  Respiratory:  Bilateral crepitations, bilateral rhonchi   Cardiovascular: S1, S2, no appreciable murmur, JVD  Abdomen: Soft, nontender, BS +  MSK: Warm, significant pitting lower extremity edema, erythematous , pigmented skin   Neurologic: Alert and oriented x4, limited mobility due to pedal edema  Lab Results   Component Value Date     08/24/2024    K 5.3 (H) 08/24/2024     (H) 08/24/2024    CO2 18 (L) 08/24/2024    BUN 21.1 (H) 08/24/2024    CREATININE 1.75 (H) 08/24/2024    CALCIUM 8.6 08/24/2024    ANIONGAP 9 01/05/2023    ESTGFRAFRICA 43 01/05/2023    EGFRNONAA 42 08/11/2021      Lab Results   Component Value Date    ALT 7 08/24/2024    AST 15 08/24/2024    ALKPHOS 103 08/24/2024    BILITOT 0.4 08/24/2024      Lab Results   Component Value Date    WBC 7.49 08/24/2024    HGB 8.8 (L) 08/24/2024    HCT 27.4 (L) 08/24/2024    MCV 75.5 (L) 08/24/2024     08/24/2024           Medications:   amLODIPine  10 mg Oral Daily    apixaban  2.5 mg Oral BID    carvediloL  12.5 mg Oral BID    clopidogreL  75 mg Oral Daily    FLUoxetine  20 mg Oral Daily    furosemide (LASIX) injection  40 mg Intravenous Q12H    pantoprazole  40 mg Oral Daily        Current Facility-Administered Medications:     acetaminophen, 1,000 mg, Oral, Q6H PRN    acetaminophen, 650 mg, Oral, Q4H PRN    hydrALAZINE, 20 mg, Intravenous, Q4H PRN    ondansetron, 4 mg, Intravenous, Q4H PRN    prochlorperazine, 5 mg, Intravenous, Q6H PRN    sodium chloride 0.9%, 10 mL, Intravenous, PRN     Assessment/Plan:    Acute on chronic HFpEF   Anasarca due to above, volume overload    NSTEMI likely type 2 due to above -improving  Chronic pulmonary hypertension  Chronic bilateral lower extremity lymphedema, venous stasis  Chronic lower extremity venous statis ulcer-POA  CKD stage 3/4    HX: Obesity, chronic anemia , VHD, ? AFib on Eliquis, PID s/p PCI, history of DVT, anxiety, depression    Plan:   -follow TTE.  Continue telemetry.  Continue amlodipine, Coreg, Plavix, IV Lasix 40 b.i.d..  Cardiology consulted.  Troponin trending down  -wound care   -we will review and renew other appropriate home meds  -PT OT.  Mobilize as tolerated      Critical care diagnosis:  Acute on chronic HFpEF, anasarca requiring IV diuresis  Critical care time: 50 minutes        Trenton Leonard MD

## 2024-08-24 NOTE — H&P
[facility] Hospital Medicine   History & Physical Note      Patient Name: Jil Graves  : 1940  MRN: [id number]  Patient Class: IP- Inpatient   Admission Date: 2024   Length of Stay: 0  Admitting Service:  Service  Attending Physician: Dr. Jacqueline Broderick  PCP: Antwon Melendez MD  History Source: Patient, patient's family, and/or EMR.   Face-to-Face encounter date: 2024  Code status: Full code        Screening for Social Drivers for health:  Patient screened for food insecurity, housing instability, transportation needs, utility difficulties, and interpersonal safety (select all that apply as identified as concern)  []Housing or Food  []Transportation Needs  []Utility Difficulties  []Interpersonal safety  [x]None      Chief Complaint   Leg Swelling (Left leg swelling since Monday.  Patient has a wound on that leg)      History of Present Illness   Jil Graves  is a 84 y.o. female who has PMH of HTN, CKD stage III, BLE lymphedema with chronic stasis ulcers, anxiety, depression, PAD, s/p stent placement, DVT; presents to the ED at [facility] on 2024 sent by home health services for worsening lower extremity swelling and edema since Monday and worsening lower extremity venous stasis wounds.  Patient does endorse generalized body aches and weakness and not feeling well over the past several days. Patient was recently admitted to our services from -2024 which she was treated for sepsis secondary to urinary tract infection.  Patient does report some mild shortness a breath with associated cough with thick sputum.  No reported chest pain, fever, chills, nausea, vomiting, diarrhea or any sick contacts.  Lab work reviewed demonstrated H&H 9.1/28.2, potassium 5.5, chloride 113, CO2 19, BUN 21.9, creatinine 1.85, .2, troponin 0.054 will repeat value 0.059, other indices unremarkable. Chest x-ray impression reviewed demonstrated mild pulmonary vascular  congestion.   Initial vital signs /56 pulse 65 respirations 20 temperature 98.1° F O2 saturation 97% on room air.  Patient received Lasix 40 mg IV push in the ED. ED provider did speak with Cardiology Services who will see patient in consultation.  Patient was started on IV clindamycin for bilateral lower extremity wounds.  Patient is admitted to hospital medicine services for further management.    ROS   Except as documented, all other systems reviewed and negative     Past Medical History     Past Medical History:   Diagnosis Date    Chronic kidney disease, unspecified     Chronic venous stasis     Depression     DVT (deep vein thrombosis) in pregnancy     Hypertension     Lymphedema     Obesity     Peripheral vascular disease, unspecified        Past Surgical History     Past Surgical History:   Procedure Laterality Date    BILATERAL TUBAL LIGATION Bilateral     CHOLECYSTECTOMY      INSERTION OF BARE METAL STENT INTO PERIPHERAL ARTERY Bilateral     Lower extremities       Social History   No reports of alcohol, tobacco, or illicit drug use   Lives with family   Has home health services    Family History   Reviewed and negative    Allergies   Patient has no known allergies.    Home Medications   As documented  Prior to Admission medications    Medication Sig Start Date End Date Taking? Authorizing Provider   amLODIPine (NORVASC) 10 MG tablet Take 5 mg by mouth once daily. 10/8/22   Provider, Historical   apixaban (ELIQUIS) 2.5 mg Tab Take 1 tablet (2.5 mg total) by mouth 2 (two) times daily. 8/1/24 8/1/25  Daren Hernandez MD   carvediloL (COREG) 12.5 MG tablet Take 12.5 mg by mouth 2 (two) times daily. 4/3/22   Provider, Historical   clopidogreL (PLAVIX) 75 mg tablet Take 75 mg by mouth once daily. 4/26/22   Provider, Historical   FLUoxetine 20 MG capsule Take 20 mg by mouth once daily. 4/2/22   Provider, Historical   nitrofurantoin, macrocrystal-monohydrate, (MACROBID) 100 MG capsule Take 1 capsule (100  "mg total) by mouth 2 (two) times daily. 8/11/24   Yue Mallory MD   pantoprazole (PROTONIX) 40 MG tablet Take 40 mg by mouth once daily at 6am. 4/26/22   Provider, Historical        Inpatient Medications   Scheduled Meds   clindamycin IV (PEDS and ADULTS)  900 mg Intravenous Once    [START ON 8/24/2024] clindamycin IV (PEDS and ADULTS)  900 mg Intravenous Q8H    [START ON 8/24/2024] furosemide (LASIX) injection  40 mg Intravenous Q12H     Continuous Infusions: see MAR    PRN Meds    Current Facility-Administered Medications:     acetaminophen, 1,000 mg, Oral, Q6H PRN    acetaminophen, 650 mg, Oral, Q4H PRN    dextrose 10%, 12.5 g, Intravenous, PRN    dextrose 10%, 25 g, Intravenous, PRN    glucagon (human recombinant), 1 mg, Intramuscular, PRN    glucose, 16 g, Oral, PRN    glucose, 24 g, Oral, PRN    naloxone, 0.02 mg, Intravenous, PRN    ondansetron, 4 mg, Intravenous, Q4H PRN    prochlorperazine, 5 mg, Intravenous, Q6H PRN    sodium chloride 0.9%, 10 mL, Intravenous, PRN    Physical Exam   Vital Signs  Temp:  [98.1 °F (36.7 °C)]   Pulse:  [55-75]   Resp:  [16-20]   BP: (136-170)/(52-77)   SpO2:  [94 %-100 %]    General:  Chronically ill-appearing elderly female; nontoxic NAD, Appears comfortable  HEENT: NC/AT  Neck:  No JVD  Chest: CTABL  CVS: Regular rhythm. Normal S1/S2, capillary refill brisk, edema bilateral lower extremities.  Abdomen:  Obese, nondistended, normoactive BS, soft and non-tender.  MSK: No obvious deformity, edema bilateral lower extremities generalized weakness   Skin: Warm and dry venous stasis ulcers to bilateral lower extremity, see media           Neuro: AAOx3, no focal neurological deficit  Psych: Flat affect, Cooperative    Labs     Recent Labs     08/23/24  1432   WBC 8.43   RBC 3.72*   HGB 9.1*   HCT 28.2*   MCV 75.8*   MCH 24.5*   MCHC 32.3*   RDW 17.8*        No results for input(s): "LACTIC" in the last 72 hours.  No results for input(s): "INR", "APTT", "D-DIMER" in the " "last 72 hours.  No results for input(s): "HGBA1C", "CHOL", "TRIG", "LDL", "VLDL", "HDL" in the last 72 hours.   Recent Labs     08/23/24  1432      K 5.5*   CO2 19*   BUN 21.9*   CREATININE 1.85*   GLUCOSE 100   CALCIUM 9.0   ALBUMIN 2.8*   GLOBULIN 5.8*   ALKPHOS 104   ALT 8   AST 13   BILITOT 0.3     Recent Labs     08/23/24  1432 08/23/24  1717 08/23/24  1935   BNP  --  432.2*  --    TROPONINI 0.054*  --  0.059*          Microbiology Results (last 7 days)       ** No results found for the last 168 hours. **           Imaging     X-Ray Chest AP Portable   Final Result      Mild pulmonary vascular congestion.         Electronically signed by: Virgil Barrera   Date:    08/23/2024   Time:    18:04        Assessment & Plan   ASSESSMENT:  Acute CHF exacerbation- with pulmonary vascular congestion,  elevated BNP, EF 55-65% 12/2022-POA   CKD stage IV- POA  Elevated troponin levels- POA  Exertional dyspnea with shortness a breath-POA   Cough-productive-POA   Venous stasis ulcers- POA  Anemia- chronic disease- POA  HTN-urgency-POA   PAD- s/p stent placement-POA  Myalgias- POA  Weakness- POA    PLAN:  Consult Cardiology Services appreciate assistance and recommendations   Consult wound care services appreciate assistance and recommendations   Fall precautions   Echocardiogram  Will get COVID, FLU, RSV  PT OT eval and treat   Lasix 40 mg IV push b.i.d.   Trend out troponin levels  Monitor blood counts   Resume home medication as deemed necessary   Repeat lab work in a.m.      -VTE Prophylaxis: Richardson Tirado FNP have reviewed and discussed the case with   Dr. Jacqueline Broderick. Please see the following addendum for further assessment and plan from there attending MD.  LUZ MARINA Rosales   08/23/2024       "

## 2024-08-25 LAB
ALBUMIN SERPL-MCNC: 2.6 G/DL (ref 3.4–4.8)
ALBUMIN/GLOB SERPL: 0.6 RATIO (ref 1.1–2)
ALP SERPL-CCNC: 92 UNIT/L (ref 40–150)
ALT SERPL-CCNC: 8 UNIT/L (ref 0–55)
ANION GAP SERPL CALC-SCNC: 8 MEQ/L
APICAL FOUR CHAMBER EJECTION FRACTION: 58 %
APICAL TWO CHAMBER EJECTION FRACTION: 74 %
AST SERPL-CCNC: 12 UNIT/L (ref 5–34)
AV INDEX (PROSTH): 0.81
AV MEAN GRADIENT: 5 MMHG
AV PEAK GRADIENT: 10 MMHG
AV VALVE AREA BY VELOCITY RATIO: 2.33 CM²
AV VALVE AREA: 2.54 CM²
AV VELOCITY RATIO: 0.74
BASOPHILS # BLD AUTO: 0.05 X10(3)/MCL
BASOPHILS NFR BLD AUTO: 0.7 %
BILIRUB SERPL-MCNC: 0.3 MG/DL
BSA FOR ECHO PROCEDURE: 2.59 M2
BUN SERPL-MCNC: 25.8 MG/DL (ref 9.8–20.1)
CALCIUM SERPL-MCNC: 8.2 MG/DL (ref 8.4–10.2)
CHLORIDE SERPL-SCNC: 113 MMOL/L (ref 98–107)
CO2 SERPL-SCNC: 20 MMOL/L (ref 23–31)
CREAT SERPL-MCNC: 2 MG/DL (ref 0.55–1.02)
CREAT/UREA NIT SERPL: 13
CV ECHO LV RWT: 0.5 CM
DOP CALC AO PEAK VEL: 1.55 M/S
DOP CALC AO VTI: 37.3 CM
DOP CALC LVOT AREA: 3.1 CM2
DOP CALC LVOT DIAMETER: 2 CM
DOP CALC LVOT PEAK VEL: 1.15 M/S
DOP CALC LVOT STROKE VOLUME: 94.83 CM3
DOP CALC MV VTI: 37.3 CM
DOP CALCLVOT PEAK VEL VTI: 30.2 CM
E WAVE DECELERATION TIME: 269 MSEC
E/A RATIO: 1.58
E/E' RATIO: 10.88 M/S
ECHO LV POSTERIOR WALL: 1.19 CM (ref 0.6–1.1)
EOSINOPHIL # BLD AUTO: 0.32 X10(3)/MCL (ref 0–0.9)
EOSINOPHIL NFR BLD AUTO: 4.4 %
ERYTHROCYTE [DISTWIDTH] IN BLOOD BY AUTOMATED COUNT: 17.4 % (ref 11.5–17)
FRACTIONAL SHORTENING: 42 % (ref 28–44)
GFR SERPLBLD CREATININE-BSD FMLA CKD-EPI: 24 ML/MIN/1.73/M2
GLOBULIN SER-MCNC: 4.6 GM/DL (ref 2.4–3.5)
GLUCOSE SERPL-MCNC: 95 MG/DL (ref 82–115)
HCT VFR BLD AUTO: 26.6 % (ref 37–47)
HGB BLD-MCNC: 8.3 G/DL (ref 12–16)
IMM GRANULOCYTES # BLD AUTO: 0.02 X10(3)/MCL (ref 0–0.04)
IMM GRANULOCYTES NFR BLD AUTO: 0.3 %
INTERVENTRICULAR SEPTUM: 1.21 CM (ref 0.6–1.1)
LEFT ATRIUM AREA SYSTOLIC (APICAL 2 CHAMBER): 20 CM2
LEFT ATRIUM AREA SYSTOLIC (APICAL 4 CHAMBER): 26.6 CM2
LEFT ATRIUM SIZE: 4.5 CM
LEFT ATRIUM VOLUME INDEX MOD: 27.4 ML/M2
LEFT ATRIUM VOLUME MOD: 67.9 CM3
LEFT INTERNAL DIMENSION IN SYSTOLE: 2.8 CM (ref 2.1–4)
LEFT VENTRICLE DIASTOLIC VOLUME INDEX: 43.15 ML/M2
LEFT VENTRICLE DIASTOLIC VOLUME: 107 ML
LEFT VENTRICLE END DIASTOLIC VOLUME APICAL 2 CHAMBER: 79 ML
LEFT VENTRICLE END DIASTOLIC VOLUME APICAL 4 CHAMBER: 89 ML
LEFT VENTRICLE END SYSTOLIC VOLUME APICAL 2 CHAMBER: 51.8 ML
LEFT VENTRICLE END SYSTOLIC VOLUME APICAL 4 CHAMBER: 80.9 ML
LEFT VENTRICLE MASS INDEX: 88 G/M2
LEFT VENTRICLE SYSTOLIC VOLUME INDEX: 11.9 ML/M2
LEFT VENTRICLE SYSTOLIC VOLUME: 29.6 ML
LEFT VENTRICULAR INTERNAL DIMENSION IN DIASTOLE: 4.79 CM (ref 3.5–6)
LEFT VENTRICULAR MASS: 218.41 G
LV LATERAL E/E' RATIO: 8.7 M/S
LV SEPTAL E/E' RATIO: 14.5 M/S
LVED V (TEICH): 107 ML
LVES V (TEICH): 29.6 ML
LVOT MG: 3 MMHG
LVOT MV: 0.77 CM/S
LYMPHOCYTES # BLD AUTO: 2.39 X10(3)/MCL (ref 0.6–4.6)
LYMPHOCYTES NFR BLD AUTO: 33 %
MAGNESIUM SERPL-MCNC: 2.1 MG/DL (ref 1.6–2.6)
MCH RBC QN AUTO: 24 PG (ref 27–31)
MCHC RBC AUTO-ENTMCNC: 31.2 G/DL (ref 33–36)
MCV RBC AUTO: 76.9 FL (ref 80–94)
MONOCYTES # BLD AUTO: 0.84 X10(3)/MCL (ref 0.1–1.3)
MONOCYTES NFR BLD AUTO: 11.6 %
MV MEAN GRADIENT: 2 MMHG
MV PEAK A VEL: 0.55 M/S
MV PEAK E VEL: 0.87 M/S
MV PEAK GRADIENT: 4 MMHG
MV STENOSIS PRESSURE HALF TIME: 72 MS
MV VALVE AREA BY CONTINUITY EQUATION: 2.54 CM2
MV VALVE AREA P 1/2 METHOD: 3.06 CM2
NEUTROPHILS # BLD AUTO: 3.62 X10(3)/MCL (ref 2.1–9.2)
NEUTROPHILS NFR BLD AUTO: 50 %
NRBC BLD AUTO-RTO: 0 %
OHS LV EJECTION FRACTION SIMPSONS BIPLANE MOD: 66 %
OHS QRS DURATION: 124 MS
OHS QRS DURATION: 128 MS
OHS QTC CALCULATION: 465 MS
OHS QTC CALCULATION: 486 MS
PISA TR MAX VEL: 3.08 M/S
PLATELET # BLD AUTO: 201 X10(3)/MCL (ref 130–400)
PMV BLD AUTO: 10.9 FL (ref 7.4–10.4)
POTASSIUM SERPL-SCNC: 5.5 MMOL/L (ref 3.5–5.1)
PROT SERPL-MCNC: 7.2 GM/DL (ref 5.8–7.6)
PV PEAK GRADIENT: 5 MMHG
PV PEAK VELOCITY: 1.1 M/S
RA PRESSURE ESTIMATED: 15 MMHG
RBC # BLD AUTO: 3.46 X10(6)/MCL (ref 4.2–5.4)
RV TB RVSP: 18 MMHG
SODIUM SERPL-SCNC: 141 MMOL/L (ref 136–145)
TDI LATERAL: 0.1 M/S
TDI SEPTAL: 0.06 M/S
TDI: 0.08 M/S
TR MAX PG: 38 MMHG
TRICUSPID ANNULAR PLANE SYSTOLIC EXCURSION: 2.08 CM
TV REST PULMONARY ARTERY PRESSURE: 53 MMHG
WBC # BLD AUTO: 7.24 X10(3)/MCL (ref 4.5–11.5)
Z-SCORE OF LEFT VENTRICULAR DIMENSION IN END DIASTOLE: -10.16
Z-SCORE OF LEFT VENTRICULAR DIMENSION IN END SYSTOLE: -8.16

## 2024-08-25 PROCEDURE — 63600175 PHARM REV CODE 636 W HCPCS: Performed by: NURSE PRACTITIONER

## 2024-08-25 PROCEDURE — 93010 ELECTROCARDIOGRAM REPORT: CPT | Mod: ,,, | Performed by: INTERNAL MEDICINE

## 2024-08-25 PROCEDURE — 36415 COLL VENOUS BLD VENIPUNCTURE: CPT | Performed by: NURSE PRACTITIONER

## 2024-08-25 PROCEDURE — 80053 COMPREHEN METABOLIC PANEL: CPT | Performed by: NURSE PRACTITIONER

## 2024-08-25 PROCEDURE — 83735 ASSAY OF MAGNESIUM: CPT | Performed by: NURSE PRACTITIONER

## 2024-08-25 PROCEDURE — 25000003 PHARM REV CODE 250: Performed by: INTERNAL MEDICINE

## 2024-08-25 PROCEDURE — 21400001 HC TELEMETRY ROOM

## 2024-08-25 PROCEDURE — 93005 ELECTROCARDIOGRAM TRACING: CPT

## 2024-08-25 PROCEDURE — 85025 COMPLETE CBC W/AUTO DIFF WBC: CPT | Performed by: NURSE PRACTITIONER

## 2024-08-25 RX ORDER — NIFEDIPINE 90 MG/1
90 TABLET, EXTENDED RELEASE ORAL DAILY
Status: DISCONTINUED | OUTPATIENT
Start: 2024-08-26 | End: 2024-08-27

## 2024-08-25 RX ADMIN — CARVEDILOL 12.5 MG: 12.5 TABLET, FILM COATED ORAL at 08:08

## 2024-08-25 RX ADMIN — PANTOPRAZOLE SODIUM 40 MG: 40 TABLET, DELAYED RELEASE ORAL at 08:08

## 2024-08-25 RX ADMIN — CLOPIDOGREL BISULFATE 75 MG: 75 TABLET ORAL at 08:08

## 2024-08-25 RX ADMIN — FUROSEMIDE 40 MG: 10 INJECTION, SOLUTION INTRAMUSCULAR; INTRAVENOUS at 05:08

## 2024-08-25 RX ADMIN — SODIUM ZIRCONIUM CYCLOSILICATE 10 G: 10 POWDER, FOR SUSPENSION ORAL at 08:08

## 2024-08-25 RX ADMIN — SODIUM ZIRCONIUM CYCLOSILICATE 10 G: 10 POWDER, FOR SUSPENSION ORAL at 02:08

## 2024-08-25 RX ADMIN — AMLODIPINE BESYLATE 10 MG: 5 TABLET ORAL at 08:08

## 2024-08-25 RX ADMIN — APIXABAN 2.5 MG: 2.5 TABLET, FILM COATED ORAL at 08:08

## 2024-08-25 RX ADMIN — FLUOXETINE HYDROCHLORIDE 20 MG: 20 CAPSULE ORAL at 08:08

## 2024-08-25 NOTE — PT/OT/SLP PROGRESS
Physical Therapy      Patient Name:  Jil Graves   MRN:  [id number]    Attempted PT evaluation twice today, pt refused both times stating she did not want to get up and her knees were hurting  . Will follow-up tomorrow.

## 2024-08-25 NOTE — PROGRESS NOTES
[facility] Hospital Medicine Progress Note        Chief Complaint: Inpatient Follow-up for shortness of breadth    HPI:   This is an 84-year-old female medical history of morbid obesity BMI 43, Chronic HFpEF, CKD stage 3b-4, chronic BLE venous stasis and lymphedema with chronic blisters/wounds, venous stents and prior DVT on low-dose Eliquis and Plavix following with Wound Care and report wounds has been improving but over the past week had significant weight gain increasing swelling of both extremities abdominal distention and bloating as well as shortness breath. Denies any fever or chills.     On arrival to ED she was afebrile, hypertensive and saturating 94% on room air. Chest x-ray show cardiomegaly and pulmonary edema. Labs notable for hemoglobin 9.1, creatinine 1.85 likely above baseline around 1.5, potassium 5.5 and elevated troponin 0.054 with a flat trend. EKG on my review showed normal sinus rhythm and old RBBB. BLE venous ultrasound difficult study due to body habitus and edema but no evidence of in the visualized veins.     She was started on IV Lasix, cardiology was consulted and admitted to medicine service for further evaluation and management.    Interval Hx:   Afebrile.  Borderline asymptomatic bradycardia seen.  When seen at bedside she was alert, oriented, comfortably resting.  Responding well with diuresis.  Blood pressure elevated.  No family members at bedside.  She has no new complaints or concerns.  Wound dressed this morning.    Chronic anemia seen with microcytosis, stable platelet.  Hyperkalemia noted today.  BUN and serum creatinine remain elevated with with mild acidosis.  Troponin trended down.    TTE showed EF 65-70% with grade 2 diastolic dysfunction, mild mitral and moderate tricuspid regurgitation, elevated RVSP of 53      Objective/physical exam:  Vitals:    08/25/24 0051 08/25/24 0319 08/25/24 0607 08/25/24 0757   BP: 138/61 (!) 148/79  (!) 160/58    Pulse: (!) 58 (!) 55  (!) 57   Resp: 18 20     Temp: 98.8 °F (37.1 °C) 98.2 °F (36.8 °C)  98.6 °F (37 °C)   TempSrc: Oral Oral  Oral   SpO2: 95% 95%  (!) 93%   Weight:   (!) 147.6 kg (325 lb 6.4 oz)    Height:         General: In no acute distress, afebrile  Respiratory:  Bilateral crepitations, bilateral rhonchi   Cardiovascular: S1, S2, no appreciable murmur, JVD  Abdomen: Soft, nontender, BS +  MSK: Warm, significant pitting lower extremity edema, erythematous , pigmented skin   Neurologic: Alert and oriented x4, limited mobility due to pedal edema  Lab Results   Component Value Date     08/25/2024    K 5.5 (H) 08/25/2024     (H) 08/25/2024    CO2 20 (L) 08/25/2024    BUN 25.8 (H) 08/25/2024    CREATININE 2.00 (H) 08/25/2024    CALCIUM 8.2 (L) 08/25/2024    ANIONGAP 9 01/05/2023    ESTGFRAFRICA 43 01/05/2023    EGFRNONAA 42 08/11/2021      Lab Results   Component Value Date    ALT 8 08/25/2024    AST 12 08/25/2024    ALKPHOS 92 08/25/2024    BILITOT 0.3 08/25/2024      Lab Results   Component Value Date    WBC 7.24 08/25/2024    HGB 8.3 (L) 08/25/2024    HCT 26.6 (L) 08/25/2024    MCV 76.9 (L) 08/25/2024     08/25/2024           Medications:   amLODIPine  10 mg Oral Daily    apixaban  2.5 mg Oral BID    carvediloL  12.5 mg Oral BID    clopidogreL  75 mg Oral Daily    FLUoxetine  20 mg Oral Daily    furosemide (LASIX) injection  40 mg Intravenous Q12H    pantoprazole  40 mg Oral Daily    sodium zirconium cyclosilicate  10 g Oral TID        Current Facility-Administered Medications:     acetaminophen, 1,000 mg, Oral, Q6H PRN    acetaminophen, 650 mg, Oral, Q4H PRN    hydrALAZINE, 20 mg, Intravenous, Q4H PRN    ondansetron, 4 mg, Intravenous, Q4H PRN    prochlorperazine, 5 mg, Intravenous, Q6H PRN    sodium chloride 0.9%, 10 mL, Intravenous, PRN     Assessment/Plan:    Acute on chronic HFpEF with Grade II diastolic dysfunction  Anasarca due to above, volume overload   NSTEMI likely type 2 due to  above -improving  Chronic pulmonary hypertension  Chronic bilateral lower extremity lymphedema, venous stasis  Chronic lower extremity venous statis ulcer-POA  CKD stage 3/4  Hyperkalemia    HX: Obesity, chronic anemia , VHD, history of DVT on Eliquis, PAD s/p PCI, history of DVT, anxiety, depression    Plan:   -TTE reviewed.  Continue IV diuresis today along with Coreg, Plavix.  Cardiology following, appreciate assistance  -hyperkalemia seen.  Add Lokelma.  Repeat labs tomorrow.  Continue telemetry  -switch amlodipine to 90 mg of nifedipine the setting of pedal edema  -continue lower extremity venous stasis ulcers wound care.    -mobilize today with PT  -patient will benefit with home health    Eliquis    Critical care diagnosis:  Acute on chronic HFpEF, anasarca requiring IV diuresis  Critical care time: 50 minutes            Trenton Leonard MD

## 2024-08-25 NOTE — PLAN OF CARE
Problem: Adult Inpatient Plan of Care  Goal: Plan of Care Review  Outcome: Progressing  Goal: Patient-Specific Goal (Individualized)  Outcome: Progressing  Goal: Absence of Hospital-Acquired Illness or Injury  Outcome: Progressing  Goal: Optimal Comfort and Wellbeing  Outcome: Progressing  Goal: Readiness for Transition of Care  Outcome: Progressing     Problem: Bariatric Environmental Safety  Goal: Safety Maintained with Care  Outcome: Progressing     Problem: Acute Kidney Injury/Impairment  Goal: Fluid and Electrolyte Balance  Outcome: Progressing  Goal: Improved Oral Intake  Outcome: Progressing  Goal: Effective Renal Function  Outcome: Progressing     Problem: Pneumonia  Goal: Fluid Balance  Outcome: Progressing  Goal: Resolution of Infection Signs and Symptoms  Outcome: Progressing  Goal: Effective Oxygenation and Ventilation  Outcome: Progressing     Problem: Wound  Goal: Optimal Coping  Outcome: Progressing  Goal: Optimal Functional Ability  Outcome: Progressing  Goal: Absence of Infection Signs and Symptoms  Outcome: Progressing  Goal: Improved Oral Intake  Outcome: Progressing  Goal: Optimal Pain Control and Function  Outcome: Progressing  Goal: Skin Health and Integrity  Outcome: Progressing  Goal: Optimal Wound Healing  Outcome: Progressing     Problem: Fall Injury Risk  Goal: Absence of Fall and Fall-Related Injury  Outcome: Progressing

## 2024-08-25 NOTE — PROGRESS NOTES
" [facility]    Cardiology  Progress Note    Patient Name: Jil Graves  MRN: [id number]  Admission Date: 8/23/2024  Hospital Length of Stay: 2 days  Code Status: Full Code   Attending Provider: Jacqueline Broderick MD   Consulting Provider: ESME Samaniego  Primary Care Physician: Antwon Melendez MD  Principal Problem:<principal problem not specified>    Patient information was obtained from patient, past medical records, and ER records.     Subjective:     Chief Complaint/Reason for Consult: HF    HPI: Ms. Graves is an 85 y/o female who is known to [facility], Dr. Coburn. The patient has chronic BLE Venous Stasis with Non-Healing wounds. About 1 week prior to presentation she developed BLE worsening of swelling to BLE and Abd Distension. She developed SOB and presented to the ER. On arrival to the ER she was found to Pulmonary Vascular Congestion on EKG, BUN/Crea 21.1/1.75, H&H 8.8/27.4, K 5.5, .2, and a Troponin of 0.054. The patient was admitted to Hospital Medicine and [facility] was consulted for HF Management.     8.25.24: NAD. Fluid Balance Net Negative 1140mL. "I am ok." Denies CP, SOB and Palps.     PMH: CKD, Chronic Venous Stasis, Depression, DVT, HTN, Chronic Lymphedema, OA, PAD  PSH: BTL, Cholecystectomy, Angiogram  Family History: Mother, D, Melanoma; Father, D, Unknown; Sister, D, CAD, HTN, HLD  Social History: Denies Illicit Drug, ETOH and Tobacco Use     Previous Cardiac Diagnostics:   ECHO 8.24.24:  Left Ventricle: The left ventricle is normal in size. Increased wall thickness. There is normal systolic function with a visually estimated ejection fraction of 65 - 70%. Grade 2 DD.  Right Ventricle: Normal right ventricular cavity size. Systolic function is normal. TAPSE is 2.08 cm.  Left Atrium: Left atrium is dilated.  Aortic Valve: The aortic valve is a trileaflet valve.  Mitral Valve: There is mild regurgitation.  Tricuspid Valve: There is moderate regurgitation.  Pulmonary Artery: The estimated " pulmonary artery systolic pressure is 53 mmHg.  IVC/SVC: Elevated venous pressure at 15 mmHg.    ECHO 6.14.23:  The study quality is average.   The left ventricle is normal in size. Global left ventricular systolic function is normal. The left ventricular ejection fraction is 55%. The left ventricle diastolic function is impaired (Grade I) with normal left atrial pressure. Mild to moderate concentric left ventricular hypertrophy is present.  The left atrium is mildly enlarged based on BATSHEVA ~38.4ml/m².  Mild calcification of the aortic valve is noted with adequate cuspal excursion.  Moderate (2+) tricuspid and mild to (1+) mitral and pulmonic regurgitation.   PASP ~59 mmHg.  PHTN is noted.    ECHO 12.28.22:  RVSP ~ 90 mmHg.   Left Ventricle: The left ventricular cavity size is normal. Hypertrophy   observed. The left ventricular wall motion is normal. Normal (55-65%)   ejection fraction. Left ventricular diastolic dysfunction.   Right Ventricle: The right ventricular cavity size is mildly dilated.   Left Atrium: Left atrium is mildly dilated. Right Atrium: Cavity is mildly   dilated. IVC/SVC: Elevated central venous pressure (10-20 mm Hg).   Mitral Valve: Mild to moderate mitral regurgitation. No stenosis.   Normal mitral valve structure. There is mild mitral annular calcification.   Tricuspid Valve: Valve structure is normal. No stenosis. Pulmonary   hypertension present. Moderate tricuspid regurgitation.   Pericardium: No pericardial effusion.     Review of patient's allergies indicates:  No Known Allergies  No current facility-administered medications on file prior to encounter.     Current Outpatient Medications on File Prior to Encounter   Medication Sig    amLODIPine (NORVASC) 5 MG tablet Take 5 mg by mouth once daily.    apixaban (ELIQUIS) 2.5 mg Tab Take 1 tablet (2.5 mg total) by mouth 2 (two) times daily.    carvediloL (COREG) 12.5 MG tablet Take 12.5 mg by mouth 2 (two) times daily.    clopidogreL (PLAVIX)  75 mg tablet Take 75 mg by mouth once daily.    FLUoxetine 20 MG capsule Take 20 mg by mouth once daily.    furosemide (LASIX) 40 MG tablet Take 40 mg by mouth 2 (two) times daily.    nitrofurantoin, macrocrystal-monohydrate, (MACROBID) 100 MG capsule Take 1 capsule (100 mg total) by mouth 2 (two) times daily.    pantoprazole (PROTONIX) 40 MG tablet Take 40 mg by mouth once daily at 6am.    valsartan (DIOVAN) 160 MG tablet Take 160 mg by mouth 2 (two) times daily.     Review of Systems   Constitutional:  Positive for fatigue.   Respiratory:  Negative for shortness of breath.    Cardiovascular:  Positive for leg swelling. Negative for chest pain and palpitations.   Skin:  Positive for wound.   All other systems reviewed and are negative.    Objective:     Vital Signs (Most Recent):  Temp: 98.8 °F (37.1 °C) (08/25/24 1147)  Pulse: (!) 59 (08/25/24 1147)  Resp: 18 (08/25/24 1147)  BP: (!) 151/57 (08/25/24 1147)  SpO2: 98 % (08/25/24 1147) Vital Signs (24h Range):  Temp:  [98.2 °F (36.8 °C)-99.5 °F (37.5 °C)] 98.8 °F (37.1 °C)  Pulse:  [55-60] 59  Resp:  [15-20] 18  SpO2:  [93 %-98 %] 98 %  BP: (137-160)/(57-79) 151/57   Weight: (!) 147.6 kg (325 lb 6.4 oz)  Body mass index is 46.69 kg/m².  SpO2: 98 %       Intake/Output Summary (Last 24 hours) at 8/25/2024 1357  Last data filed at 8/25/2024 1320  Gross per 24 hour   Intake 780 ml   Output 2000 ml   Net -1220 ml     Lines/Drains/Airways       Peripheral Intravenous Line  Duration                  Peripheral IV - Single Lumen 08/23/24 1835 20 G Anterior;Left Forearm 1 day                  Significant Labs:   Chemistries:   Recent Labs   Lab 08/23/24  1432 08/23/24  1935 08/24/24  0156 08/24/24  0814 08/25/24  0450     --  142  --  141   K 5.5*  --  5.3*  --  5.5*   *  --  114*  --  113*   CO2 19*  --  18*  --  20*   BUN 21.9*  --  21.1*  --  25.8*   CREATININE 1.85*  --  1.75*  --  2.00*   CALCIUM 9.0  --  8.6  --  8.2*   BILITOT 0.3  --  0.4  --  0.3  "  ALKPHOS 104  --  103  --  92   ALT 8  --  7  --  8   AST 13  --  15  --  12   GLUCOSE 100  --  102  --  95   MG  --   --   --   --  2.10   TROPONINI 0.054* 0.059* 0.068* 0.035  --         CBC/Anemia Labs: Coags:    Recent Labs   Lab 08/23/24  1432 08/24/24  0156 08/25/24  0450   WBC 8.43 7.49 7.24   HGB 9.1* 8.8* 8.3*   HCT 28.2* 27.4* 26.6*    193 201   MCV 75.8* 75.5* 76.9*   RDW 17.8* 17.2* 17.4*    No results for input(s): "PT", "INR", "APTT" in the last 168 hours.     Telemetry: SR    Physical Exam  Constitutional:       General: She is not in acute distress.     Appearance: Normal appearance. She is obese. She is ill-appearing.   HENT:      Mouth/Throat:      Mouth: Mucous membranes are moist.   Eyes:      Extraocular Movements: Extraocular movements intact.      Conjunctiva/sclera: Conjunctivae normal.   Cardiovascular:      Rate and Rhythm: Normal rate and regular rhythm.   Pulmonary:      Effort: Pulmonary effort is normal. No respiratory distress.      Breath sounds: Examination of the right-lower field reveals rhonchi. Examination of the left-lower field reveals rhonchi. Rhonchi present.   Abdominal:      Palpations: Abdomen is soft.   Musculoskeletal:         General: Swelling (BLE Chronic Venous Stasis with Wound; Weeping) present.      Right lower leg: Edema present.      Left lower leg: Edema present.   Skin:     General: Skin is warm.   Neurological:      General: No focal deficit present.      Mental Status: She is alert and oriented to person, place, and time.   Psychiatric:         Behavior: Behavior normal.         Judgment: Judgment normal.       Home Medications:   No current facility-administered medications on file prior to encounter.     Current Outpatient Medications on File Prior to Encounter   Medication Sig Dispense Refill    amLODIPine (NORVASC) 5 MG tablet Take 5 mg by mouth once daily.      apixaban (ELIQUIS) 2.5 mg Tab Take 1 tablet (2.5 mg total) by mouth 2 (two) times " daily. 60 tablet 11    carvediloL (COREG) 12.5 MG tablet Take 12.5 mg by mouth 2 (two) times daily.      clopidogreL (PLAVIX) 75 mg tablet Take 75 mg by mouth once daily.      FLUoxetine 20 MG capsule Take 20 mg by mouth once daily.      furosemide (LASIX) 40 MG tablet Take 40 mg by mouth 2 (two) times daily.      nitrofurantoin, macrocrystal-monohydrate, (MACROBID) 100 MG capsule Take 1 capsule (100 mg total) by mouth 2 (two) times daily. 20 capsule 0    pantoprazole (PROTONIX) 40 MG tablet Take 40 mg by mouth once daily at 6am.      valsartan (DIOVAN) 160 MG tablet Take 160 mg by mouth 2 (two) times daily.       Current Schedule Inpatient Medications:   apixaban  2.5 mg Oral BID    carvediloL  12.5 mg Oral BID    clopidogreL  75 mg Oral Daily    FLUoxetine  20 mg Oral Daily    furosemide (LASIX) injection  40 mg Intravenous Q12H    [START ON 8/26/2024] NIFEdipine  90 mg Oral Daily    pantoprazole  40 mg Oral Daily    sodium zirconium cyclosilicate  10 g Oral TID     Continuous Infusions:    Assessment:   Acute on Chronic Diastolic HF/EF 65-70%    - ECHO (8.24.24) _ LVEF 65-70%, Grade II DD    - ECHO (6.14.24) - LVEF 50-55%, Grade I DD  HTN/HHD with Hx of CKD with Hx of Diastolic HF - Uncontrolled HTN  Anasarca  NSTEMI Type II due to HF and SUSU/CKD III-IV  Pulmonary HTN     - ECHO (6.14.23) - PASP about 59mmHg  Chronic Lymphedema  Chronic BLE Venous Statis Dermatitis with Stasis Ulcers - POA  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  MO  Chronic Anemia  Hx of DVT/Eliquis as OP  GERD  Depression   No Hx of GIB     Plan:   Agree with IV Diuresis   Accurate I&Os and Daily Weights  Consider Nephrology Consult if Renal Indices continue to Climb  Aggressive BP Control  Continue Eliquis (Hx of DVT), Norvasc, BB and Plavix  ECHO Reviewed   No Entresto given Elevated Renal Indices   Consider Wound Care Consult for BLE Wound Management  Will Continue to Follow   Labs in AM: CBC, CMP and Mg    Khoa Kwon, ANP  Cardiology  [facility]  08/25/2024

## 2024-08-26 LAB
ALBUMIN SERPL-MCNC: 2.5 G/DL (ref 3.4–4.8)
ALBUMIN/GLOB SERPL: 0.5 RATIO (ref 1.1–2)
ALP SERPL-CCNC: 91 UNIT/L (ref 40–150)
ALT SERPL-CCNC: 7 UNIT/L (ref 0–55)
ANION GAP SERPL CALC-SCNC: 7 MEQ/L
AST SERPL-CCNC: 12 UNIT/L (ref 5–34)
BASOPHILS # BLD AUTO: 0.05 X10(3)/MCL
BASOPHILS NFR BLD AUTO: 0.7 %
BILIRUB SERPL-MCNC: 0.3 MG/DL
BUN SERPL-MCNC: 26.7 MG/DL (ref 9.8–20.1)
CALCIUM SERPL-MCNC: 8.3 MG/DL (ref 8.4–10.2)
CHLORIDE SERPL-SCNC: 110 MMOL/L (ref 98–107)
CO2 SERPL-SCNC: 23 MMOL/L (ref 23–31)
CREAT SERPL-MCNC: 1.92 MG/DL (ref 0.55–1.02)
CREAT/UREA NIT SERPL: 14
EOSINOPHIL # BLD AUTO: 0.27 X10(3)/MCL (ref 0–0.9)
EOSINOPHIL NFR BLD AUTO: 3.6 %
ERYTHROCYTE [DISTWIDTH] IN BLOOD BY AUTOMATED COUNT: 17.2 % (ref 11.5–17)
FERRITIN SERPL-MCNC: 377.27 NG/ML (ref 4.63–204)
FOLATE SERPL-MCNC: 15 NG/ML (ref 7–31.4)
GFR SERPLBLD CREATININE-BSD FMLA CKD-EPI: 25 ML/MIN/1.73/M2
GLOBULIN SER-MCNC: 5 GM/DL (ref 2.4–3.5)
GLUCOSE SERPL-MCNC: 93 MG/DL (ref 82–115)
HCT VFR BLD AUTO: 26.4 % (ref 37–47)
HGB BLD-MCNC: 8.5 G/DL (ref 12–16)
IMM GRANULOCYTES # BLD AUTO: 0.02 X10(3)/MCL (ref 0–0.04)
IMM GRANULOCYTES NFR BLD AUTO: 0.3 %
IRON SATN MFR SERPL: 25 % (ref 20–50)
IRON SERPL-MCNC: 37 UG/DL (ref 50–170)
LYMPHOCYTES # BLD AUTO: 2.31 X10(3)/MCL (ref 0.6–4.6)
LYMPHOCYTES NFR BLD AUTO: 30.8 %
MAGNESIUM SERPL-MCNC: 1.9 MG/DL (ref 1.6–2.6)
MCH RBC QN AUTO: 24.6 PG (ref 27–31)
MCHC RBC AUTO-ENTMCNC: 32.2 G/DL (ref 33–36)
MCV RBC AUTO: 76.3 FL (ref 80–94)
MONOCYTES # BLD AUTO: 0.81 X10(3)/MCL (ref 0.1–1.3)
MONOCYTES NFR BLD AUTO: 10.8 %
NEUTROPHILS # BLD AUTO: 4.05 X10(3)/MCL (ref 2.1–9.2)
NEUTROPHILS NFR BLD AUTO: 53.8 %
NRBC BLD AUTO-RTO: 0 %
PLATELET # BLD AUTO: 214 X10(3)/MCL (ref 130–400)
PMV BLD AUTO: 10.9 FL (ref 7.4–10.4)
POTASSIUM SERPL-SCNC: 4.4 MMOL/L (ref 3.5–5.1)
PROT SERPL-MCNC: 7.5 GM/DL (ref 5.8–7.6)
RBC # BLD AUTO: 3.46 X10(6)/MCL (ref 4.2–5.4)
SODIUM SERPL-SCNC: 140 MMOL/L (ref 136–145)
TIBC SERPL-MCNC: 114 UG/DL (ref 70–310)
TIBC SERPL-MCNC: 151 UG/DL (ref 250–450)
TRANSFERRIN SERPL-MCNC: 138 MG/DL
VIT B12 SERPL-MCNC: 446 PG/ML (ref 213–816)
WBC # BLD AUTO: 7.51 X10(3)/MCL (ref 4.5–11.5)

## 2024-08-26 PROCEDURE — 83735 ASSAY OF MAGNESIUM: CPT | Performed by: NURSE PRACTITIONER

## 2024-08-26 PROCEDURE — 82728 ASSAY OF FERRITIN: CPT | Performed by: INTERNAL MEDICINE

## 2024-08-26 PROCEDURE — 83550 IRON BINDING TEST: CPT | Performed by: INTERNAL MEDICINE

## 2024-08-26 PROCEDURE — 82607 VITAMIN B-12: CPT | Performed by: INTERNAL MEDICINE

## 2024-08-26 PROCEDURE — 63600175 PHARM REV CODE 636 W HCPCS: Performed by: NURSE PRACTITIONER

## 2024-08-26 PROCEDURE — 85025 COMPLETE CBC W/AUTO DIFF WBC: CPT | Performed by: NURSE PRACTITIONER

## 2024-08-26 PROCEDURE — 82746 ASSAY OF FOLIC ACID SERUM: CPT | Performed by: INTERNAL MEDICINE

## 2024-08-26 PROCEDURE — 25000003 PHARM REV CODE 250: Performed by: INTERNAL MEDICINE

## 2024-08-26 PROCEDURE — 21400001 HC TELEMETRY ROOM

## 2024-08-26 PROCEDURE — 36415 COLL VENOUS BLD VENIPUNCTURE: CPT | Performed by: NURSE PRACTITIONER

## 2024-08-26 PROCEDURE — 80053 COMPREHEN METABOLIC PANEL: CPT | Performed by: NURSE PRACTITIONER

## 2024-08-26 PROCEDURE — 83540 ASSAY OF IRON: CPT | Performed by: INTERNAL MEDICINE

## 2024-08-26 RX ADMIN — APIXABAN 2.5 MG: 2.5 TABLET, FILM COATED ORAL at 08:08

## 2024-08-26 RX ADMIN — FUROSEMIDE 40 MG: 10 INJECTION, SOLUTION INTRAMUSCULAR; INTRAVENOUS at 06:08

## 2024-08-26 RX ADMIN — CARVEDILOL 12.5 MG: 12.5 TABLET, FILM COATED ORAL at 08:08

## 2024-08-26 RX ADMIN — PANTOPRAZOLE SODIUM 40 MG: 40 TABLET, DELAYED RELEASE ORAL at 08:08

## 2024-08-26 RX ADMIN — CARVEDILOL 12.5 MG: 12.5 TABLET, FILM COATED ORAL at 09:08

## 2024-08-26 RX ADMIN — CLOPIDOGREL BISULFATE 75 MG: 75 TABLET ORAL at 08:08

## 2024-08-26 RX ADMIN — APIXABAN 2.5 MG: 2.5 TABLET, FILM COATED ORAL at 09:08

## 2024-08-26 RX ADMIN — FUROSEMIDE 40 MG: 10 INJECTION, SOLUTION INTRAMUSCULAR; INTRAVENOUS at 05:08

## 2024-08-26 RX ADMIN — NIFEDIPINE 90 MG: 90 TABLET, FILM COATED, EXTENDED RELEASE ORAL at 08:08

## 2024-08-26 RX ADMIN — FLUOXETINE HYDROCHLORIDE 20 MG: 20 CAPSULE ORAL at 08:08

## 2024-08-26 NOTE — PROGRESS NOTES
" [facility]    Cardiology  Progress Note    Patient Name: Jil Graves  MRN: [id number]  Admission Date: 8/23/2024  Hospital Length of Stay: 3 days  Code Status: Full Code   Attending Provider: Trenton Leonard MD   Consulting Provider: Yandel Peña MD  Primary Care Physician: Antwon Melendez MD  Principal Problem:<principal problem not specified>    Patient information was obtained from patient, past medical records, and ER records.     Subjective:     Chief Complaint/Reason for Consult: HF    HPI: Ms. Graves is an 85 y/o female who is known to [facility], Dr. Coburn. The patient has chronic BLE Venous Stasis with Non-Healing wounds. About 1 week prior to presentation she developed BLE worsening of swelling to BLE and Abd Distension. She developed SOB and presented to the ER. On arrival to the ER she was found to Pulmonary Vascular Congestion on EKG, BUN/Crea 21.1/1.75, H&H 8.8/27.4, K 5.5, .2, and a Troponin of 0.054. The patient was admitted to Hospital Medicine and [facility] was consulted for HF Management.     8.25.24: NAD. Fluid Balance Net Negative 1140mL. "I am ok." Denies CP, SOB and Palps.     PMH: CKD, Chronic Venous Stasis, Depression, DVT, HTN, Chronic Lymphedema, OA, PAD  PSH: BTL, Cholecystectomy, Angiogram  Family History: Mother, D, Melanoma; Father, D, Unknown; Sister, D, CAD, HTN, HLD  Social History: Denies Illicit Drug, ETOH and Tobacco Use     Previous Cardiac Diagnostics:   ECHO 8.24.24:  Left Ventricle: The left ventricle is normal in size. Increased wall thickness. There is normal systolic function with a visually estimated ejection fraction of 65 - 70%. Grade 2 DD.  Right Ventricle: Normal right ventricular cavity size. Systolic function is normal. TAPSE is 2.08 cm.  Left Atrium: Left atrium is dilated.  Aortic Valve: The aortic valve is a trileaflet valve.  Mitral Valve: There is mild regurgitation.  Tricuspid Valve: There is moderate regurgitation.  Pulmonary Artery: The " estimated pulmonary artery systolic pressure is 53 mmHg.  IVC/SVC: Elevated venous pressure at 15 mmHg.    ECHO 6.14.23:  The study quality is average.   The left ventricle is normal in size. Global left ventricular systolic function is normal. The left ventricular ejection fraction is 55%. The left ventricle diastolic function is impaired (Grade I) with normal left atrial pressure. Mild to moderate concentric left ventricular hypertrophy is present.  The left atrium is mildly enlarged based on BATSHEVA ~38.4ml/m².  Mild calcification of the aortic valve is noted with adequate cuspal excursion.  Moderate (2+) tricuspid and mild to (1+) mitral and pulmonic regurgitation.   PASP ~59 mmHg.  PHTN is noted.    ECHO 12.28.22:  RVSP ~ 90 mmHg.   Left Ventricle: The left ventricular cavity size is normal. Hypertrophy   observed. The left ventricular wall motion is normal. Normal (55-65%)   ejection fraction. Left ventricular diastolic dysfunction.   Right Ventricle: The right ventricular cavity size is mildly dilated.   Left Atrium: Left atrium is mildly dilated. Right Atrium: Cavity is mildly   dilated. IVC/SVC: Elevated central venous pressure (10-20 mm Hg).   Mitral Valve: Mild to moderate mitral regurgitation. No stenosis.   Normal mitral valve structure. There is mild mitral annular calcification.   Tricuspid Valve: Valve structure is normal. No stenosis. Pulmonary   hypertension present. Moderate tricuspid regurgitation.   Pericardium: No pericardial effusion.     Review of patient's allergies indicates:  No Known Allergies  No current facility-administered medications on file prior to encounter.     Current Outpatient Medications on File Prior to Encounter   Medication Sig    amLODIPine (NORVASC) 5 MG tablet Take 5 mg by mouth once daily.    apixaban (ELIQUIS) 2.5 mg Tab Take 1 tablet (2.5 mg total) by mouth 2 (two) times daily.    carvediloL (COREG) 12.5 MG tablet Take 12.5 mg by mouth 2 (two) times daily.     clopidogreL (PLAVIX) 75 mg tablet Take 75 mg by mouth once daily.    FLUoxetine 20 MG capsule Take 20 mg by mouth once daily.    furosemide (LASIX) 40 MG tablet Take 40 mg by mouth 2 (two) times daily.    nitrofurantoin, macrocrystal-monohydrate, (MACROBID) 100 MG capsule Take 1 capsule (100 mg total) by mouth 2 (two) times daily.    pantoprazole (PROTONIX) 40 MG tablet Take 40 mg by mouth once daily at 6am.    valsartan (DIOVAN) 160 MG tablet Take 160 mg by mouth 2 (two) times daily.     Review of Systems   Constitutional:  Negative for fatigue.   Respiratory:  Negative for shortness of breath.    Cardiovascular:  Positive for leg swelling. Negative for chest pain and palpitations.   Skin:  Positive for wound.   All other systems reviewed and are negative.    Objective:     Vital Signs (Most Recent):  Temp: 98.2 °F (36.8 °C) (08/26/24 1049)  Pulse: (!) 56 (08/26/24 1049)  Resp: 18 (08/25/24 1841)  BP: 129/64 (08/26/24 1049)  SpO2: (!) 94 % (08/26/24 1049) Vital Signs (24h Range):  Temp:  [97.7 °F (36.5 °C)-99.3 °F (37.4 °C)] 98.2 °F (36.8 °C)  Pulse:  [56-61] 56  Resp:  [18-19] 18  SpO2:  [94 %-97 %] 94 %  BP: (121-147)/(50-69) 129/64   Weight: (!) 145 kg (319 lb 10.7 oz)  Body mass index is 45.87 kg/m².  SpO2: (!) 94 %       Intake/Output Summary (Last 24 hours) at 8/26/2024 1330  Last data filed at 8/26/2024 0853  Gross per 24 hour   Intake 240 ml   Output 1750 ml   Net -1510 ml     Lines/Drains/Airways       Peripheral Intravenous Line  Duration                  Peripheral IV - Single Lumen 08/25/24 1740 20 G Anterior;Proximal;Right Forearm <1 day                  Significant Labs:   Chemistries:   Recent Labs   Lab 08/23/24  1432 08/23/24  1935 08/24/24  0156 08/24/24  0814 08/25/24  0450 08/26/24  0704     --  142  --  141 140   K 5.5*  --  5.3*  --  5.5* 4.4   *  --  114*  --  113* 110*   CO2 19*  --  18*  --  20* 23   BUN 21.9*  --  21.1*  --  25.8* 26.7*   CREATININE 1.85*  --  1.75*  --  2.00*  "1.92*   CALCIUM 9.0  --  8.6  --  8.2* 8.3*   BILITOT 0.3  --  0.4  --  0.3 0.3   ALKPHOS 104  --  103  --  92 91   ALT 8  --  7  --  8 7   AST 13  --  15  --  12 12   GLUCOSE 100  --  102  --  95 93   MG  --   --   --   --  2.10 1.90   TROPONINI 0.054* 0.059* 0.068* 0.035  --   --         CBC/Anemia Labs: Coags:    Recent Labs   Lab 08/24/24  0156 08/25/24  0450 08/26/24  0704   WBC 7.49 7.24 7.51   HGB 8.8* 8.3* 8.5*   HCT 27.4* 26.6* 26.4*    201 214   MCV 75.5* 76.9* 76.3*   RDW 17.2* 17.4* 17.2*   IRON  --   --  37*   FERRITIN  --   --  377.27*   FOLATE  --   --  15.0   [id number]  --   --  446    No results for input(s): "PT", "INR", "APTT" in the last 168 hours.     Telemetry: SR    Physical Exam  Constitutional:       General: She is not in acute distress.     Appearance: Normal appearance. She is obese. She is ill-appearing.   HENT:      Mouth/Throat:      Mouth: Mucous membranes are moist.   Eyes:      Extraocular Movements: Extraocular movements intact.      Conjunctiva/sclera: Conjunctivae normal.   Cardiovascular:      Rate and Rhythm: Normal rate and regular rhythm.   Pulmonary:      Effort: Pulmonary effort is normal. No respiratory distress.      Breath sounds: Examination of the right-lower field reveals rhonchi. Examination of the left-lower field reveals rhonchi. Rhonchi present.   Abdominal:      Palpations: Abdomen is soft.   Musculoskeletal:         General: Swelling (BLE Chronic Venous Stasis with Wound; Weeping) present.      Right lower leg: Edema present.      Left lower leg: Edema present.   Skin:     General: Skin is warm.   Neurological:      General: No focal deficit present.      Mental Status: She is alert and oriented to person, place, and time.   Psychiatric:         Behavior: Behavior normal.         Judgment: Judgment normal.       Home Medications:   No current facility-administered medications on file prior to encounter.     Current Outpatient Medications on File Prior to " Encounter   Medication Sig Dispense Refill    amLODIPine (NORVASC) 5 MG tablet Take 5 mg by mouth once daily.      apixaban (ELIQUIS) 2.5 mg Tab Take 1 tablet (2.5 mg total) by mouth 2 (two) times daily. 60 tablet 11    carvediloL (COREG) 12.5 MG tablet Take 12.5 mg by mouth 2 (two) times daily.      clopidogreL (PLAVIX) 75 mg tablet Take 75 mg by mouth once daily.      FLUoxetine 20 MG capsule Take 20 mg by mouth once daily.      furosemide (LASIX) 40 MG tablet Take 40 mg by mouth 2 (two) times daily.      nitrofurantoin, macrocrystal-monohydrate, (MACROBID) 100 MG capsule Take 1 capsule (100 mg total) by mouth 2 (two) times daily. 20 capsule 0    pantoprazole (PROTONIX) 40 MG tablet Take 40 mg by mouth once daily at 6am.      valsartan (DIOVAN) 160 MG tablet Take 160 mg by mouth 2 (two) times daily.       Current Schedule Inpatient Medications:   apixaban  2.5 mg Oral BID    carvediloL  12.5 mg Oral BID    clopidogreL  75 mg Oral Daily    FLUoxetine  20 mg Oral Daily    furosemide (LASIX) injection  40 mg Intravenous Q12H    NIFEdipine  90 mg Oral Daily    pantoprazole  40 mg Oral Daily     Continuous Infusions:    Assessment:   Acute on Chronic Diastolic HF/EF 65-70%    - ECHO (8.24.24) _ LVEF 65-70%, Grade II DD    - ECHO (6.14.24) - LVEF 50-55%, Grade I DD  HTN/HHD with Hx of CKD with Hx of Diastolic HF - Uncontrolled HTN  Anasarca  NSTEMI Type II due to HF and SUSU/CKD III-IV  Pulmonary HTN     - ECHO (6.14.23) - PASP about 59mmHg  Chronic Lymphedema  Chronic BLE Venous Statis Dermatitis with Stasis Ulcers - POA  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  MO  Chronic Anemia  Hx of DVT/Eliquis as OP  GERD  Depression   No Hx of GIB     Plan:   Agree with IV Diuresis   Accurate I&Os and Daily Weights  Consider Nephrology Consult if Renal Indices continue to Climb  Aggressive BP Control  Continue Eliquis (Hx of DVT), Norvasc, BB and Plavix  ECHO Reviewed   No Entresto given Elevated Renal Indices   Consider Wound Care Consult for  BLE Wound Management  Will Continue to Follow   Labs in AM: CBC, CMP and Mg    Yandel Peña MD  Cardiology  [facility]  08/26/2024

## 2024-08-26 NOTE — PT/OT/SLP PROGRESS
Occupational Therapy      Patient Name:  Jil Graves   MRN:  [id number]    Patient not seen today secondary to refusing to participate despite max encouragement and education on purpose of OT/PT consults. Patient stating that her knees hurt too much to participate but agrees to participate tomorrow. Nurse aware of refusal. Will follow-up when schedule allows.    8/26/2024

## 2024-08-26 NOTE — PT/OT/SLP PROGRESS
Physical Therapy      Patient Name:  Jil Graves   MRN:  [id number]    Patient not seen today secondary to pt refusing to participate with therapy despite maximal encouragement and education on purpose of OT/PT consults. Pt reported that her knees are hurting too bad to mobilize. Pt agreed to participate tomorrow. Will follow-up tomorrow or as schedule allows.    8/26/2024

## 2024-08-26 NOTE — PROGRESS NOTES
Inpatient Nutrition Assessment    Admit Date: 8/23/2024   Total duration of encounter: 3 days   Patient Age: 84 y.o.    Nutrition Recommendation/Prescription     Continue current diet (Diet Heart Healthy) as tolerated.  Add Boost Plus (provides 360 kcal, 14 g protein per serving) TID  Add Daryl (provides 90 kcal, 2.5 g protein per serving) BID  Monitor PO intakes, labs, and wt    Communication of Recommendations: reviewed with patient    Nutrition Assessment     Malnutrition Assessment/Nutrition-Focused Physical Exam    Malnutrition Context: other (see comments) (does not meet criteria) (08/26/24 1336)     Energy Intake (Malnutrition): other (see comments) (does not meet criteria) (08/26/24 1336)  Weight Loss (Malnutrition): other (see comments) (does not meet criteria) (08/26/24 1336)  Subcutaneous Fat (Malnutrition): other (see comments) (does not meet criteria) (08/26/24 1336)           Muscle Mass (Malnutrition): other (see comments) (does not meet criteria) (08/26/24 1336)                          Fluid Accumulation (Malnutrition):  (does not meet criteria) (08/26/24 1336)  Hand  Strength, Left (Malnutrition):  (unable to assess) (08/26/24 1336)  Hand  Strength, Right (Malnutrition):  (unable to assess) (08/26/24 1336)  A minimum of two characteristics is recommended for diagnosis of either severe or non-severe malnutrition.    Chart Review    Reason Seen: continuous nutrition monitoring for CKD and HF    Malnutrition Screening Tool Results   Have you recently lost weight without trying?: No  Have you been eating poorly because of a decreased appetite?: No   MST Score: 0   Diagnosis:  Acute on Chronic HFpEF/anasarca  NSTEMI type 2 in the setting of decompensated heart failure  Chronic BLE lymphedema and venous stasis ulcers  CKD stage 4-at baseline  Chronic anemia that is stable    Relevant Medical History:   Chronic HFpEF, CKD stage 3b-4, chronic BLE venous stasis and lymphedema with chronic  "blisters/wounds, venous stents and prior DVT     Scheduled Medications:  apixaban, 2.5 mg, BID  carvediloL, 12.5 mg, BID  clopidogreL, 75 mg, Daily  FLUoxetine, 20 mg, Daily  furosemide (LASIX) injection, 40 mg, Q12H  NIFEdipine, 90 mg, Daily  pantoprazole, 40 mg, Daily    Continuous Infusions:   PRN Medications:  acetaminophen, 1,000 mg, Q6H PRN  acetaminophen, 650 mg, Q4H PRN  hydrALAZINE, 20 mg, Q4H PRN  ondansetron, 4 mg, Q4H PRN  prochlorperazine, 5 mg, Q6H PRN  sodium chloride 0.9%, 10 mL, PRN    Calorie Containing IV Medications: no significant kcals from medications at this time    Recent Labs   Lab 08/23/24  1432 08/24/24  0156 08/25/24  0450 08/26/24  0704    142 141 140   K 5.5* 5.3* 5.5* 4.4   CALCIUM 9.0 8.6 8.2* 8.3*   MG  --   --  2.10 1.90   * 114* 113* 110*   CO2 19* 18* 20* 23   BUN 21.9* 21.1* 25.8* 26.7*   CREATININE 1.85* 1.75* 2.00* 1.92*   EGFRNORACEVR 26 28 24 25   GLUCOSE 100 102 95 93   BILITOT 0.3 0.4 0.3 0.3   ALKPHOS 104 103 92 91   ALT 8 7 8 7   AST 13 15 12 12   ALBUMIN 2.8* 2.7* 2.6* 2.5*   WBC 8.43 7.49 7.24 7.51   HGB 9.1* 8.8* 8.3* 8.5*   HCT 28.2* 27.4* 26.6* 26.4*     Nutrition Orders:  Diet Heart Healthy      Appetite/Oral Intake: fair/25-50% of meals  Factors Affecting Nutritional Intake: decreased appetite  Social Needs Impacting Access to Food: none identified  Food/Adventist/Cultural Preferences: none reported  Food Allergies: no known food allergies  Last Bowel Movement: 08/25/24  Wound(s):     Wound 08/23/24 1605 Venous Ulcer Left distal;anterior Leg #1-Tissue loss description: Full thickness       Wound 08/23/24 1605 Venous Ulcer Right distal;inferior Leg #2-Tissue loss description: Partial thickness     Comments    8/26/24: Pt with fair appetite since admit but states it is normally very good. Pt denies any unintended wt loss. Will add Boost Plus and Daryl to aid in meeting energy needs and to promote wound healing.     Anthropometrics    Height: 5' 10" " (177.8 cm), Height Method: Stated  Last Weight: (!) 145 kg (319 lb 10.7 oz) (08/26/24 0500), Weight Method: Bed Scale  BMI (Calculated): 45.9  BMI Classification: obese grade III (BMI >/=40)     Ideal Body Weight (IBW), Female: 150 lb     % Ideal Body Weight, Female (lb): 200 %                             Usual Weight Provided By: patient denies unintentional weight loss    Wt Readings from Last 5 Encounters:   08/26/24 (!) 145 kg (319 lb 10.7 oz)   08/08/24 136.1 kg (300 lb)   07/31/24 (!) 136.9 kg (301 lb 13 oz)   11/02/22 120.2 kg (265 lb)   10/26/22 120.2 kg (265 lb)     Weight Change(s) Since Admission:   Wt Readings from Last 1 Encounters:   08/26/24 0500 (!) 145 kg (319 lb 10.7 oz)   08/25/24 0607 (!) 147.6 kg (325 lb 6.4 oz)   08/23/24 1241 136.1 kg (300 lb)   Admit Weight: 136.1 kg (300 lb) (08/23/24 1241), Weight Method: Stated    Estimated Needs    Weight Used For Calorie Calculations: (!) 145 kg (319 lb 10.7 oz)  Energy Calorie Requirements (kcal): 1975 kcals (1 SFxMSJ)  Energy Need Method: [place]-St Jeor  Weight Used For Protein Calculations: (!) 145 kg (319 lb 10.7 oz)  Protein Requirements:  g/kg (0.6-0.8 g/kg CBW for CKD)  Fluid Requirements (mL): 1975 mL (1 mL/kcal)        Enteral Nutrition     Patient not receiving enteral nutrition at this time.    Parenteral Nutrition     Patient not receiving parenteral nutrition support at this time.    Evaluation of Received Nutrient Intake    Calories: not meeting estimated needs  Protein: not meeting estimated needs    Patient Education     Not applicable.    Nutrition Diagnosis     PES: Inadequate oral intake related to acute illness as evidenced by meeting <75% EEN since admit. (new)       Nutrition Interventions     Intervention(s): modified composition of meals/snacks, commercial beverage, and collaboration with other providers    Goal: Meet greater than 80% of nutritional needs by follow-up. (new)  Goal: Consume % of oral supplements by  follow-up. (new)    Nutrition Goals & Monitoring     Dietitian will monitor: energy intake, weight, and electrolyte/renal panel  Discharge planning: continue Heart Healthy diet  Nutrition Risk/Follow-Up: moderate (follow-up in 3-5 days)   Please consult if re-assessment needed sooner.

## 2024-08-26 NOTE — PLAN OF CARE
08/26/24 0904   Discharge Assessment   Assessment Type Discharge Planning Assessment   Confirmed/corrected address, phone number and insurance Yes   Confirmed Demographics Correct on Facesheet   Source of Information patient   When was your last doctors appointment? 07/22/24   Does patient/caregiver understand observation status Yes   Communicated NORMA with patient/caregiver Yes   Reason For Admission chf, chest pain   People in Home child(pablo), adult  (lives with adult daughter)   Facility Arrived From: home   Do you expect to return to your current living situation? Yes   Do you have help at home or someone to help you manage your care at home? Yes   Who are your caregiver(s) and their phone number(s)? [employer]   Prior to hospitilization cognitive status: Alert/Oriented   Current cognitive status: Alert/Oriented   Walking or Climbing Stairs Difficulty yes   Walking or Climbing Stairs ambulation difficulty, requires equipment   Mobility Management wc. walker   Dressing/Bathing Difficulty no   Home Accessibility wheelchair accessible   Equipment Currently Used at Home walker, rolling;wheelchair  (reports bathroom has rails)   Do you currently have service(s) that help you manage your care at home? Yes   Name and Contact number of agency [facility]   Is the pt/caregiver preference to resume services with current agency Yes   Do you take prescription medications? Yes   Do you have any problems affording any of your prescribed medications? No   Is the patient taking medications as prescribed? yes   Who is going to help you get home at discharge? [facility]   How do you get to doctors appointments? family or friend will provide   Are you on dialysis? No   Do you take coumadin? No   Discharge Plan A Home Health   Discharge Plan B Home Health   DME Needed Upon Discharge  none   Discharge Plan discussed with: Patient   Transition of Care Barriers None   Housing Stability   In the last 12 months, was there a time when you were not able  to pay the mortgage or rent on time? N   At any time in the past 12 months, were you homeless or living in a shelter (including now)? N   Transportation Needs   Has the lack of transportation kept you from medical appointments, meetings, work or from getting things needed for daily living? No   Food Insecurity   Within the past 12 months, you worried that your food would run out before you got the money to buy more. Never true   Within the past 12 months, the food you bought just didn't last and you didn't have money to get more. Never true   Utilities   In the past 12 months has the electric, gas, oil, or water company threatened to shut off services in your home? No     Pt lives with daughter. She has [facility] , does not drive, has walker and wc. Other needs TBD.

## 2024-08-26 NOTE — NURSING
Nurses Note -- 4 Eyes      8/26/2024   10:06 AM      Skin assessed during: Q Shift Change      [] No Altered Skin Integrity Present    [x]Prevention Measures Documented      [x] Yes- Altered Skin Integrity Present or Discovered   [] LDA Added if Not in Epic (Describe Wound)   [] New Altered Skin Integrity was Present on Admit and Documented in LDA   [] Wound Image Taken    Wound Care Consulted? No    Attending Nurse:  Jayden GALVEZ    Second RN/Staff Member:  Betito GALVEZ

## 2024-08-26 NOTE — PROGRESS NOTES
[facility] Hospital Medicine Progress Note        Chief Complaint: Inpatient Follow-up    HPI:   84-year-old female with significant history of morbid obesity, chronic diastolic heart failure, chronic kidney disease, chronic bilateral lower extremity lymphedema/venous stasis, chronic venous stasis wounds, status post venous stents, prior DVT on low-dose Eliquis, depression, PVD presented to the ED with complaints of progressively worsening bilateral lower extremity swelling, abdominal distention and bloating with associated dyspnea.  Hypertensive in the ED, chest x-ray consistent with pulmonary edema.  Lab significant for anemia, renal insufficiency with creatinine above baseline, hyperkalemia, elevated troponins with flat trend.  Patient was admitted hospital medicine services for acute decompensated diastolic heart failure/anasarca and NSTEMI type 2, cardiology services consulted.  Initiated IV diuretics.  Echocardiogram ordered.  Echo with intact ejection fraction, grade 2 diastolic dysfunction.  Cardiology recommended to continue IV diuresis, home medications continued as appropriate.  Interval Hx:     Patient seen at bedside, comfortably laying in bed, lower extremity swelling persist, respiratory status stable with saturation in low 90s, no new complaints, no acute events overnight  Objective/physical exam:  General: In no acute distress, afebrile  Chest: Clear to auscultation bilaterally  Heart: S1, S2, no appreciable murmur  Abdomen: Soft, nontender, BS +  MSK:  Bilateral lower extremity edema, chronic venous stasis   Neurologic: Alert and oriented x4,   VITAL SIGNS: 24 HRS MIN & MAX LAST   Temp  Min: 97.7 °F (36.5 °C)  Max: 99.3 °F (37.4 °C) 98.5 °F (36.9 °C)   BP  Min: 121/55  Max: 151/57 (!) 147/69   Pulse  Min: 57  Max: 61  (!) 58   Resp  Min: 18  Max: 19 18   SpO2  Min: 94 %  Max: 98 % (!) 94 %       Recent Labs   Lab 08/26/24  0704   WBC 7.51   RBC 3.46*   HGB 8.5*   HCT  26.4*   MCV 76.3*   MCH 24.6*   MCHC 32.2*   RDW 17.2*      MPV 10.9*         Recent Labs   Lab 08/26/24  0704      K 4.4   *   CO2 23   BUN 26.7*   CREATININE 1.92*   CALCIUM 8.3*   MG 1.90   ALBUMIN 2.5*   ALKPHOS 91   ALT 7   AST 12   BILITOT 0.3          Microbiology Results (last 7 days)       ** No results found for the last 168 hours. **             Scheduled Med:   apixaban  2.5 mg Oral BID    carvediloL  12.5 mg Oral BID    clopidogreL  75 mg Oral Daily    FLUoxetine  20 mg Oral Daily    furosemide (LASIX) injection  40 mg Intravenous Q12H    NIFEdipine  90 mg Oral Daily    pantoprazole  40 mg Oral Daily          Assessment/Plan:    Acute decompensated diastolic heart failure   Volume overload/anasarca   Acute on chronic kidney disease-cardiorenal  Anemia of chronic disease   NSTEMI-likely type 2 secondary to decompensated heart failure  Poorly-controlled HTN-improved  Bilateral lower extremity wounds/chronic venous stasis wounds   Chronic lymphedema   Morbid obesity   History of DVT on Eliquis   History of depression   History of PVD   Prophylaxis    Will continue IV diuretics with Lasix 40 mg b.i.d.  Lower extremity swelling still persist, renal function somewhat stabilizing on diuresis   In case edema persist might have to consider increasing Lasix dose or switching to Bumex   In case of worsening renal function consider Nephrology evaluation   Cardiology on board and is agreeing with IV diuresis   Echo revealed in detail EF, has diastolic dysfunction  Continue Eliquis, Coreg, Plavix, nifedipine  BP improved   Continue other home meds-Prozac, ppi   Wound care consult for chronic venous stasis wounds  DVT prophylaxis-on Eliquis      Felicia Vergara MD   08/26/2024

## 2024-08-27 LAB
ALBUMIN SERPL-MCNC: 2.5 G/DL (ref 3.4–4.8)
ALBUMIN/GLOB SERPL: 0.6 RATIO (ref 1.1–2)
ALP SERPL-CCNC: 88 UNIT/L (ref 40–150)
ALT SERPL-CCNC: 9 UNIT/L (ref 0–55)
ANION GAP SERPL CALC-SCNC: 8 MEQ/L
AST SERPL-CCNC: 12 UNIT/L (ref 5–34)
BASOPHILS # BLD AUTO: 0.04 X10(3)/MCL
BASOPHILS NFR BLD AUTO: 0.5 %
BILIRUB SERPL-MCNC: 0.2 MG/DL
BUN SERPL-MCNC: 27.9 MG/DL (ref 9.8–20.1)
CALCIUM SERPL-MCNC: 8 MG/DL (ref 8.4–10.2)
CHLORIDE SERPL-SCNC: 110 MMOL/L (ref 98–107)
CO2 SERPL-SCNC: 23 MMOL/L (ref 23–31)
CREAT SERPL-MCNC: 1.9 MG/DL (ref 0.55–1.02)
CREAT/UREA NIT SERPL: 15
EOSINOPHIL # BLD AUTO: 0.28 X10(3)/MCL (ref 0–0.9)
EOSINOPHIL NFR BLD AUTO: 3.7 %
ERYTHROCYTE [DISTWIDTH] IN BLOOD BY AUTOMATED COUNT: 17.1 % (ref 11.5–17)
GFR SERPLBLD CREATININE-BSD FMLA CKD-EPI: 26 ML/MIN/1.73/M2
GLOBULIN SER-MCNC: 4.5 GM/DL (ref 2.4–3.5)
GLUCOSE SERPL-MCNC: 90 MG/DL (ref 82–115)
HCT VFR BLD AUTO: 26.2 % (ref 37–47)
HGB BLD-MCNC: 8.4 G/DL (ref 12–16)
IMM GRANULOCYTES # BLD AUTO: 0.03 X10(3)/MCL (ref 0–0.04)
IMM GRANULOCYTES NFR BLD AUTO: 0.4 %
LYMPHOCYTES # BLD AUTO: 2.39 X10(3)/MCL (ref 0.6–4.6)
LYMPHOCYTES NFR BLD AUTO: 31.7 %
MCH RBC QN AUTO: 24.4 PG (ref 27–31)
MCHC RBC AUTO-ENTMCNC: 32.1 G/DL (ref 33–36)
MCV RBC AUTO: 76.2 FL (ref 80–94)
MONOCYTES # BLD AUTO: 0.86 X10(3)/MCL (ref 0.1–1.3)
MONOCYTES NFR BLD AUTO: 11.4 %
NEUTROPHILS # BLD AUTO: 3.94 X10(3)/MCL (ref 2.1–9.2)
NEUTROPHILS NFR BLD AUTO: 52.3 %
NRBC BLD AUTO-RTO: 0 %
PLATELET # BLD AUTO: 197 X10(3)/MCL (ref 130–400)
PMV BLD AUTO: 10.5 FL (ref 7.4–10.4)
POTASSIUM SERPL-SCNC: 4.6 MMOL/L (ref 3.5–5.1)
PROT SERPL-MCNC: 7 GM/DL (ref 5.8–7.6)
RBC # BLD AUTO: 3.44 X10(6)/MCL (ref 4.2–5.4)
SODIUM SERPL-SCNC: 141 MMOL/L (ref 136–145)
WBC # BLD AUTO: 7.54 X10(3)/MCL (ref 4.5–11.5)

## 2024-08-27 PROCEDURE — 25000003 PHARM REV CODE 250: Performed by: NURSE PRACTITIONER

## 2024-08-27 PROCEDURE — 63600175 PHARM REV CODE 636 W HCPCS: Performed by: NURSE PRACTITIONER

## 2024-08-27 PROCEDURE — 25000003 PHARM REV CODE 250: Performed by: INTERNAL MEDICINE

## 2024-08-27 PROCEDURE — 80053 COMPREHEN METABOLIC PANEL: CPT | Performed by: INTERNAL MEDICINE

## 2024-08-27 PROCEDURE — 97166 OT EVAL MOD COMPLEX 45 MIN: CPT

## 2024-08-27 PROCEDURE — 85025 COMPLETE CBC W/AUTO DIFF WBC: CPT | Performed by: INTERNAL MEDICINE

## 2024-08-27 PROCEDURE — 21400001 HC TELEMETRY ROOM

## 2024-08-27 PROCEDURE — 97162 PT EVAL MOD COMPLEX 30 MIN: CPT

## 2024-08-27 PROCEDURE — 36415 COLL VENOUS BLD VENIPUNCTURE: CPT | Performed by: INTERNAL MEDICINE

## 2024-08-27 RX ORDER — NIFEDIPINE 60 MG/1
60 TABLET, EXTENDED RELEASE ORAL DAILY
Status: DISCONTINUED | OUTPATIENT
Start: 2024-08-27 | End: 2024-08-28 | Stop reason: HOSPADM

## 2024-08-27 RX ADMIN — CLOPIDOGREL BISULFATE 75 MG: 75 TABLET ORAL at 08:08

## 2024-08-27 RX ADMIN — CARVEDILOL 12.5 MG: 12.5 TABLET, FILM COATED ORAL at 08:08

## 2024-08-27 RX ADMIN — APIXABAN 2.5 MG: 2.5 TABLET, FILM COATED ORAL at 08:08

## 2024-08-27 RX ADMIN — FUROSEMIDE 40 MG: 10 INJECTION, SOLUTION INTRAMUSCULAR; INTRAVENOUS at 10:08

## 2024-08-27 RX ADMIN — FLUOXETINE HYDROCHLORIDE 20 MG: 20 CAPSULE ORAL at 08:08

## 2024-08-27 RX ADMIN — PANTOPRAZOLE SODIUM 40 MG: 40 TABLET, DELAYED RELEASE ORAL at 08:08

## 2024-08-27 RX ADMIN — FUROSEMIDE 40 MG: 10 INJECTION, SOLUTION INTRAMUSCULAR; INTRAVENOUS at 06:08

## 2024-08-27 RX ADMIN — NIFEDIPINE 60 MG: 60 TABLET, FILM COATED, EXTENDED RELEASE ORAL at 08:08

## 2024-08-27 RX ADMIN — ACETAMINOPHEN 1000 MG: 500 TABLET ORAL at 08:08

## 2024-08-27 NOTE — NURSING
Nurses Note -- 4 Eyes      8/27/2024   7:30 AM      Skin assessed during: Q Shift Change      [] No Altered Skin Integrity Present    []Prevention Measures Documented      [x] Yes- Altered Skin Integrity Present or Discovered   [] LDA Added if Not in Epic (Describe Wound)   [] New Altered Skin Integrity was Present on Admit and Documented in LDA   [] Wound Image Taken    Wound Care Consulted? No    Attending Nurse:  Rosalba Hernández RN/Staff Member:  Pema

## 2024-08-27 NOTE — PROGRESS NOTES
" [facility]    Cardiology  Progress Note    Patient Name: Jil Graves  MRN: [id number]  Admission Date: 8/23/2024  Hospital Length of Stay: 4 days  Code Status: Full Code   Attending Provider: Trenton Leonard MD   Consulting Provider: Yandel Peña MD  Primary Care Physician: Antwon Melendez MD  Principal Problem:<principal problem not specified>    Patient information was obtained from patient, past medical records, and ER records.     Subjective:     Chief Complaint/Reason for Consult: HF    HPI: Ms. Graves is an 83 y/o female who is known to [facility], Dr. Coburn. The patient has chronic BLE Venous Stasis with Non-Healing wounds. About 1 week prior to presentation she developed BLE worsening of swelling to BLE and Abd Distension. She developed SOB and presented to the ER. On arrival to the ER she was found to Pulmonary Vascular Congestion on EKG, BUN/Crea 21.1/1.75, H&H 8.8/27.4, K 5.5, .2, and a Troponin of 0.054. The patient was admitted to Hospital Medicine and [facility] was consulted for HF Management.     8.25.24: NAD. Fluid Balance Net Negative 1140mL. "I am ok." Denies CP, SOB and Palps.     PMH: CKD, Chronic Venous Stasis, Depression, DVT, HTN, Chronic Lymphedema, OA, PAD  PSH: BTL, Cholecystectomy, Angiogram  Family History: Mother, D, Melanoma; Father, D, Unknown; Sister, D, CAD, HTN, HLD  Social History: Denies Illicit Drug, ETOH and Tobacco Use     Previous Cardiac Diagnostics:   ECHO 8.24.24:  Left Ventricle: The left ventricle is normal in size. Increased wall thickness. There is normal systolic function with a visually estimated ejection fraction of 65 - 70%. Grade 2 DD.  Right Ventricle: Normal right ventricular cavity size. Systolic function is normal. TAPSE is 2.08 cm.  Left Atrium: Left atrium is dilated.  Aortic Valve: The aortic valve is a trileaflet valve.  Mitral Valve: There is mild regurgitation.  Tricuspid Valve: There is moderate regurgitation.  Pulmonary Artery: The " estimated pulmonary artery systolic pressure is 53 mmHg.  IVC/SVC: Elevated venous pressure at 15 mmHg.    ECHO 6.14.23:  The study quality is average.   The left ventricle is normal in size. Global left ventricular systolic function is normal. The left ventricular ejection fraction is 55%. The left ventricle diastolic function is impaired (Grade I) with normal left atrial pressure. Mild to moderate concentric left ventricular hypertrophy is present.  The left atrium is mildly enlarged based on BATSHEVA ~38.4ml/m².  Mild calcification of the aortic valve is noted with adequate cuspal excursion.  Moderate (2+) tricuspid and mild to (1+) mitral and pulmonic regurgitation.   PASP ~59 mmHg.  PHTN is noted.    ECHO 12.28.22:  RVSP ~ 90 mmHg.   Left Ventricle: The left ventricular cavity size is normal. Hypertrophy   observed. The left ventricular wall motion is normal. Normal (55-65%)   ejection fraction. Left ventricular diastolic dysfunction.   Right Ventricle: The right ventricular cavity size is mildly dilated.   Left Atrium: Left atrium is mildly dilated. Right Atrium: Cavity is mildly   dilated. IVC/SVC: Elevated central venous pressure (10-20 mm Hg).   Mitral Valve: Mild to moderate mitral regurgitation. No stenosis.   Normal mitral valve structure. There is mild mitral annular calcification.   Tricuspid Valve: Valve structure is normal. No stenosis. Pulmonary   hypertension present. Moderate tricuspid regurgitation.   Pericardium: No pericardial effusion.     Review of patient's allergies indicates:  No Known Allergies  No current facility-administered medications on file prior to encounter.     Current Outpatient Medications on File Prior to Encounter   Medication Sig    amLODIPine (NORVASC) 5 MG tablet Take 5 mg by mouth once daily.    apixaban (ELIQUIS) 2.5 mg Tab Take 1 tablet (2.5 mg total) by mouth 2 (two) times daily.    carvediloL (COREG) 12.5 MG tablet Take 12.5 mg by mouth 2 (two) times daily.     clopidogreL (PLAVIX) 75 mg tablet Take 75 mg by mouth once daily.    FLUoxetine 20 MG capsule Take 20 mg by mouth once daily.    furosemide (LASIX) 40 MG tablet Take 40 mg by mouth 2 (two) times daily.    nitrofurantoin, macrocrystal-monohydrate, (MACROBID) 100 MG capsule Take 1 capsule (100 mg total) by mouth 2 (two) times daily.    pantoprazole (PROTONIX) 40 MG tablet Take 40 mg by mouth once daily at 6am.    valsartan (DIOVAN) 160 MG tablet Take 160 mg by mouth 2 (two) times daily.     Review of Systems   Constitutional:  Negative for fatigue.   Respiratory:  Positive for wheezing. Negative for shortness of breath.    Cardiovascular:  Positive for leg swelling. Negative for chest pain and palpitations.   Skin:  Positive for wound.   All other systems reviewed and are negative.    Objective:     Vital Signs (Most Recent):  Temp: 98.3 °F (36.8 °C) (08/27/24 0719)  Pulse: 65 (08/27/24 0719)  Resp: 20 (08/27/24 0348)  BP: (!) 131/53 (08/27/24 0719)  SpO2: 96 % (08/27/24 0719) Vital Signs (24h Range):  Temp:  [97.8 °F (36.6 °C)-99.4 °F (37.4 °C)] 98.3 °F (36.8 °C)  Pulse:  [56-65] 65  Resp:  [18-20] 20  SpO2:  [94 %-96 %] 96 %  BP: (112-147)/(52-69) 131/53   Weight: (!) 145 kg (319 lb 10.7 oz)  Body mass index is 45.87 kg/m².  SpO2: 96 %       Intake/Output Summary (Last 24 hours) at 8/27/2024 0816  Last data filed at 8/27/2024 0616  Gross per 24 hour   Intake 1240 ml   Output 2151 ml   Net -911 ml     Lines/Drains/Airways       Peripheral Intravenous Line  Duration                  Peripheral IV - Single Lumen 08/25/24 1740 20 G Anterior;Proximal;Right Forearm 1 day                  Significant Labs:   Chemistries:   Recent Labs   Lab 08/23/24  1432 08/23/24  1935 08/24/24  0156 08/24/24  0814 08/25/24  0450 08/26/24  0704 08/27/24  0344     --  142  --  141 140 141   K 5.5*  --  5.3*  --  5.5* 4.4 4.6   *  --  114*  --  113* 110* 110*   CO2 19*  --  18*  --  20* 23 23   BUN 21.9*  --  21.1*  --  25.8*  "26.7* 27.9*   CREATININE 1.85*  --  1.75*  --  2.00* 1.92* 1.90*   CALCIUM 9.0  --  8.6  --  8.2* 8.3* 8.0*   BILITOT 0.3  --  0.4  --  0.3 0.3 0.2   ALKPHOS 104  --  103  --  92 91 88   ALT 8  --  7  --  8 7 9   AST 13  --  15  --  12 12 12   GLUCOSE 100  --  102  --  95 93 90   MG  --   --   --   --  2.10 1.90  --    TROPONINI 0.054* 0.059* 0.068* 0.035  --   --   --         CBC/Anemia Labs: Coags:    Recent Labs   Lab 08/25/24  0450 08/26/24  0704 08/27/24  0344   WBC 7.24 7.51 7.54   HGB 8.3* 8.5* 8.4*   HCT 26.6* 26.4* 26.2*    214 197   MCV 76.9* 76.3* 76.2*   RDW 17.4* 17.2* 17.1*   IRON  --  37*  --    FERRITIN  --  377.27*  --    FOLATE  --  15.0  --    [id number]  --  446  --     No results for input(s): "PT", "INR", "APTT" in the last 168 hours.     Telemetry: SR    Physical Exam  Constitutional:       Appearance: Normal appearance. She is obese. She is not ill-appearing.   HENT:      Mouth/Throat:      Mouth: Mucous membranes are moist.   Eyes:      Extraocular Movements: Extraocular movements intact.      Conjunctiva/sclera: Conjunctivae normal.   Cardiovascular:      Rate and Rhythm: Normal rate and regular rhythm.   Pulmonary:      Effort: Pulmonary effort is normal. No respiratory distress.      Breath sounds: Examination of the right-lower field reveals rhonchi. Examination of the left-lower field reveals rhonchi. Wheezing and rhonchi present.   Abdominal:      Palpations: Abdomen is soft.   Musculoskeletal:         General: Swelling (BLE Chronic Venous Stasis with Wound; Weeping) present.      Right lower leg: Edema present.      Left lower leg: Edema present.   Skin:     General: Skin is warm.   Neurological:      General: No focal deficit present.      Mental Status: She is alert and oriented to person, place, and time.   Psychiatric:         Behavior: Behavior normal.         Judgment: Judgment normal.       Home Medications:   No current facility-administered medications on file prior to " encounter.     Current Outpatient Medications on File Prior to Encounter   Medication Sig Dispense Refill    amLODIPine (NORVASC) 5 MG tablet Take 5 mg by mouth once daily.      apixaban (ELIQUIS) 2.5 mg Tab Take 1 tablet (2.5 mg total) by mouth 2 (two) times daily. 60 tablet 11    carvediloL (COREG) 12.5 MG tablet Take 12.5 mg by mouth 2 (two) times daily.      clopidogreL (PLAVIX) 75 mg tablet Take 75 mg by mouth once daily.      FLUoxetine 20 MG capsule Take 20 mg by mouth once daily.      furosemide (LASIX) 40 MG tablet Take 40 mg by mouth 2 (two) times daily.      nitrofurantoin, macrocrystal-monohydrate, (MACROBID) 100 MG capsule Take 1 capsule (100 mg total) by mouth 2 (two) times daily. 20 capsule 0    pantoprazole (PROTONIX) 40 MG tablet Take 40 mg by mouth once daily at 6am.      valsartan (DIOVAN) 160 MG tablet Take 160 mg by mouth 2 (two) times daily.       Current Schedule Inpatient Medications:   apixaban  2.5 mg Oral BID    carvediloL  12.5 mg Oral BID    clopidogreL  75 mg Oral Daily    FLUoxetine  20 mg Oral Daily    furosemide (LASIX) injection  40 mg Intravenous Q12H    NIFEdipine  60 mg Oral Daily    pantoprazole  40 mg Oral Daily     Continuous Infusions:    Assessment:   Acute on Chronic Diastolic HF/EF 65-70%    - ECHO (8.24.24) _ LVEF 65-70%, Grade II DD    - ECHO (6.14.24) - LVEF 50-55%, Grade I DD  HTN/HHD with Hx of CKD with Hx of Diastolic HF - Uncontrolled HTN  Anasarca  NSTEMI Type II due to HF and SUSU/CKD III-IV  Pulmonary HTN     - ECHO (6.14.23) - PASP about 59mmHg  Chronic Lymphedema  Chronic BLE Venous Statis Dermatitis with Stasis Ulcers - POA  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  MO  Chronic Anemia  Hx of DVT/Eliquis as OP  GERD  Depression   No Hx of GIB     Plan:   Agree with IV Diuresis with Lasix 40 mg BID; monitor ing creatinine   Accurate I&Os and Daily Weights  Consider Nephrology Consult if Renal Indices continue to Climb  Aggressive BP Control  Continue Eliquis (Hx of DVT),  Norvasc, BB and Plavix  ECHO Reviewed   No Entresto given Elevated Renal Indices   Wound care consult for chronic venous stasis wounds  DVT prophylaxis-on Eliquis  Will Continue to Follow  Labs in AM: CBC, CMP and Mg    Yandel Peña MD  Cardiology  [facility]  08/27/2024     I agree with the findings of the complexity of problems addressed and take responsibility for the plan's risks and complications. I approved the plan documented by the resident, .  RHF Severe pul HTN and DD continue iv diuresis

## 2024-08-27 NOTE — PLAN OF CARE
Problem: Physical Therapy  Goal: Physical Therapy Goal  Description: Goals to be met by: 2024     Patient will increase functional independence with mobility by performin. Supine to sit with MInimal Assistance  2. Sit to supine with MInimal Assistance  3. Sit to stand transfer with Minimal Assistance  4. Bed to chair transfer with Minimal Assistance using Rolling Walker  5. Gait  x 50 feet with Minimal Assistance using Rolling Walker.     Outcome: Progressing      MRN# [id number] [facility]

## 2024-08-27 NOTE — PLAN OF CARE
Problem: Occupational Therapy  Goal: Occupational Therapy Goal  Description: Goals to be met by 9/27/2024    Pt will complete grooming standing at sink with LRAD with SBA.  Pt will complete UB dressing with SBA.  Pt will complete LB dressing with SBA using LRAD.  Pt will complete toileting with SBA using LRAD.  Pt will complete functional mobility to/from toilet and toilet transfer with SBA using LRAD.    Outcome: Progressing

## 2024-08-27 NOTE — PT/OT/SLP EVAL
Occupational Therapy  Evaluation    Name: Jil Graves  MRN: [id number]    Recommendations:     Discharge therapy intensity: Moderate Intensity Therapy   Discharge Equipment Recommendations:  to be determined by next level of care  Barriers to discharge:   (ongoing medical needs; severity of deficits)    Assessment:     Jil Graves is a 84 y.o. female with a medical diagnosis of acute decompensated heart failure, acute on chronic kidney disease, NSTEMI, bilateral lower extremity wounds/chronic venous stasis wounds, chronic lymphedema, and morbid obesity.  She presents with the following performance deficits affecting function: impaired endurance, weakness, impaired self care skills, impaired functional mobility, impaired balance. Patient would benefit from moderate intensity therapy upon discharge due to severity of deficits.    Rehab Prognosis: Good; patient would benefit from acute skilled OT services to address these deficits and reach maximum level of function.       Plan:     Patient to be seen 4 x/week to address the above listed problems via self-care/home management, therapeutic activities, therapeutic exercises  Plan of Care Expires: 09/10/24  Plan of Care Reviewed with: patient    Subjective     Chief Complaint: none  Patient/Family Comments/goals: increase indep with ADL tasks    Occupational Profile:  Living Environment: lives with daughter with ramp to enter; sponge bathes  Previous level of function: indep with ADLs and dependent on IADLs  Roles and Routines: none stated  Equipment Used at Home: wheelchair, walker, rolling, cane, straight, rollator  Assistance upon Discharge: daughter 24/7    Pain/Comfort:  Pain Rating 1: 0/10    Patients cultural, spiritual, Christianity conflicts given the current situation: no    Objective:     OT communicated with nurse and PT prior to session.      Patient was found HOB elevated with peripheral IV, telemetry, PureWick upon OT entry to room.    General  Precautions: Standard, fall  Orthopedic Precautions: N/A  Braces: N/A    Vital Signs: Respiratory Status: on room air    Bed Mobility:    Patient completed Supine to Sit with maximal assistance and 2 persons  Patient completed Sit to Supine with maximal assistance and 2 persons    Functional Mobility/Transfers:  Patient completed Sit <> Stand Transfer with maximal assistance and of 2 persons  with  rolling walker   Functional Mobility: able to clear buttocks once for a few seconds; unable to take steps    Activities of Daily Living:  Lower Body Dressing: total assistance socks  Toileting: total assistance purewick in place    Functional Cognition:  Intact    Visual Perceptual Skills:  Intact    Upper Extremity Function:  Right and Left Upper Extremity:   Range of Motion: unable to reach full shld flexion but is functional  Strength: 3-/5    Balance:   Static sitting balance: WFL    Therapeutic Positioning  Risk for acquired pressure injuries is significantly increased due to impaired mobility and altered skin already present.    OT interventions performed during the course of today's session:   Education was provided on benefits of and recommendations for therapeutic positioning  Therapeutic positioning was provided at the conclusion of session to offload all bony prominences for the prevention and/or reduction of pressure injuries  Positioning recommendations were communicated to care team     Skin assessment: all bony prominences were assessed    Findings: known area of altered skin integrity at BLE    OT recommendations for therapeutic positioning throughout hospitalization:   Follow [facility] Pressure Injury Prevention Protocol  Geomat recommended for sacral protection while [facility]  Specialty Mattress    Patient Education:  Patient provided with verbal education education regarding OT role/goals/POC, fall prevention, safety awareness, Discharge/DME recommendations, and pressure ulcer prevention.  Understanding was  verbalized.     Patient left right sidelying with all lines intact, call button in reach, wedge under L side, pressure relief boots, and nurse notified.    GOALS:   Multidisciplinary Problems       Occupational Therapy Goals          Problem: Occupational Therapy    Goal Priority Disciplines Outcome Interventions   Occupational Therapy Goal     OT, PT/OT Progressing    Description: Goals to be met by 9/27/2024    Pt will complete grooming standing at sink with LRAD with SBA.  Pt will complete UB dressing with SBA.  Pt will complete LB dressing with SBA using LRAD.  Pt will complete toileting with SBA using LRAD.  Pt will complete functional mobility to/from toilet and toilet transfer with SBA using LRAD.                         History:     Past Medical History:   Diagnosis Date    Chronic kidney disease, unspecified     Chronic venous stasis     Depression     DVT (deep vein thrombosis) in pregnancy     Hypertension     Lymphedema     Obesity     Peripheral vascular disease, unspecified          Past Surgical History:   Procedure Laterality Date    BILATERAL TUBAL LIGATION Bilateral     CHOLECYSTECTOMY      INSERTION OF BARE METAL STENT INTO PERIPHERAL ARTERY Bilateral     Lower extremities       Time Tracking:     OT Date of Treatment:    OT Start Time: 1310  OT Stop Time: 1343  OT Total Time (min): 33 min    Billable Minutes:Evaluation mod    8/27/2024

## 2024-08-27 NOTE — PT/OT/SLP EVAL
Physical Therapy Evaluation    Patient Name:  Jil Graves   MRN:  [id number]    Recommendations:     Discharge therapy intensity: Moderate Intensity Therapy   Discharge Equipment Recommendations: to be determined by next level of care   Barriers to discharge: Impaired mobility and Ongoing medical needs    Assessment:     Jil Graves is a 84 y.o. female admitted with a medical diagnosis of acute decompensated heart failure, acute on chronic kidney disease, NSTEMI, bilateral lower extremity wounds/chronic venous stasis wounds, chronic lymphedema, and morbid obesity.  She presents with the following impairments/functional limitations: weakness, gait instability, impaired endurance, impaired balance, impaired functional mobility, decreased lower extremity function, impaired self care skills.    Pt with Fair tolerance to PT evaluation. Pt reports that she lives with daughter prior to admit. Pt's daughter is her primary caregiver. Daughter does not work. Daughter assists patient with household chores and ADLs. Pt reports that she uses a rollator and walker Independently at home for short distances and she uses wheelchair for mobility for appointments. Today, pt required Max A x2 for bed mobility and transfers secondary to weakness and pain. Recommending moderate intensity therapy upon discharge in order for patient to return to prior level of function.     Rehab Prognosis: Good; patient would benefit from acute skilled PT services to address these deficits and reach maximum level of function.    Recent Surgery: * No surgery found *      Plan:     During this hospitalization, patient would benefit from acute PT services 5 x/week to address the identified rehab impairments via gait training, therapeutic activities, therapeutic exercises, neuromuscular re-education and progress toward the following goals:    Plan of Care Expires:  09/27/24    Subjective     Chief Complaint: B leg pain   Patient/Family Comments/goals:  PLOF  Pain/Comfort:       Patients cultural, spiritual, Mandaeism conflicts given the current situation: no    Living Environment:  Pt lives with daughter (who is her primary caregiver). Ramp to enter.   Prior to admission, patients level of function was Independent.  Equipment used at home: walker, rolling, wheelchair.  DME owned (not currently used): none.  Upon discharge, patient will have assistance from daughter.    Objective:     Communicated with RN prior to session.  Patient found HOB elevated with PureWick, pulse ox (continuous), telemetry, peripheral IV  upon PT entry to room.    General Precautions: Standard, fall  Orthopedic Precautions:N/A   Braces: N/A  Respiratory Status: Room air  Blood Pressure: NT      Exams:  RLE ROM: WFL  RLE Strength: 3+/5 grossly  LLE ROM: WFL  LLE Strength: 3+/5 grossly   Skin integrity: Visible skin intact      Functional Mobility:  Bed Mobility:     Supine to Sit: maximal assistance and of 2 persons  Sit to Supine: maximal assistance and of 2 persons  Transfers:     Sit to Stand:  maximal assistance and of 2 persons with rolling walker  2 reps completed; unable to complete full upright standing secondary to weakness and pain.       AM-PAC 6 CLICK MOBILITY  Total Score:        Treatment & Education:      Patient provided with verbal education education regarding PT role/goals/POC, fall prevention, safety awareness, and discharge/DME recommendations.  Understanding was verbalized.     Patient left HOB elevated with all lines intact, call button in reach, and pressure relief boots.    GOALS:   Multidisciplinary Problems       Physical Therapy Goals          Problem: Physical Therapy    Goal Priority Disciplines Outcome Goal Variances Interventions   Physical Therapy Goal     PT, PT/OT Progressing     Description: Goals to be met by: 2024     Patient will increase functional independence with mobility by performin. Supine to sit with MInimal Assistance  2. Sit to  supine with MInimal Assistance  3. Sit to stand transfer with Minimal Assistance  4. Bed to chair transfer with Minimal Assistance using Rolling Walker  5. Gait  x 50 feet with Minimal Assistance using Rolling Walker.                          History:     Past Medical History:   Diagnosis Date    Chronic kidney disease, unspecified     Chronic venous stasis     Depression     DVT (deep vein thrombosis) in pregnancy     Hypertension     Lymphedema     Obesity     Peripheral vascular disease, unspecified        Past Surgical History:   Procedure Laterality Date    BILATERAL TUBAL LIGATION Bilateral     CHOLECYSTECTOMY      INSERTION OF BARE METAL STENT INTO PERIPHERAL ARTERY Bilateral     Lower extremities       Time Tracking:     PT Received On: 08/27/24  PT Start Time: 1310     PT Stop Time: 1343  PT Total Time (min): 33 min     Billable Minutes: Evaluation Mod      08/27/2024

## 2024-08-28 VITALS
HEART RATE: 60 BPM | HEIGHT: 70 IN | RESPIRATION RATE: 18 BRPM | TEMPERATURE: 99 F | SYSTOLIC BLOOD PRESSURE: 152 MMHG | OXYGEN SATURATION: 96 % | DIASTOLIC BLOOD PRESSURE: 70 MMHG | WEIGHT: 293 LBS | BODY MASS INDEX: 41.95 KG/M2

## 2024-08-28 PROBLEM — I50.9 CHF (CONGESTIVE HEART FAILURE): Status: ACTIVE | Noted: 2024-08-28

## 2024-08-28 LAB
ANION GAP SERPL CALC-SCNC: 10 MEQ/L
BUN SERPL-MCNC: 29.7 MG/DL (ref 9.8–20.1)
CALCIUM SERPL-MCNC: 8.5 MG/DL (ref 8.4–10.2)
CHLORIDE SERPL-SCNC: 110 MMOL/L (ref 98–107)
CO2 SERPL-SCNC: 22 MMOL/L (ref 23–31)
CREAT SERPL-MCNC: 1.92 MG/DL (ref 0.55–1.02)
CREAT/UREA NIT SERPL: 15
GFR SERPLBLD CREATININE-BSD FMLA CKD-EPI: 25 ML/MIN/1.73/M2
GLUCOSE SERPL-MCNC: 136 MG/DL (ref 82–115)
MAGNESIUM SERPL-MCNC: 1.9 MG/DL (ref 1.6–2.6)
POTASSIUM SERPL-SCNC: 4.3 MMOL/L (ref 3.5–5.1)
SODIUM SERPL-SCNC: 142 MMOL/L (ref 136–145)

## 2024-08-28 PROCEDURE — 36415 COLL VENOUS BLD VENIPUNCTURE: CPT | Performed by: NURSE PRACTITIONER

## 2024-08-28 PROCEDURE — 97530 THERAPEUTIC ACTIVITIES: CPT | Mod: CQ

## 2024-08-28 PROCEDURE — 25000003 PHARM REV CODE 250: Performed by: INTERNAL MEDICINE

## 2024-08-28 PROCEDURE — 83735 ASSAY OF MAGNESIUM: CPT | Performed by: NURSE PRACTITIONER

## 2024-08-28 PROCEDURE — 63600175 PHARM REV CODE 636 W HCPCS: Performed by: NURSE PRACTITIONER

## 2024-08-28 PROCEDURE — 80048 BASIC METABOLIC PNL TOTAL CA: CPT | Performed by: NURSE PRACTITIONER

## 2024-08-28 PROCEDURE — 97116 GAIT TRAINING THERAPY: CPT | Mod: CQ

## 2024-08-28 PROCEDURE — 97530 THERAPEUTIC ACTIVITIES: CPT | Mod: CO

## 2024-08-28 RX ADMIN — CARVEDILOL 12.5 MG: 12.5 TABLET, FILM COATED ORAL at 10:08

## 2024-08-28 RX ADMIN — NIFEDIPINE 60 MG: 60 TABLET, FILM COATED, EXTENDED RELEASE ORAL at 10:08

## 2024-08-28 RX ADMIN — FUROSEMIDE 40 MG: 10 INJECTION, SOLUTION INTRAMUSCULAR; INTRAVENOUS at 10:08

## 2024-08-28 RX ADMIN — PANTOPRAZOLE SODIUM 40 MG: 40 TABLET, DELAYED RELEASE ORAL at 10:08

## 2024-08-28 RX ADMIN — CLOPIDOGREL BISULFATE 75 MG: 75 TABLET ORAL at 10:08

## 2024-08-28 RX ADMIN — FLUOXETINE HYDROCHLORIDE 20 MG: 20 CAPSULE ORAL at 10:08

## 2024-08-28 RX ADMIN — APIXABAN 2.5 MG: 2.5 TABLET, FILM COATED ORAL at 10:08

## 2024-08-28 NOTE — PLAN OF CARE
"[facility] SNF list provided - pt refused , States " she is not going". She reports that her daughter is there at home and is paid to take care of her . I advised to call me if she changes her mind about going to SNF  "

## 2024-08-28 NOTE — PT/OT/SLP PROGRESS
Physical Therapy Treatment    Patient Name:  Jil Graves   MRN:  [id number]    Recommendations:     Discharge therapy intensity: Moderate Intensity Therapy   Discharge Equipment Recommendations: to be determined by next level of care  Barriers to discharge: None    Assessment:     Jil Graves is a 84 y.o. female.  She presents with the following impairments/functional limitations: weakness, gait instability, impaired endurance, impaired balance, impaired functional mobility, decreased lower extremity function, impaired self care skills.    Rehab Prognosis: Good; patient would benefit from acute skilled PT services to address these deficits and reach maximum level of function.    Recent Surgery: * No surgery found *      Plan:     During this hospitalization, patient would benefit from acute PT services 5 x/week to address the identified rehab impairments via gait training, therapeutic activities, therapeutic exercises, neuromuscular re-education and progress toward the following goals:    Plan of Care Expires:  09/27/24    Subjective     Chief Complaint: none    Objective:     Communicated with nurse prior to session.  Patient found supine with PureWick, pulse ox (continuous), telemetry, peripheral IV upon PT entry to room.     General Precautions: Standard, fall  Orthopedic Precautions: N/A  Braces: N/A  Respiratory Status: Room air  Blood Pressure:   Skin Integrity:  LE dressings      Functional Mobility:  SBA to get EOB and mod assist STS  Gait 22 ft RW CGA.   Pt want to go home and she stated that daughter is paid to take care of her. Based on level of mobility her daughter should be able to handle her so home with HHPT/OT should be fine.     Education Provided:  Role and goals of PT, transfer training, bed mobility, gait training, balance training, safety awareness, assistive device, strengthening exercises, and importance of participating in PT to return to PLOF.    Patient left up in chair with call  button in reach    GOALS:   Multidisciplinary Problems       Physical Therapy Goals          Problem: Physical Therapy    Goal Priority Disciplines Outcome Goal Variances Interventions   Physical Therapy Goal     PT, PT/OT Progressing     Description: Goals to be met by: 2024     Patient will increase functional independence with mobility by performin. Supine to sit with MInimal Assistance  2. Sit to supine with MInimal Assistance  3. Sit to stand transfer with Minimal Assistance  4. Bed to chair transfer with Minimal Assistance using Rolling Walker  5. Gait  x 50 feet with Minimal Assistance using Rolling Walker.                          Time Tracking:     Billable Minutes: Gait Training 15 and Therapeutic Activity 10    Treatment Type: Treatment  PT/PTA: PTA     Number of PTA visits since last PT visit: 2024

## 2024-08-28 NOTE — DISCHARGE SUMMARY
[facility] HOSPITAL MEDICINE - DISCHARGE SUMMARY    Patient Name: Jil Graves  MRN: [id number]  Admission Date: 8/23/2024  Discharge Date: 08/28/2024  Hospital Length of Stay: 5 days  Discharge Provider: Mike Hopkins MD  Primary Care Provider: Antwon Melendez MD      HOSPITAL COURSE   84-year-old female with significant history of morbid obesity, chronic diastolic heart failure, chronic kidney disease, chronic bilateral lower extremity lymphedema/venous stasis, chronic venous stasis wounds, status post venous stents, prior DVT on low-dose Eliquis, depression, PVD presented to the ED with complaints of progressively worsening bilateral lower extremity swelling, abdominal distention and bloating with associated dyspnea.  Hypertensive in the ED, chest x-ray consistent with pulmonary edema.  Lab significant for anemia, renal insufficiency with creatinine above baseline, hyperkalemia, elevated troponins with flat trend.  Patient was admitted hospital medicine services for acute decompensated diastolic heart failure/anasarca and NSTEMI type 2, cardiology services consulted.  Initiated IV diuretics.  Echocardiogram ordered.  Echo with intact ejection fraction, grade 2 diastolic dysfunction.  Cardiology recommended to continue IV diuresis, home medications continued as appropriate.   At this time she has diuresed well, feeling better, no longer short of breath.  [facility] has signed off.  We will plna to discharge her home today as she does not want snf placement.    She still does have edema to the legs but likley some component of lymphedema vs venous insufficiency.         PHYSICAL EXAM     Most Recent Vital Signs:  Temp: 98.5 °F (36.9 °C) (08/28/24 1143)  Pulse: 60 (08/28/24 1143)  Resp: 18 (08/28/24 1143)  BP: (!) 152/70 (08/28/24 1143)  SpO2: 96 % (08/28/24 1143)   GENERAL: In no acute distress, afebrile  HEENT:  CHEST: Clear to auscultation bilaterally  HEART: S1, S2, no  appreciable murmur  ABDOMEN: Soft, nontender, BS +  MSK: Warm, 2+ edema lower extremity edema, no clubbing or cyanosis  NEUROLOGIC: Alert and oriented x4, moving all extremities with good strength   INTEGUMENTARY:  PSYCHIATRY:          DISCHARGE DIAGNOSIS   Acute decompensated diastolic heart failure   Volume overload/anasarca   Acute on chronic kidney disease-cardiorenal  Anemia of chronic disease   NSTEMI-likely type 2 secondary to decompensated heart failure  Poorly-controlled HTN-improved  Bilateral lower extremity wounds/chronic venous stasis wounds   Chronic lymphedema   Morbid obesity   History of DVT on Eliquis   History of depression   History of PVD        _____________________________________________________________________________      DISCHARGE MED REC     Current Discharge Medication List        CONTINUE these medications which have NOT CHANGED    Details   amLODIPine (NORVASC) 5 MG tablet Take 5 mg by mouth once daily.      apixaban (ELIQUIS) 2.5 mg Tab Take 1 tablet (2.5 mg total) by mouth 2 (two) times daily.  Qty: 60 tablet, Refills: 11      carvediloL (COREG) 12.5 MG tablet Take 12.5 mg by mouth 2 (two) times daily.      clopidogreL (PLAVIX) 75 mg tablet Take 75 mg by mouth once daily.      FLUoxetine 20 MG capsule Take 20 mg by mouth once daily.      furosemide (LASIX) 40 MG tablet Take 40 mg by mouth 2 (two) times daily.      pantoprazole (PROTONIX) 40 MG tablet Take 40 mg by mouth once daily at 6am.      valsartan (DIOVAN) 160 MG tablet Take 160 mg by mouth 2 (two) times daily.           STOP taking these medications       nitrofurantoin, macrocrystal-monohydrate, (MACROBID) 100 MG capsule Comments:   Reason for Stopping:                  CONSULTS     Consults (From admission, onward)          Status Ordering Provider     Inpatient consult to Cardiology  Once        Provider:  Manuel Calle MD    Completed CURET IV, MELVIN B              FOLLOW UP      Follow-up Information       Al  Antwon SHELTON MD Follow up in 1 week(s).    Specialty: Family Medicine  Contact information:  [address]  [address]  [place] [address]  [phone]               Wiley Coburn MD Follow up in 1 week(s).    Specialty: Cardiology  Contact information:  [address]  [address]  [place] [address]  [phone]                                 DISCHARGE INSTRUCTIONS     Explained in detail to the patient about the discharge plan, medications, and follow-up visits. Pt understands and agrees with the treatment plan.  Discharged Condition: stable  Diet as tolerated  Activities as tolerated  Discharge to: home with     TIME SPENT ON DISCHARGE   35 minutes        Mike Hopkins MD  Internal Medicine  Department of Hospital Medicine [facility]      This document was created using electronic dictation services.  Please excuse any errors that may have been made.  Contact me if any questions regarding documentation to clarify verbiage.

## 2024-08-28 NOTE — PROGRESS NOTES
Pt discharged with home health. Discharge education given. Medications and follow-ups discussed. IV and tele discontinued. Pt wheeled down via wheelchair and picked up at front entrance by family.

## 2024-08-28 NOTE — PROGRESS NOTES
[facility]  Hospital Medicine Progress Note        Chief Complaint: Inpatient Follow-up    HPI:   84-year-old female with significant history of morbid obesity, chronic diastolic heart failure, chronic kidney disease, chronic bilateral lower extremity lymphedema/venous stasis, chronic venous stasis wounds, status post venous stents, prior DVT on low-dose Eliquis, depression, PVD presented to the ED with complaints of progressively worsening bilateral lower extremity swelling, abdominal distention and bloating with associated dyspnea.  Hypertensive in the ED, chest x-ray consistent with pulmonary edema.  Lab significant for anemia, renal insufficiency with creatinine above baseline, hyperkalemia, elevated troponins with flat trend.  Patient was admitted hospital medicine services for acute decompensated diastolic heart failure/anasarca and NSTEMI type 2, cardiology services consulted.  Initiated IV diuretics.  Echocardiogram ordered.  Echo with intact ejection fraction, grade 2 diastolic dysfunction.  Cardiology recommended to continue IV diuresis, home medications continued as appropriate.  Interval Hx:     Feeling better, pending cis plans, still has edema to the legs        Objective/physical exam:  General: In no acute distress, afebrile  Chest: Clear to auscultation bilaterally  Heart: S1, S2, no appreciable murmur  Abdomen: Soft, nontender, BS +  MSK:  Bilateral lower extremity edema, chronic venous stasis   Neurologic: Alert and oriented x4,   VITAL SIGNS: 24 HRS MIN & MAX LAST   Temp  Min: 98.5 °F (36.9 °C)  Max: 99.9 °F (37.7 °C) 98.5 °F (36.9 °C)   BP  Min: 120/65  Max: 152/70 (!) 152/70   Pulse  Min: 56  Max: 67  60   Resp  Min: 18  Max: 20 18   SpO2  Min: 92 %  Max: 97 % 96 %       Recent Labs   Lab 08/27/24 0344   WBC 7.54   RBC 3.44*   HGB 8.4*   HCT 26.2*   MCV 76.2*   MCH 24.4*   MCHC 32.1*   RDW 17.1*      MPV 10.5*         Recent Labs   Lab 08/27/24  3344  08/28/24  1121    142   K 4.6 4.3   * 110*   CO2 23 22*   BUN 27.9* 29.7*   CREATININE 1.90* 1.92*   CALCIUM 8.0* 8.5   MG  --  1.90   ALBUMIN 2.5*  --    ALKPHOS 88  --    ALT 9  --    AST 12  --    BILITOT 0.2  --           Microbiology Results (last 7 days)       ** No results found for the last 168 hours. **             Scheduled Med:   apixaban  2.5 mg Oral BID    carvediloL  12.5 mg Oral BID    clopidogreL  75 mg Oral Daily    FLUoxetine  20 mg Oral Daily    furosemide (LASIX) injection  40 mg Intravenous Q12H    NIFEdipine  60 mg Oral Daily    pantoprazole  40 mg Oral Daily          Assessment/Plan:    Acute decompensated diastolic heart failure   Volume overload/anasarca   Acute on chronic kidney disease-cardiorenal  Anemia of chronic disease   NSTEMI-likely type 2 secondary to decompensated heart failure  Poorly-controlled HTN-improved  Bilateral lower extremity wounds/chronic venous stasis wounds   Chronic lymphedema   Morbid obesity   History of DVT on Eliquis   History of depression   History of PVD   Prophylaxis    Will continue IV diuretics with Lasix 40 mg b.i.d.  Lower extremity swelling still persist, renal function somewhat stabilizing on diuresis   In case of worsening renal function consider Nephrology evaluation   Cardiology on board and is agreeing with IV diuresis   Echo revealed in detail EF, has diastolic dysfunction  Continue Eliquis, Coreg, Plavix, nifedipine  BP improved   Continue other home meds-Prozac, ppi   Wound care consult for chronic venous stasis wounds  DVT prophylaxis-on Eliquis      Mike Hopkins MD

## 2024-08-28 NOTE — PROGRESS NOTES
" [facility]    Cardiology  Progress Note    Patient Name: Jil Graves  MRN: [id number]  Admission Date: 8/23/2024  Hospital Length of Stay: 5 days  Code Status: Full Code   Attending Provider: Trenton Leonard MD   Consulting Provider: LUZ MARINA Limon  Primary Care Physician: Antwon Melendez MD  Principal Problem:<principal problem not specified>    Patient information was obtained from patient, past medical records, and ER records.     Subjective:   Chief Complaint/Reason for Consult: HF    HPI: Ms. Graves is an 83 y/o female who is known to [facility], Dr. Coburn. The patient has chronic BLE Venous Stasis with Non-Healing wounds. About 1 week prior to presentation she developed BLE worsening of swelling to BLE and Abd Distension. She developed SOB and presented to the ER. On arrival to the ER she was found to Pulmonary Vascular Congestion on EKG, BUN/Crea 21.1/1.75, H&H 8.8/27.4, K 5.5, .2, and a Troponin of 0.054. The patient was admitted to Hospital Medicine and [facility] was consulted for HF Management.     Hospital Course:  8.25.24: NAD. Fluid Balance Net Negative 1140mL. "I am ok." Denies CP, SOB and Palps.   8.28.24: NAD Noted. Vitals Stable. SR on Tele. On RA. Sitting in chair.     PMH: CKD, Chronic Venous Stasis, Depression, DVT, HTN, Chronic Lymphedema, OA, PAD  PSH: BTL, Cholecystectomy, Angiogram  Family History: Mother, D, Melanoma; Father, D, Unknown; Sister, D, CAD, HTN, HLD  Social History: Denies Illicit Drug, ETOH and Tobacco Use     Previous Cardiac Diagnostics:   ECHO 8.24.24:  Left Ventricle: The left ventricle is normal in size. Increased wall thickness. There is normal systolic function with a visually estimated ejection fraction of 65 - 70%. Grade 2 DD.  Right Ventricle: Normal right ventricular cavity size. Systolic function is normal. TAPSE is 2.08 cm.  Left Atrium: Left atrium is dilated.  Aortic Valve: The aortic valve is a trileaflet valve.  Mitral Valve: There is mild " regurgitation.  Tricuspid Valve: There is moderate regurgitation.  Pulmonary Artery: The estimated pulmonary artery systolic pressure is 53 mmHg.  IVC/SVC: Elevated venous pressure at 15 mmHg.    ECHO 6.14.23:  The study quality is average.   The left ventricle is normal in size. Global left ventricular systolic function is normal. The left ventricular ejection fraction is 55%. The left ventricle diastolic function is impaired (Grade I) with normal left atrial pressure. Mild to moderate concentric left ventricular hypertrophy is present.  The left atrium is mildly enlarged based on BATSHEVA ~38.4ml/m².  Mild calcification of the aortic valve is noted with adequate cuspal excursion.  Moderate (2+) tricuspid and mild to (1+) mitral and pulmonic regurgitation.   PASP ~59 mmHg.  PHTN is noted.    ECHO 12.28.22:  RVSP ~ 90 mmHg.   Left Ventricle: The left ventricular cavity size is normal. Hypertrophy   observed. The left ventricular wall motion is normal. Normal (55-65%)   ejection fraction. Left ventricular diastolic dysfunction.   Right Ventricle: The right ventricular cavity size is mildly dilated.   Left Atrium: Left atrium is mildly dilated. Right Atrium: Cavity is mildly   dilated. IVC/SVC: Elevated central venous pressure (10-20 mm Hg).   Mitral Valve: Mild to moderate mitral regurgitation. No stenosis.   Normal mitral valve structure. There is mild mitral annular calcification.   Tricuspid Valve: Valve structure is normal. No stenosis. Pulmonary   hypertension present. Moderate tricuspid regurgitation.   Pericardium: No pericardial effusion.     Review of patient's allergies indicates:  No Known Allergies  No current facility-administered medications on file prior to encounter.     Current Outpatient Medications on File Prior to Encounter   Medication Sig    amLODIPine (NORVASC) 5 MG tablet Take 5 mg by mouth once daily.    apixaban (ELIQUIS) 2.5 mg Tab Take 1 tablet (2.5 mg total) by mouth 2 (two) times daily.     carvediloL (COREG) 12.5 MG tablet Take 12.5 mg by mouth 2 (two) times daily.    clopidogreL (PLAVIX) 75 mg tablet Take 75 mg by mouth once daily.    FLUoxetine 20 MG capsule Take 20 mg by mouth once daily.    furosemide (LASIX) 40 MG tablet Take 40 mg by mouth 2 (two) times daily.    nitrofurantoin, macrocrystal-monohydrate, (MACROBID) 100 MG capsule Take 1 capsule (100 mg total) by mouth 2 (two) times daily.    pantoprazole (PROTONIX) 40 MG tablet Take 40 mg by mouth once daily at 6am.    valsartan (DIOVAN) 160 MG tablet Take 160 mg by mouth 2 (two) times daily.     Review of Systems   Respiratory:  Negative for chest tightness and shortness of breath.    Cardiovascular:  Positive for leg swelling. Negative for chest pain.   All other systems reviewed and are negative.    Objective:     Vital Signs (Most Recent):  Temp: 99.4 °F (37.4 °C) (08/28/24 0805)  Pulse: 67 (08/28/24 0805)  Resp: 18 (08/28/24 0805)  BP: (!) 131/57 (08/28/24 1028)  SpO2: (!) 92 % (08/28/24 0805) Vital Signs (24h Range):  Temp:  [98.2 °F (36.8 °C)-99.9 °F (37.7 °C)] 99.4 °F (37.4 °C)  Pulse:  [56-67] 67  Resp:  [18-20] 18  SpO2:  [92 %-97 %] 92 %  BP: (115-131)/(47-65) 131/57   Weight: (!) 143.4 kg (316 lb 2.2 oz)  Body mass index is 45.36 kg/m².  SpO2: (!) 92 %       Intake/Output Summary (Last 24 hours) at 8/28/2024 1057  Last data filed at 8/28/2024 0121  Gross per 24 hour   Intake 940 ml   Output 1400 ml   Net -460 ml     Lines/Drains/Airways       Peripheral Intravenous Line  Duration                  Peripheral IV - Single Lumen 08/25/24 1740 20 G Anterior;Proximal;Right Forearm 2 days                  Significant Labs:   Chemistries:   Recent Labs   Lab 08/23/24  1432 08/23/24  1935 08/24/24  0156 08/24/24  0814 08/25/24  0450 08/26/24  0704 08/27/24  0344     --  142  --  141 140 141   K 5.5*  --  5.3*  --  5.5* 4.4 4.6   *  --  114*  --  113* 110* 110*   CO2 19*  --  18*  --  20* 23 23   BUN 21.9*  --  21.1*  --  25.8*  "26.7* 27.9*   CREATININE 1.85*  --  1.75*  --  2.00* 1.92* 1.90*   CALCIUM 9.0  --  8.6  --  8.2* 8.3* 8.0*   BILITOT 0.3  --  0.4  --  0.3 0.3 0.2   ALKPHOS 104  --  103  --  92 91 88   ALT 8  --  7  --  8 7 9   AST 13  --  15  --  12 12 12   GLUCOSE 100  --  102  --  95 93 90   MG  --   --   --   --  2.10 1.90  --    TROPONINI 0.054* 0.059* 0.068* 0.035  --   --   --         CBC/Anemia Labs: Coags:    Recent Labs   Lab 08/25/24  0450 08/26/24  0704 08/27/24  0344   WBC 7.24 7.51 7.54   HGB 8.3* 8.5* 8.4*   HCT 26.6* 26.4* 26.2*    214 197   MCV 76.9* 76.3* 76.2*   RDW 17.4* 17.2* 17.1*   IRON  --  37*  --    FERRITIN  --  377.27*  --    FOLATE  --  15.0  --    [id number]  --  446  --     No results for input(s): "PT", "INR", "APTT" in the last 168 hours.     Telemetry: SR    Physical Exam  Vitals and nursing note reviewed.   Constitutional:       General: She is not in acute distress.     Appearance: Normal appearance. She is obese.   HENT:      Head: Normocephalic.      Mouth/Throat:      Mouth: Mucous membranes are moist.   Cardiovascular:      Rate and Rhythm: Normal rate and regular rhythm.   Pulmonary:      Effort: Pulmonary effort is normal. No respiratory distress.      Comments: On RA  Abdominal:      Palpations: Abdomen is soft.   Musculoskeletal:      Right lower leg: Edema present.      Left lower leg: Edema present.      Comments: BLE Warm/Chronic Venous Insufficiency Skin Changes.    Skin:     General: Skin is warm and dry.   Neurological:      Mental Status: She is alert. Mental status is at baseline.       Home Medications:   No current facility-administered medications on file prior to encounter.     Current Outpatient Medications on File Prior to Encounter   Medication Sig Dispense Refill    amLODIPine (NORVASC) 5 MG tablet Take 5 mg by mouth once daily.      apixaban (ELIQUIS) 2.5 mg Tab Take 1 tablet (2.5 mg total) by mouth 2 (two) times daily. 60 tablet 11    carvediloL (COREG) 12.5 MG " tablet Take 12.5 mg by mouth 2 (two) times daily.      clopidogreL (PLAVIX) 75 mg tablet Take 75 mg by mouth once daily.      FLUoxetine 20 MG capsule Take 20 mg by mouth once daily.      furosemide (LASIX) 40 MG tablet Take 40 mg by mouth 2 (two) times daily.      nitrofurantoin, macrocrystal-monohydrate, (MACROBID) 100 MG capsule Take 1 capsule (100 mg total) by mouth 2 (two) times daily. 20 capsule 0    pantoprazole (PROTONIX) 40 MG tablet Take 40 mg by mouth once daily at 6am.      valsartan (DIOVAN) 160 MG tablet Take 160 mg by mouth 2 (two) times daily.       Current Schedule Inpatient Medications:   apixaban  2.5 mg Oral BID    carvediloL  12.5 mg Oral BID    clopidogreL  75 mg Oral Daily    FLUoxetine  20 mg Oral Daily    furosemide (LASIX) injection  40 mg Intravenous Q12H    NIFEdipine  60 mg Oral Daily    pantoprazole  40 mg Oral Daily         Assessment:   Acute on Chronic Diastolic HF/EF 65-70%    - ECHO (8.24.24) _ LVEF 65-70%, Grade II DD    - Anasarca  HTN/HHD with Hx of CKD with Hx of Diastolic HF (BP Stable)  NSTEMI Type II due to HF and SUSU/CKD III-IV  Pulmonary HTN     - ECHO (6.14.23) - PASP about 59mmHg  Chronic Lymphedema  Chronic BLE Venous Statis Dermatitis with Stasis Ulcers - POA  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 I approved the plan documented by Darell Harris NP.  As above  Sign off

## 2024-08-28 NOTE — PT/OT/SLP PROGRESS
Occupational Therapy   Treatment    Name: Jil Graves  MRN: [id number]  Admitting Diagnosis:  CHF (congestive heart failure)       Recommendations:     Recommended therapy intensity at discharge: Moderate Intensity Therapy   Discharge Equipment Recommendations:  to be determined by next level of care  Barriers to discharge:       Assessment:     Jil Graves is a 84 y.o. female with a medical diagnosis of CHF (congestive heart failure).  She presents with good particpation. Performance deficits affecting function are weakness, impaired endurance, impaired self care skills, impaired functional mobility, gait instability, impaired balance, decreased safety awareness, decreased lower extremity function.     Rehab Prognosis:  Good; patient would benefit from acute skilled OT services to address these deficits and reach maximum level of function.       Plan:     Patient to be seen 4 x/week to address the above listed problems via self-care/home management, therapeutic activities, therapeutic exercises  Plan of Care Expires: 09/10/24  Plan of Care Reviewed with: patient    Subjective     Pain/Comfort:  Pain Rating 1: 0/10    Objective:     Communicated with: nurse prior to session.  Patient found  soiled in chair with purewick on  with peripheral IV, PureWick, telemetry upon OT entry to room.    General Precautions: Standard, fall    Orthopedic Precautions:N/A  Braces: N/A  Respiratory Status: Room air     Occupational Performance:     Bed Mobility:    Patient completed Sit to Supine with moderate assistance     Functional Mobility/Transfers:  Patient completed Sit <> Stand Transfer with maximal assistance and of 2 persons  with  rolling walker from bed side chair x 2 trials  Patient completed Bed <> Chair Transfer using stand step transfer with Rw with Min/Mod A x 2 for safety    AMPAC 6 Click ADL: 15    Patient Education:  Patient and daughter/s were provided with verbal education and demonstrations education  regarding fall prevention and safety awareness.  Understanding was verbalized, however additional teaching warranted.      Patient left supine with all lines intact, call button in reach, and nurses present.    GOALS:   Multidisciplinary Problems       Occupational Therapy Goals          Problem: Occupational Therapy    Goal Priority Disciplines Outcome Interventions   Occupational Therapy Goal     OT, PT/OT Progressing    Description: Goals to be met by 9/27/2024    Pt will complete grooming standing at sink with LRAD with SBA.  Pt will complete UB dressing with SBA.  Pt will complete LB dressing with SBA using LRAD.  Pt will complete toileting with SBA using LRAD.  Pt will complete functional mobility to/from toilet and toilet transfer with SBA using LRAD.                         Time Tracking:     OT Date of Treatment: 08/28/24  OT Start Time: 1535  OT Stop Time: 1602  OT Total Time (min): 27 min    Billable Minutes:Therapeutic Activity 27    OT/JT: JT     Number of JT visits since last OT visit: 1    8/28/2024

## 2024-08-28 NOTE — PLAN OF CARE
08/28/24 1347   Final Note   Assessment Type Final Discharge Note   Anticipated Discharge Disposition Home-Health   Post-Acute Status   Post-Acute Authorization Home Health   Home Health Status Set-up Complete/Auth obtained   Discharge Delays None known at this time     Pt will dc to home with [facility]. KAILEY , dc info sent to  . She refused SNF placemt